# Patient Record
Sex: FEMALE | HISPANIC OR LATINO | Employment: FULL TIME | URBAN - METROPOLITAN AREA
[De-identification: names, ages, dates, MRNs, and addresses within clinical notes are randomized per-mention and may not be internally consistent; named-entity substitution may affect disease eponyms.]

---

## 2015-09-17 LAB
HPV ABSTRACT: NORMAL
PAP-ABSTRACT: NORMAL

## 2017-09-13 ENCOUNTER — PRENATAL OFFICE VISIT (OUTPATIENT)
Dept: MIDWIFE SERVICES | Facility: CLINIC | Age: 37
End: 2017-09-13
Payer: COMMERCIAL

## 2017-09-13 ENCOUNTER — RADIANT APPOINTMENT (OUTPATIENT)
Dept: ULTRASOUND IMAGING | Facility: CLINIC | Age: 37
End: 2017-09-13
Attending: ADVANCED PRACTICE MIDWIFE
Payer: COMMERCIAL

## 2017-09-13 ENCOUNTER — PRENATAL OFFICE VISIT (OUTPATIENT)
Dept: NURSING | Facility: CLINIC | Age: 37
End: 2017-09-13
Payer: COMMERCIAL

## 2017-09-13 VITALS
HEART RATE: 74 BPM | TEMPERATURE: 97.9 F | SYSTOLIC BLOOD PRESSURE: 109 MMHG | BODY MASS INDEX: 28.35 KG/M2 | WEIGHT: 131.4 LBS | HEIGHT: 57 IN | DIASTOLIC BLOOD PRESSURE: 77 MMHG

## 2017-09-13 DIAGNOSIS — Z34.90 SUPERVISION OF NORMAL PREGNANCY: ICD-10-CM

## 2017-09-13 DIAGNOSIS — Z34.90 SUPERVISION OF NORMAL PREGNANCY: Primary | ICD-10-CM

## 2017-09-13 DIAGNOSIS — O02.1 MISSED ABORTION: Primary | ICD-10-CM

## 2017-09-13 PROBLEM — Z23 NEED FOR TDAP VACCINATION: Status: ACTIVE | Noted: 2017-09-13

## 2017-09-13 LAB
ABO + RH BLD: NORMAL
ABO + RH BLD: NORMAL
ALBUMIN UR-MCNC: NEGATIVE MG/DL
APPEARANCE UR: CLEAR
BETA HCG QUAL IFA URINE: POSITIVE
BILIRUB UR QL STRIP: NEGATIVE
BLD GP AB SCN SERPL QL: NORMAL
BLOOD BANK CMNT PATIENT-IMP: NORMAL
COLOR UR AUTO: YELLOW
ERYTHROCYTE [DISTWIDTH] IN BLOOD BY AUTOMATED COUNT: 13.3 % (ref 10–15)
GLUCOSE UR STRIP-MCNC: NEGATIVE MG/DL
HCT VFR BLD AUTO: 37.7 % (ref 35–47)
HGB BLD-MCNC: 12.9 G/DL (ref 11.7–15.7)
HGB UR QL STRIP: NEGATIVE
KETONES UR STRIP-MCNC: NEGATIVE MG/DL
LEUKOCYTE ESTERASE UR QL STRIP: NEGATIVE
MCH RBC QN AUTO: 30.6 PG (ref 26.5–33)
MCHC RBC AUTO-ENTMCNC: 34.2 G/DL (ref 31.5–36.5)
MCV RBC AUTO: 89 FL (ref 78–100)
NITRATE UR QL: NEGATIVE
PH UR STRIP: 7 PH (ref 5–7)
PLATELET # BLD AUTO: 222 10E9/L (ref 150–450)
RBC # BLD AUTO: 4.22 10E12/L (ref 3.8–5.2)
SOURCE: NORMAL
SP GR UR STRIP: 1.01 (ref 1–1.03)
SPECIMEN EXP DATE BLD: NORMAL
UROBILINOGEN UR STRIP-ACNC: 0.2 EU/DL (ref 0.2–1)
WBC # BLD AUTO: 4.1 10E9/L (ref 4–11)

## 2017-09-13 PROCEDURE — 36415 COLL VENOUS BLD VENIPUNCTURE: CPT | Performed by: ADVANCED PRACTICE MIDWIFE

## 2017-09-13 PROCEDURE — 99207 ZZC NO CHARGE NURSE ONLY: CPT

## 2017-09-13 PROCEDURE — 76817 TRANSVAGINAL US OBSTETRIC: CPT | Performed by: OBSTETRICS & GYNECOLOGY

## 2017-09-13 PROCEDURE — 86901 BLOOD TYPING SEROLOGIC RH(D): CPT | Performed by: ADVANCED PRACTICE MIDWIFE

## 2017-09-13 PROCEDURE — 85027 COMPLETE CBC AUTOMATED: CPT | Performed by: ADVANCED PRACTICE MIDWIFE

## 2017-09-13 PROCEDURE — 86900 BLOOD TYPING SEROLOGIC ABO: CPT | Performed by: ADVANCED PRACTICE MIDWIFE

## 2017-09-13 PROCEDURE — 86762 RUBELLA ANTIBODY: CPT | Performed by: ADVANCED PRACTICE MIDWIFE

## 2017-09-13 PROCEDURE — 84703 CHORIONIC GONADOTROPIN ASSAY: CPT | Performed by: ADVANCED PRACTICE MIDWIFE

## 2017-09-13 PROCEDURE — 83021 HEMOGLOBIN CHROMOTOGRAPHY: CPT | Mod: 90 | Performed by: ADVANCED PRACTICE MIDWIFE

## 2017-09-13 PROCEDURE — 99204 OFFICE O/P NEW MOD 45 MIN: CPT | Performed by: ADVANCED PRACTICE MIDWIFE

## 2017-09-13 PROCEDURE — 76815 OB US LIMITED FETUS(S): CPT | Performed by: OBSTETRICS & GYNECOLOGY

## 2017-09-13 PROCEDURE — 87340 HEPATITIS B SURFACE AG IA: CPT | Performed by: ADVANCED PRACTICE MIDWIFE

## 2017-09-13 PROCEDURE — 87086 URINE CULTURE/COLONY COUNT: CPT | Performed by: ADVANCED PRACTICE MIDWIFE

## 2017-09-13 PROCEDURE — 87389 HIV-1 AG W/HIV-1&-2 AB AG IA: CPT | Performed by: ADVANCED PRACTICE MIDWIFE

## 2017-09-13 PROCEDURE — 86780 TREPONEMA PALLIDUM: CPT | Performed by: ADVANCED PRACTICE MIDWIFE

## 2017-09-13 PROCEDURE — 81003 URINALYSIS AUTO W/O SCOPE: CPT | Performed by: ADVANCED PRACTICE MIDWIFE

## 2017-09-13 PROCEDURE — 86850 RBC ANTIBODY SCREEN: CPT | Performed by: ADVANCED PRACTICE MIDWIFE

## 2017-09-13 PROCEDURE — 99000 SPECIMEN HANDLING OFFICE-LAB: CPT | Performed by: ADVANCED PRACTICE MIDWIFE

## 2017-09-13 NOTE — PROGRESS NOTES
"Sailaja had a dating viability US after her nurse intake.  Per US report, \"Early ge IUP with no cardiac activity seen, consistant with a missed AB.  Measures 7w 3d by today's ultrasound.\"  Discussed options.   expectant management, oral medication or D&C.  She was very upset and tearful since she thought that she could be up to 18 weeks pregnant.  She was insistent that she should be prescribed oral medication as soon as possible.  No OB/Gyn MD was available, so I assisted her to make an appt with Dr. Ramon for tomorrow afternoon.        45 minutes was spent face to face with the patient today discussing her history, diagnosis, and follow-up plan as noted above. Over 50% of the visit was spent in counseling and coordination of care.    Total Visit Time: 45 minutes.       "

## 2017-09-13 NOTE — MR AVS SNAPSHOT
After Visit Summary   9/13/2017    Sailaja Aguirre    MRN: [id number]           Patient Information     Date Of Birth          1980        Visit Information        Provider Department      9/13/2017 9:15 AM RD OB NURSE EDUCATION [facility]        Today's Diagnoses     Supervision of normal pregnancy    -  1       Follow-ups after your visit        Your next 10 appointments already scheduled     Sep 19, 2017  3:00 PM CDT   US OB < 14 WEEKS SINGLE with RDUS1   [facility] ([facility])    [address] [place] [id number] [phone]           Please bring a list of your medicines (including vitamins, minerals and over-the-counter drugs). Also, tell your doctor about any allergies you may have. Wear comfortable clothes and leave your valuables at home.  If you re less than 20 weeks drink four 8-ounce glasses of fluid an hour before your exam. If you need to empty your bladder before your exam, try to release only a little urine. Then, drink another glass of fluid.  You may have up to two family members in the exam room. If you bring a small child, an adult must be there to care for him or her.  Please call the Imaging Department at your exam site with any questions.            Sep 19, 2017  3:45 PM CDT   ESTABLISHED PRENATAL with FRANK Birch CNM   [facility] ([facility])    [address] [place] [id number] [phone]              Future tests that were ordered for you today     Open Future Orders        Priority Expected Expires Ordered    US OB < 14 Weeks Single Routine  9/13/2018 9/13/2017            Who to contact     If you have questions or need follow up information about today's clinic visit or your schedule please contact [facility] directly at [phone].  Normal or non-critical lab and imaging results will be communicated to you by  "MyChart, letter or phone within 4 business days after the clinic has received the results. If you do not hear from us within 7 days, please contact the clinic through [email or web address] or phone. If you have a critical or abnormal lab result, we will notify you by phone as soon as possible.  Submit refill requests through Strutta or call your pharmacy and they will forward the refill request to us. Please allow 3 business days for your refill to be completed.          Additional Information About Your Visit        [email or web address] Analysis Information     Strutta lets you send messages to your doctor, view your test results, renew your prescriptions, schedule appointments and more. To sign up, go to [email or web address] . Click on \"Log in\" on the left side of the screen, which will take you to the Welcome page. Then click on \"Sign up Now\" on the right side of the page.     You will be asked to enter the access code listed below, as well as some personal information. Please follow the directions to create your username and password.     Your access code is: [email or web address]  Expires: 2017 10:03 AM     Your access code will  in 90 days. If you need help or a new code, please call your [place] clinic or [phone].        Care EveryWhere ID     This is your Care EveryWhere ID. This could be used by other organizations to access your [place] medical records  [id number]        Your Vitals Were     Pulse Temperature Height Last Period BMI (Body Mass Index)       74 97.9  F (36.6  C) 4' 8.5\" (1.435 m) 2017 28.94 kg/m2        Blood Pressure from Last 3 Encounters:   17 109/77    Weight from Last 3 Encounters:   17 131 lb 6.4 oz (59.6 kg)              We Performed the Following     ABO/RH Type and Screen     Anti Treponema     Beta HCG qual IFA urine     CBC with Platelets     Hepatitis B surface antigen     HGB Eval Reflex to ELP or RBC Solubility     HIV Antigen Antibody Combo     Rubella Antibody IgG Quantitative  "    UA without Microscopic     Urine Culture Aerobic Bacterial        Primary Care Provider    None Specified       No primary provider on file.        Equal Access to Services     BECKI LU : Hadii colten Pruitt, lucita rangel, zack berrymasumit wagoner, kerline carpenterpinafernando correia. So [facility] [phone].    ATENCIÓN: Si habla español, tiene a walton disposición servicios gratuitos de asistencia lingüística. Llame al [phone].    We comply with applicable federal civil rights laws and [place] laws. We do not discriminate on the basis of race, color, national origin, age, disability sex, sexual orientation or gender identity.            Thank you!     Thank you for choosing [facility]  for your care. Our goal is always to provide you with excellent care. Hearing back from our patients is one way we can continue to improve our services. Please take a few minutes to complete the written survey that you may receive in the mail after your visit with us. Thank you!             Your Updated Medication List - Protect others around you: Learn how to safely use, store and throw away your medicines at www.disposemymeds.org.      Notice  As of 9/13/2017 10:03 AM    You have not been prescribed any medications.

## 2017-09-13 NOTE — MR AVS SNAPSHOT
"              After Visit Summary   2017    Sailaja Aguirre    MRN: [id number]           Patient Information     Date Of Birth          1980        Visit Information        Provider Department      2017 10:00 AM Prachi Sanders CNM [facility]        Today's Diagnoses     Missed     -  1       Follow-ups after your visit        Your next 10 appointments already scheduled     Sep 14, 2017  1:15 PM CDT   SHORT with Christina Ramon MD   [facility] [place]    [address] [place] [id number] [phone]              Who to contact     If you have questions or need follow up information about today's clinic visit or your schedule please contact [facility] directly at [phone].  Normal or non-critical lab and imaging results will be communicated to you by MyChart, letter or phone within 4 business days after the clinic has received the results. If you do not hear from us within 7 days, please contact the clinic through MyChart or phone. If you have a critical or abnormal lab result, we will notify you by phone as soon as possible.  Submit refill requests through Talentwire or call your pharmacy and they will forward the refill request to us. Please allow 3 business days for your refill to be completed.          Additional Information About Your Visit        MyChart Information     Talentwire lets you send messages to your doctor, view your test results, renew your prescriptions, schedule appointments and more. To sign up, go to [email or web address] . Click on \"Log in\" on the left side of the screen, which will take you to the Welcome page. Then click on \"Sign up Now\" on the right side of the page.     You will be asked to enter the access code listed below, as well as some personal information. Please follow the directions to create your username and password.     Your access code is: " [email or web address]  Expires: 2017 10:03 AM     Your access code will  in 90 days. If you need help or a new code, please call your [facility] or [phone].        Care EveryWhere ID     This is your Care EveryWhere ID. This could be used by other organizations to access your [place] medical records  [id number]        Your Vitals Were     Last Period                   2017            Blood Pressure from Last 3 Encounters:   17 109/77    Weight from Last 3 Encounters:   17 131 lb 6.4 oz (59.6 kg)              Today, you had the following     No orders found for display       Primary Care Provider    None Specified       No primary provider on file.        Equal Access to Services     BECKI LU : David Pruitt, lucita rangel, zack wagoner, kerline mcfarlane . So [facility] [phone].    ATENCIÓN: Si habla español, tiene a walton disposición servicios gratuitos de asistencia lingüística. Llame al [phone].    We comply with applicable federal civil rights laws and [place] laws. We do not discriminate on the basis of race, color, national origin, age, disability sex, sexual orientation or gender identity.            Thank you!     Thank you for choosing [facility]  for your care. Our goal is always to provide you with excellent care. Hearing back from our patients is one way we can continue to improve our services. Please take a few minutes to complete the written survey that you may receive in the mail after your visit with us. Thank you!             Your Updated Medication List - Protect others around you: Learn how to safely use, store and throw away your medicines at www.disposemymeds.org.      Notice  As of 2017  1:27 PM    You have not been prescribed any medications.

## 2017-09-13 NOTE — LETTER
[facility]  [address] [place] [address] [address]  [facility] [id number] [phone]      September 13, 2017      Sailaja Aguirre  [address]  [facility] [address]              To Whom It May Concern:    Sailaja Aguirre is being seen in our clinic for prenatal care.  Her Estimated Date of Delivery: Feb 10, 2018.  Patient's last menstrual period was 05/06/2017..      Sincerely,              Prachi Sanders CNM

## 2017-09-13 NOTE — PROGRESS NOTES
Patient presents for new ob teaching and labs, third pregnancy.  She has irregular periods, every 28 to 40 days, LMP 5/06/17.  Ultrasound scheduled for today Patient took home pregnancy test 3 weeks ago and was positive. Rechecked pregnancy test today with positive result. Handouts reviewed and given. Has NOB appointment today with CNM. Patient needs Rx for prenatal vitamins    Caffeine intake/servings daily - 2  Calcium intake/servings daily - 3  Exercise 0 times weekly - describe ; encouraged walking  Sunscreen used - Yes  Seatbelts used - Yes  Guns stored in the home - No  Self Breast Exam - No  Pap test up to date -  No  Eye exam up to date -  No  Dental exam up to date -  No  Immunizations reviewed and up to date - Yes  Abuse: Current or Past (Physical, Sexual or Emotional) - No  Do you feel safe in your environment - Yes  Do you cope well with stress - Yes  Do you suffer from insomnia - No      Prenatal OB Questionnaire  Past Medical History  Diabetes   No  Hypertension   No  Heart Disease, mitral valve prolapse, or rheumatic fever?   No  An autoimmune disorder such as Lupus or Rheumatoid Arthritis?   No  Kidney Disease or Urinary Tract Infection?   No  Epilepsy, seizures or spells?   No  Migraine headaches?   No  A stroke or loss of function or sensation?   No  Any other neurological problems?   No  Have you ever been treated for depression?  No  Are you having problems with crying spells or loss of self-esteem?   No  Have you ever required psychiatric care?   No  Have you ever hepatitis, liver disease or jaundice?   No  Have you ever been treated for blood clots in your veins, deep venous thrombosis, inflammation in the veins, thrombosis, phlebitis, pulmonary embolism or varicosities?   No  Have you had excessive bleeding after surgery or dental work?   No  Do you bleed more than other women after a cut or scratch?   No  Do you have a history of anemia?   No  Have you ever been treated for thyroid problems  or taken thyroid medication?  No  Do you have any other endocrine problems?  No  Have you ever been in a major accident or suffered serious trauma?   No  Within the last year, has anyone hit slapped, kicked or otherwise hurt you?  No  In the last year, has anyone forced you to have sex when you didn't want to?  No  Have you ever had a blood transfusion?   No  Would you refuse a blood transfusion if a doctor judged it to be medically necessary?   No  If you answered yes, would you rather die than have a blood transfusion?   No  If you answered yes, is this for Mormon reasons?   No  Does anyone in your home smoke?   No  Do you use tobacco products?  No  Do you drink beer, wine, hard liquor?  No  Do you use any of the following: marijuana, speed, cocaine, heroine, hallucinogens, or other drugs?  No  Is your blood type Rh negative?   No  Have you ever had abnormal antibodies in your blood?   No  Have you ever had asthma?   Yes  Have you ever had tuberculosis?   No  Do you have any allergies to drugs or over-the-counter medications?   No    Allergies as of 9/13/2017:    Allergies as of 09/13/2017     (No Known Allergies)       Do you have any breast problems?   No  Have you ever ?   Yes  Have you had any gynecological surgical procedures such as cervical conization, a LEEP procedure, laser treatment, cryosurgery of the cervix, or a dilation and curettage, etc?  No  Have you had any other surgical procedures?  No  Have you been hospitalized for a nonsurgical reason excluding normal delivery?   No  Have you ever had any anesthetic complications?   No  Have you ever had an abnormal pap smear?   No  Do you have a history of abnormalities of the uterus?   No  Did it take you more than one year to become pregnant?   No  Have you ever been evaluated or treated for infertility?   No  Is there a history of medical problems in your family, which you feel might adversely affect your health or pregnancy?   No  Do you  have any other problems we have not asked you about which you feel may be important to this pregnancy?  No    Symptoms since Last Menstrual Period  Do you have any of the following:    *abdominal pain  No  *blood in stool or urine  No  *chest pain  No  *shortness of breath  No  *coughing or vomiting up blood No  *heart racing or skipping beats  No  *nausea and vomiting  No  *pain with urination  No  *vaginal discharge or bleeding  No  Current medications are:  No current outpatient prescriptions on file.       Genetic Screening  At the time of birth, will you be 35 years old or older?  No  Has the patient, baby s father, or anyone in either family had:  Thalassemia (Italian, Greek, Mediterranean, or  background only) and an MCV result less than 80?  No  Neural tube defect such as meningomyelocele, spina bifida or anencephaly?  No  Congenital heart defect?  Boyfriends niece, she   Down s syndrome?  No  Osmel-Sach s disease (Latter-day, Cajun, South Sudanese-Lumpkin)?  No  Sickle cell disease or trait (Kendra)?  No  Hemophilia or other inherited problems of blood coagulation? No  Muscular dystrophy?  No  Cystic Fibrosis?  No  Powell s chorea?  No  Mental retardation/autism? No   If yes, was the person tested for fragile X?  No  Any other inherited genetic or chromosomal disorder?  No  Maternal metabolic disorder (e.g. insulin-dependent diabetes, PKU)? No  A child with birth defects not listed above?  No  Recurrent pregnancy loss or a stillbirth?  No  Has the patient had any medications/street drugs/alcohol since her last menstrual period? No  Does the patient or baby s father have any other genetic risks?  No  Infection History  Do you object to being tested for Hepatitis B? No  Do you object to being tested for HIV? No  Do you feel that you are at high risk for coming in contact with the AIDS virus?  No  Have you ever been treated for tuberculosis?  No  Have you ever received the BCG vaccine for tuberculosis?   No  Have you ever had a positive skin test for tuberculosis? No  Do you live with someone who has tuberculosis?  No  Have you ever been exposed to tuberculosis?  No  Do you have genital herpes?  No  Does your partner have genital herpes?  No  Have you had a rash or viral illness since your last period?  No  Have you ever had Gonorrhea, Chlamydia, Syphilis, venereal warts, trichomoniasis, pelvic inflammatory disease or any other sexually transmitted disease?  No  Do you know if you are a genital group B streptococcus carrier? No  You have not had chicken pox/varicella  Yes  Have you been vaccinated against chicken pox?  No  Have you had any other infectious disease? No        Early ultrasound screening tool:    Does patient have irregular periods?  Yes  Did patient use hormonal birth control in the three months prior to positive urine pregnancy test? No  Is the patient breastfeeding?  No  Is the patient 10 weeks or greater at time of education visit?  Ultrasound scheduled to determine dates

## 2017-09-14 ENCOUNTER — ANESTHESIA EVENT (OUTPATIENT)
Dept: SURGERY | Facility: CLINIC | Age: 37
End: 2017-09-14
Payer: COMMERCIAL

## 2017-09-14 ENCOUNTER — OFFICE VISIT (OUTPATIENT)
Dept: OBGYN | Facility: CLINIC | Age: 37
End: 2017-09-14
Payer: COMMERCIAL

## 2017-09-14 VITALS
WEIGHT: 132 LBS | TEMPERATURE: 97.8 F | SYSTOLIC BLOOD PRESSURE: 114 MMHG | DIASTOLIC BLOOD PRESSURE: 71 MMHG | HEART RATE: 81 BPM | BODY MASS INDEX: 29.07 KG/M2

## 2017-09-14 DIAGNOSIS — O02.1 MISSED ABORTION: Primary | ICD-10-CM

## 2017-09-14 LAB
BACTERIA SPEC CULT: NO GROWTH
HBV SURFACE AG SERPL QL IA: NONREACTIVE
HGB A1 MFR BLD: 96.5 % (ref 95–97.9)
HGB A2 MFR BLD: 3.1 % (ref 2–3.5)
HGB C MFR BLD: 0 % (ref 0–0)
HGB E MFR BLD: 0 % (ref 0–0)
HGB F MFR BLD: 0.4 % (ref 0–2.1)
HGB FRACT BLD ELPH-IMP: NORMAL
HGB OTHER MFR BLD: 0 % (ref 0–0)
HGB S BLD QL SOLY: NORMAL
HGB S MFR BLD: 0 % (ref 0–0)
HIV 1+2 AB+HIV1 P24 AG SERPL QL IA: NONREACTIVE
PATH INTERP BLD-IMP: NORMAL
RUBV IGG SERPL IA-ACNC: 12 IU/ML
SPECIMEN SOURCE: NORMAL
T PALLIDUM IGG+IGM SER QL: NEGATIVE

## 2017-09-14 PROCEDURE — 99213 OFFICE O/P EST LOW 20 MIN: CPT | Performed by: OBSTETRICS & GYNECOLOGY

## 2017-09-14 RX ORDER — PNV NO.95/FERROUS FUM/FOLIC AC 28MG-0.8MG
1 TABLET ORAL DAILY
Qty: 100 TABLET | Refills: 3 | Status: SHIPPED | OUTPATIENT
Start: 2017-09-14 | End: 2020-08-10

## 2017-09-14 NOTE — MR AVS SNAPSHOT
"              After Visit Summary   2017    Sailaja Aguirre    MRN: [id number]           Patient Information     Date Of Birth          1980        Visit Information        Provider Department      2017 1:15 PM Christina Ramon [place] [facility]        Today's Diagnoses     Missed     -  1       Follow-ups after your visit        Your next 10 appointments already scheduled     Sep 15, 2017   Procedure with Gris Huerta MD   [facility] [place] Same Day [facility] (--)    [address]  [place] [address] [phone]              Who to contact     If you have questions or need follow up information about today's clinic visit or your schedule please contact [facility] directly at [phone].  Normal or non-critical lab and imaging results will be communicated to you by MyChart, letter or phone within 4 business days after the clinic has received the results. If you do not hear from us within 7 days, please contact the clinic through MyChart or phone. If you have a critical or abnormal lab result, we will notify you by phone as soon as possible.  Submit refill requests through inMarket or call your pharmacy and they will forward the refill request to us. Please allow 3 business days for your refill to be completed.          Additional Information About Your Visit        MyChart Information     inMarket lets you send messages to your doctor, view your test results, renew your prescriptions, schedule appointments and more. To sign up, go to [email or web address] . Click on \"Log in\" on the left side of the screen, which will take you to the Welcome page. Then click on \"Sign up Now\" on the right side of the page.     You will be asked to enter the access code listed below, as well as some personal information. Please follow the directions to create your username and password.     Your access code is: [email or web address]  Expires: 2017 10:03 AM     Your access " code will  in 90 days. If you need help or a new code, please call your [place] clinic or [phone].        Care EveryWhere ID     This is your Care EveryWhere ID. This could be used by other organizations to access your [place] medical records  [id number]        Your Vitals Were     Pulse Temperature Last Period BMI (Body Mass Index)          81 97.8  F (36.6  C) (Oral) 2017 29.07 kg/m2         Blood Pressure from Last 3 Encounters:   17 114/71   17 109/77    Weight from Last 3 Encounters:   17 132 lb (59.9 kg)   17 131 lb 6.4 oz (59.6 kg)              Today, you had the following     No orders found for display         Today's Medication Changes          These changes are accurate as of: 17  6:15 PM.  If you have any questions, ask your nurse or doctor.               Start taking these medicines.        Dose/Directions    Prenatal Vitamins 28-0.8 MG Tabs   Used for:  Missed    Started by:  Christina Ramon MD        Dose:  1 Dose   Take 1 Dose by mouth daily   Quantity:  100 tablet   Refills:  3            Where to get your medicines      These medications were sent to [facility] [address]  [address] [address]     Phone:  [phone]     Prenatal Vitamins 28-0.8 MG Tabs                Primary Care Provider    None Specified       No primary provider on file.        Equal Access to Services     BECKI LU AH: Hadana laura barrow Soshelia, waaxda luqadaha, qaybta kaalmada ciaran, kerline correia. So [facility] [phone].    ATENCIÓN: Si habla español, tiene a walton disposición servicios gratuitos de asistencia lingüística. Llame al [phone].    We comply with applicable federal civil rights laws and [place] laws. We do not discriminate on the basis of race, color, national origin, age, disability sex, sexual orientation or gender identity.            Thank  you!     Thank you for choosing [facility]  for your care. Our goal is always to provide you with excellent care. Hearing back from our patients is one way we can continue to improve our services. Please take a few minutes to complete the written survey that you may receive in the mail after your visit with us. Thank you!             Your Updated Medication List - Protect others around you: Learn how to safely use, store and throw away your medicines at www.disposemymeds.org.          This list is accurate as of: 17  6:15 PM.  Always use your most recent med list.                   Brand Name Dispense Instructions for use Diagnosis    Prenatal Vitamins 28-0.8 MG Tabs     100 tablet    Take 1 Dose by mouth daily    Missed

## 2017-09-14 NOTE — Clinical Note
Kirt Anderson, this patient is set up for a suction D & C with you tomorrow.  She specifically requested for the staff to do the case as she is very nervous about a perforation.  Christina

## 2017-09-14 NOTE — LETTER
Sailaja MENCHACA Timothy was scheduled for surgery 9/14/2017     Surgeon: on call doc  Procedure: D & C with suction curettage  Diagnosis: missed AB  Day & Time Preference:tomorrow  Assistance Requested:No  Length of surgery:1-2 hours  Surgery Instructions:Day surgery     No Known Allergies      I did preop today.  PAXTON

## 2017-09-14 NOTE — PROGRESS NOTES
Sailaja Aguirre is a 37 year old female   who presents today for US follow up.   pelvic ultrasound was done yesterday which showed fetal demise at 7w3d.   Periods were very irregular and she had a one day period ~ 17.    Was not sure how far along she was, so US was done for dating.   She met with JESSICA yesterday and was informed of the US results.  She is here today to discuss management of fetal demise.     She is not bleeding, spotting or cramping.     Results for orders placed or performed in visit on 17   US OB < 14 Weeks Single    Narrative    Obstetrical Ultrasound Report  OB 1st trimester U/S -  Transabdominal and Transvaginal  [facility]  Referring Provider: Hui Sanders CNM  Sonographer: Makeda Smith RDMS RVT  Indication:  Viability check  History: bleeding in early pregnancy    Dating (mm/dd/yyyy):   LMP: unknown  Current Scan On:  17                       EDC:  18            GA by Current   Scan:                  7w3d  The calculation of the gestational age by current scan was based on CRL.    Anatomy Scan:  Ge gestation.  Biometry:  CRL  1.2cm    7w3d                                                                     Yolk Sac  5.1mm                                                                                        Fetal heart activity: no FHT'S  Findings: no FHT's, no blood flow on color Doppler     Maternal Structures:  Cervix: The cervix appears long and closed.  Right Adnexa: normal  Left Adnexa: normal    Impression:   Early ge IUP with no cardiac activity seen, consistant with a   missed AB.  Measures 7w 3d by today's ultrasound.    Gris Huerta            Obstetric History       T2      L2     SAB0   TAB0   Ectopic0   Multiple0   Live Births2       # Outcome Date GA Lbr Ronnie/2nd Weight Sex Delivery Anes PTL Lv   3 Current            2 Term 13 40w0d  7 lb 8 oz (3.402 kg) F  None N SHANTHI   1 Term  06 40w0d  6 lb 6 oz (2.892 kg) F  EPI N SHANTHI           Past Medical History:   Diagnosis Date     NO ACTIVE PROBLEMS        Past Surgical History:   Procedure Laterality Date     HERNIA REPAIR      AGE 8 YEARS OLD       Current Outpatient Prescriptions   Medication Sig Dispense Refill     Prenatal Vit-Fe Fumarate-FA (PRENATAL VITAMINS) 28-0.8 MG TABS Take 1 Dose by mouth daily 100 tablet 3      No Known Allergies      ROS:  ROS:  C: NEGATIVE for fever, chills   I: NEGATIVE for worrisome rashes, moles or lesions  E: NEGATIVE for vision changes or irritation  E/M: NEGATIVE for ear, mouth and throat problems  R: NEGATIVE for significant cough or SOB  CV: NEGATIVE for chest pain, palpitations or peripheral edema  GI: NEGATIVE for nausea, abdominal pain, heartburn, or change in bowel habits  : NEGATIVE for frequency, dysuria, hematuria, vaginal discharge, or irregular bleeding  M: NEGATIVE for significant arthralgias or myalgia  N: NEGATIVE for weakness, dizziness or paresthesias  E: NEGATIVE for temperature intolerance, skin/hair changes  P: NEGATIVE for changes in mood or affect     OBJECTIVE:  /71  Pulse 81  Temp 97.8  F (36.6  C) (Oral)  Wt 132 lb (59.9 kg)  LMP 2017  BMI 29.07 kg/m2     GENERAL: WDWN, NAD  HEENT: no abnormalities  NECK: without thyromegaly or adenopathy  CHEST: clear to auscultation  CV: RRR without murmur  BREASTS: deferred  ABDOMEN: S, NT, no palpable masses or hepatosplenomegaly  MUSCULOSKELETAL: no obvious abnormalities.  NEUROLOGICAL: normal strength, sensation, mental status  PSYCH: appropriately sad  PELVIC:  - deferred.       ASSESSMENT:  Missed AB at 7w3d  Desires suction D & C.     PLAN:  We discussed diagnosis of missed ab and management options:  expectant management, medical management with misoprostil or a suction D & C.   We discussed R/B/SE of each option.  At first she was considering medical management but decided against that b/c she wants it done and  over faster.    We discussed specifically the risk of uterine perforation with operative procedure and she requested that the attending doctor do the procedure.   She is scheduled for tomorrow.  Labs are current.    Blood type rh positive.     Christina Ramon MD

## 2017-09-15 ENCOUNTER — HOSPITAL ENCOUNTER (OUTPATIENT)
Facility: CLINIC | Age: 37
Discharge: HOME | End: 2017-09-15
Attending: OBSTETRICS & GYNECOLOGY | Admitting: OBSTETRICS & GYNECOLOGY
Payer: COMMERCIAL

## 2017-09-15 ENCOUNTER — ANESTHESIA (OUTPATIENT)
Dept: SURGERY | Facility: CLINIC | Age: 37
End: 2017-09-15
Payer: COMMERCIAL

## 2017-09-15 ENCOUNTER — TRANSFERRED RECORDS (OUTPATIENT)
Dept: MULTI SPECIALTY CLINIC | Facility: CLINIC | Age: 37
End: 2017-09-15

## 2017-09-15 ENCOUNTER — SURGERY (OUTPATIENT)
Age: 37
End: 2017-09-15

## 2017-09-15 VITALS
TEMPERATURE: 98.6 F | OXYGEN SATURATION: 98 % | DIASTOLIC BLOOD PRESSURE: 69 MMHG | WEIGHT: 131.17 LBS | RESPIRATION RATE: 12 BRPM | BODY MASS INDEX: 28.3 KG/M2 | HEIGHT: 57 IN | SYSTOLIC BLOOD PRESSURE: 104 MMHG

## 2017-09-15 DIAGNOSIS — Z41.9 ELECTIVE SURGERY: Primary | ICD-10-CM

## 2017-09-15 PROCEDURE — 25000125 ZZHC RX 250: Performed by: OBSTETRICS & GYNECOLOGY

## 2017-09-15 PROCEDURE — 00000159 ZZHCL STATISTIC H-SEND OUTS PREP: Performed by: OBSTETRICS & GYNECOLOGY

## 2017-09-15 PROCEDURE — 36000051 ZZH SURGERY LEVEL 2 1ST 30 MIN - UMMC: Performed by: OBSTETRICS & GYNECOLOGY

## 2017-09-15 PROCEDURE — 27210794 ZZH OR GENERAL SUPPLY STERILE: Performed by: OBSTETRICS & GYNECOLOGY

## 2017-09-15 PROCEDURE — 36000053 ZZH SURGERY LEVEL 2 EA 15 ADDTL MIN - UMMC: Performed by: OBSTETRICS & GYNECOLOGY

## 2017-09-15 PROCEDURE — 37000009 ZZH ANESTHESIA TECHNICAL FEE, EACH ADDTL 15 MIN: Performed by: OBSTETRICS & GYNECOLOGY

## 2017-09-15 PROCEDURE — 25000128 H RX IP 250 OP 636: Performed by: NURSE ANESTHETIST, CERTIFIED REGISTERED

## 2017-09-15 PROCEDURE — 88305 TISSUE EXAM BY PATHOLOGIST: CPT | Mod: 26 | Performed by: OBSTETRICS & GYNECOLOGY

## 2017-09-15 PROCEDURE — 71000027 ZZH RECOVERY PHASE 2 EACH 15 MINS: Performed by: OBSTETRICS & GYNECOLOGY

## 2017-09-15 PROCEDURE — 25000132 ZZH RX MED GY IP 250 OP 250 PS 637: Performed by: OBSTETRICS & GYNECOLOGY

## 2017-09-15 PROCEDURE — 37000008 ZZH ANESTHESIA TECHNICAL FEE, 1ST 30 MIN: Performed by: OBSTETRICS & GYNECOLOGY

## 2017-09-15 PROCEDURE — 88305 TISSUE EXAM BY PATHOLOGIST: CPT | Performed by: OBSTETRICS & GYNECOLOGY

## 2017-09-15 PROCEDURE — 25000125 ZZHC RX 250: Performed by: NURSE ANESTHETIST, CERTIFIED REGISTERED

## 2017-09-15 PROCEDURE — 59820 CARE OF MISCARRIAGE: CPT | Mod: GC | Performed by: OBSTETRICS & GYNECOLOGY

## 2017-09-15 PROCEDURE — 40000170 ZZH STATISTIC PRE-PROCEDURE ASSESSMENT II: Performed by: OBSTETRICS & GYNECOLOGY

## 2017-09-15 RX ORDER — ONDANSETRON 2 MG/ML
4 INJECTION INTRAMUSCULAR; INTRAVENOUS EVERY 30 MIN PRN
Status: DISCONTINUED | OUTPATIENT
Start: 2017-09-15 | End: 2017-09-15 | Stop reason: HOSPADM

## 2017-09-15 RX ORDER — DOXYCYCLINE 100 MG/10ML
100 INJECTION, POWDER, LYOPHILIZED, FOR SOLUTION INTRAVENOUS
Status: COMPLETED | OUTPATIENT
Start: 2017-09-15 | End: 2017-09-15

## 2017-09-15 RX ORDER — OXYCODONE HYDROCHLORIDE 5 MG/1
5-10 TABLET ORAL
Status: COMPLETED | OUTPATIENT
Start: 2017-09-15 | End: 2017-09-15

## 2017-09-15 RX ORDER — IBUPROFEN 600 MG/1
600 TABLET, FILM COATED ORAL EVERY 6 HOURS PRN
Qty: 30 TABLET | Refills: 0 | Status: SHIPPED | OUTPATIENT
Start: 2017-09-15 | End: 2019-08-01

## 2017-09-15 RX ORDER — LIDOCAINE HYDROCHLORIDE 20 MG/ML
INJECTION, SOLUTION INFILTRATION; PERINEURAL PRN
Status: DISCONTINUED | OUTPATIENT
Start: 2017-09-15 | End: 2017-09-15

## 2017-09-15 RX ORDER — MEPERIDINE HYDROCHLORIDE 25 MG/ML
12.5 INJECTION INTRAMUSCULAR; INTRAVENOUS; SUBCUTANEOUS
Status: DISCONTINUED | OUTPATIENT
Start: 2017-09-15 | End: 2017-09-15 | Stop reason: HOSPADM

## 2017-09-15 RX ORDER — SENNOSIDES A AND B 8.6 MG/1
2 TABLET, FILM COATED ORAL 2 TIMES DAILY PRN
Qty: 120 TABLET | Refills: 0 | Status: SHIPPED | OUTPATIENT
Start: 2017-09-15 | End: 2020-07-29

## 2017-09-15 RX ORDER — LIDOCAINE 40 MG/G
CREAM TOPICAL
Status: DISCONTINUED | OUTPATIENT
Start: 2017-09-15 | End: 2017-09-15 | Stop reason: HOSPADM

## 2017-09-15 RX ORDER — ONDANSETRON 2 MG/ML
INJECTION INTRAMUSCULAR; INTRAVENOUS PRN
Status: DISCONTINUED | OUTPATIENT
Start: 2017-09-15 | End: 2017-09-15

## 2017-09-15 RX ORDER — NALOXONE HYDROCHLORIDE 0.4 MG/ML
.1-.4 INJECTION, SOLUTION INTRAMUSCULAR; INTRAVENOUS; SUBCUTANEOUS
Status: DISCONTINUED | OUTPATIENT
Start: 2017-09-15 | End: 2017-09-15 | Stop reason: HOSPADM

## 2017-09-15 RX ORDER — FENTANYL CITRATE 50 UG/ML
25-50 INJECTION, SOLUTION INTRAMUSCULAR; INTRAVENOUS
Status: DISCONTINUED | OUTPATIENT
Start: 2017-09-15 | End: 2017-09-15 | Stop reason: HOSPADM

## 2017-09-15 RX ORDER — SODIUM CHLORIDE, SODIUM LACTATE, POTASSIUM CHLORIDE, CALCIUM CHLORIDE 600; 310; 30; 20 MG/100ML; MG/100ML; MG/100ML; MG/100ML
INJECTION, SOLUTION INTRAVENOUS CONTINUOUS
Status: DISCONTINUED | OUTPATIENT
Start: 2017-09-15 | End: 2017-09-15 | Stop reason: HOSPADM

## 2017-09-15 RX ORDER — SODIUM CHLORIDE, SODIUM LACTATE, POTASSIUM CHLORIDE, CALCIUM CHLORIDE 600; 310; 30; 20 MG/100ML; MG/100ML; MG/100ML; MG/100ML
INJECTION, SOLUTION INTRAVENOUS CONTINUOUS PRN
Status: DISCONTINUED | OUTPATIENT
Start: 2017-09-15 | End: 2017-09-15

## 2017-09-15 RX ORDER — PHENAZOPYRIDINE HYDROCHLORIDE 200 MG/1
200 TABLET, FILM COATED ORAL ONCE
Status: COMPLETED | OUTPATIENT
Start: 2017-09-15 | End: 2017-09-15

## 2017-09-15 RX ORDER — ACETAMINOPHEN 325 MG/1
650 TABLET ORAL
Status: DISCONTINUED | OUTPATIENT
Start: 2017-09-15 | End: 2017-09-15 | Stop reason: HOSPADM

## 2017-09-15 RX ORDER — PROPOFOL 10 MG/ML
INJECTION, EMULSION INTRAVENOUS CONTINUOUS PRN
Status: DISCONTINUED | OUTPATIENT
Start: 2017-09-15 | End: 2017-09-15

## 2017-09-15 RX ORDER — ONDANSETRON 4 MG/1
4 TABLET, ORALLY DISINTEGRATING ORAL EVERY 30 MIN PRN
Status: DISCONTINUED | OUTPATIENT
Start: 2017-09-15 | End: 2017-09-15 | Stop reason: HOSPADM

## 2017-09-15 RX ORDER — PROPOFOL 10 MG/ML
INJECTION, EMULSION INTRAVENOUS PRN
Status: DISCONTINUED | OUTPATIENT
Start: 2017-09-15 | End: 2017-09-15

## 2017-09-15 RX ORDER — ONDANSETRON 4 MG/1
4 TABLET, ORALLY DISINTEGRATING ORAL
Status: DISCONTINUED | OUTPATIENT
Start: 2017-09-15 | End: 2017-09-15 | Stop reason: HOSPADM

## 2017-09-15 RX ORDER — LIDOCAINE HYDROCHLORIDE 10 MG/ML
INJECTION, SOLUTION INFILTRATION; PERINEURAL PRN
Status: DISCONTINUED | OUTPATIENT
Start: 2017-09-15 | End: 2017-09-15 | Stop reason: HOSPADM

## 2017-09-15 RX ORDER — KETOROLAC TROMETHAMINE 30 MG/ML
INJECTION, SOLUTION INTRAMUSCULAR; INTRAVENOUS PRN
Status: DISCONTINUED | OUTPATIENT
Start: 2017-09-15 | End: 2017-09-15

## 2017-09-15 RX ORDER — FENTANYL CITRATE 50 UG/ML
INJECTION, SOLUTION INTRAMUSCULAR; INTRAVENOUS PRN
Status: DISCONTINUED | OUTPATIENT
Start: 2017-09-15 | End: 2017-09-15

## 2017-09-15 RX ADMIN — OXYCODONE HYDROCHLORIDE 5 MG: 5 TABLET ORAL at 16:51

## 2017-09-15 RX ADMIN — ONDANSETRON 4 MG: 2 INJECTION INTRAMUSCULAR; INTRAVENOUS at 15:36

## 2017-09-15 RX ADMIN — KETOROLAC TROMETHAMINE 30 MG: 30 INJECTION, SOLUTION INTRAMUSCULAR at 15:42

## 2017-09-15 RX ADMIN — LIDOCAINE HYDROCHLORIDE 20 ML: 10 INJECTION, SOLUTION INFILTRATION; PERINEURAL at 15:33

## 2017-09-15 RX ADMIN — MIDAZOLAM HYDROCHLORIDE 2 MG: 1 INJECTION, SOLUTION INTRAMUSCULAR; INTRAVENOUS at 15:11

## 2017-09-15 RX ADMIN — PHENAZOPYRIDINE HYDROCHLORIDE 200 MG: 200 TABLET, COATED ORAL at 14:15

## 2017-09-15 RX ADMIN — FENTANYL CITRATE 50 MCG: 50 INJECTION, SOLUTION INTRAMUSCULAR; INTRAVENOUS at 15:15

## 2017-09-15 RX ADMIN — MIDAZOLAM HYDROCHLORIDE 1 MG: 1 INJECTION, SOLUTION INTRAMUSCULAR; INTRAVENOUS at 15:15

## 2017-09-15 RX ADMIN — PROPOFOL 30 MG: 10 INJECTION, EMULSION INTRAVENOUS at 15:24

## 2017-09-15 RX ADMIN — SILVER NITRATE APPLICATORS 3 APPLICATOR: 25; 75 STICK TOPICAL at 15:40

## 2017-09-15 RX ADMIN — SODIUM CHLORIDE, POTASSIUM CHLORIDE, SODIUM LACTATE AND CALCIUM CHLORIDE: 600; 310; 30; 20 INJECTION, SOLUTION INTRAVENOUS at 14:30

## 2017-09-15 RX ADMIN — ACETAMINOPHEN 650 MG: 325 TABLET, FILM COATED ORAL at 16:51

## 2017-09-15 RX ADMIN — DOXYCYCLINE HYCLATE 100 MG: 100 INJECTION, POWDER, LYOPHILIZED, FOR SOLUTION INTRAVENOUS at 14:50

## 2017-09-15 RX ADMIN — PROPOFOL 30 MG: 10 INJECTION, EMULSION INTRAVENOUS at 15:35

## 2017-09-15 RX ADMIN — LIDOCAINE HYDROCHLORIDE 40 MG: 20 INJECTION, SOLUTION INFILTRATION; PERINEURAL at 15:16

## 2017-09-15 RX ADMIN — PROPOFOL 100 MCG/KG/MIN: 10 INJECTION, EMULSION INTRAVENOUS at 15:16

## 2017-09-15 NOTE — LETTER
"2017      Elizabeth Aguirre  [address]  [facility] [address]        Dear ,    We are writing to inform you of your test results.    Your test results fall within the expected range(s) or remain unchanged from previous results.  Please continue with current treatment plan.    Resulted Orders   Surgical pathology exam   Result Value Ref Range    Copath Report       Patient Name: ELIZABETH AGUIRRE  MR#: [id number]  Specimen #: [id number]  Collected: 9/15/2017  Received: 2017  Reported: 2017 16:09  Ordering Phy(s): CARLEY BLEDSOE    For improved result formatting, select 'View Enhanced Report Format'  under Linked Documents section.    SPECIMEN(S):  Products of conception    FINAL DIAGNOSIS:       Uterine cavity, products of conception, dilation and curettage:       - fragments of endometrium, decidua, implantation site, chorionic  villi, and fetal membranes    I have personally reviewed all specimens and/or slides, including the  listed special stains, and used them with my medical judgement to  determine or confirm the final diagnosis.    Electronically signed out by:    Wili Martinez M.D., [facility]    CLINICAL HISTORY:  37-year-old  with a missed  at 7-3/7 weeks' gestation by  ultrasound    GROSS:  The specimen is received fresh with proper patient identification,  labeled \"products of conception.\" The specimen is rec eived in a suction  trap and consists of multiple pink-tan and mucinous soft tissues  measuring 5.5 x 4.0 x 0.8 cm in aggregate. The specimen is submitted  entirely in cassettes 1-5. (Dictated by: Carley Napier 2017  11:50 AM)    MICROSCOPIC:  Microscopic examination performed with findings from routinely stained  H&E slides incorporated into Final Diagnosis section.    CPT Codes:  A: 85077-UM9, [facility]    TESTING LAB LOCATION:  [facility], [address]  [address] [place] [address] " [address]  [phone]    COLLECTION SITE:  Client: [facility] [place]  Location: [facility] (B)           If you have any questions or concerns, please call the clinic at the number listed above.       Sincerely,        No name on file.

## 2017-09-15 NOTE — IP AVS SNAPSHOT
MAIN OR    [address]    [place] [address]    Phone:  [phone]                                       After Visit Summary   9/15/2017    Sailaja Aguirre    MRN: [id number]           After Visit Summary Signature Page     I have received my discharge instructions, and my questions have been answered. I have discussed any challenges I see with this plan with the nurse or doctor.    ..........................................................................................................................................  Patient/Patient Representative Signature      ..........................................................................................................................................  Patient Representative Print Name and Relationship to Patient    ..................................................               ................................................  Date                                            Time    ..........................................................................................................................................  Reviewed by Signature/Title    ...................................................              ..............................................  Date                                                            Time

## 2017-09-15 NOTE — BRIEF OP NOTE
[facility] Brief Operative Note     Pre-operative diagnosis: Missed  @ 7w3d   Post-operative diagnosis: Same   Procedure: Dilation & Curettage with Suction   Surgeon: Dr. Huerta   Assistant(s): Keon Suarez MD   Anesthesia: MAC   Estimated blood loss: 5 ml   Total IV fluids:  Total urine output:   Specimens:   Findings:     Complications:   Condition:   Comments:                              700 ml  100 ml  Products of conception  EUA revealed anteverted uterus approximately 7 weeks in size and a cervix that was minimally dilated. Tissue extracted with suction appeared grossly normal  None   Stable   See dictated operative report for full details

## 2017-09-15 NOTE — ANESTHESIA CARE TRANSFER NOTE
Patient: Sailaja Aguirre    Procedure(s):  Suction Dilation and Curettage  - Wound Class: II-Clean Contaminated    Diagnosis: Missed    Diagnosis Additional Information: No value filed.    Anesthesia Type:   MAC     Note:  Airway :Room Air  Patient transferred to:Phase II  Comments: Patient remains sedated, exchanging room air, in no distress; VSS, normothermic. IV is patent. Report to RN..      Vitals: (Last set prior to Anesthesia Care Transfer)    CRNA VITALS  9/15/2017 1518 - 9/15/2017 1554      9/15/2017             NIBP: 102/72    Pulse: 74    SpO2: 98 %    Resp Rate (observed): 16                Electronically Signed By: FRANK Lopez CRNA  September 15, 2017  3:54 PM

## 2017-09-15 NOTE — PROGRESS NOTES
"   09/15/17 1700   Visit Information   Visit Made By Staff    Type of Visit Initial;On-call;Staff consultation/triage;Distress;Surgical   Distress Emotional   Visited Patient;Family   Interventions   Plan of Care Review   With patient/family/proxy   Basic Spiritual Interventions    introduction/orientation to Spiritual Health Services;Assessment of spiritual needs/resources;Prayer   Advanced Assessments/Interventions   Presenting Concerns/Issues Spiritual/Muslim/emotional support;Grief and adjustment issues;Challenged coping   SPIRITUAL HEALTH SERVICES  [facility] ([facility] 3A Pre-op  ON-CALL VISIT     REFERRAL SOURCE: Hospital  request.     Met with Paul and Sailaja in PACU.  Sailaja had just lost her seven week old pregnancy. \"God has his reasons for these things\", she said.  They have a ten year old girl and a four year old girl at home.  They thought for the child that they would name her Cindy or him, Paul.  We shared a prayer for peace of heart and love to surround the baby's spirit, and also surround the family.     PLAN: No further visits planned.       Anna Judd  Staff   Spiritual Health Services  Pgr: [phone]      "

## 2017-09-15 NOTE — OP NOTE
Operative Note   Name: Sailaja Aguirre  MRN:[id number]  : 1980  Date of Surgery: 09/15/2017    Pre-operative Diagnosis: Missed  @ 7w3d   Post-operative Diagnosis: Same  Procedure(s): Suction Dilation & Curettage    Surgeon: Gris Huerta MD   Assistants: Keon Suarez MD, Kenyn Pan MS4    Anesthesia: MAC  EBL: 5 mL   Urine Output: 100 mL clear urine   Fluids: 700 mL crystalloid    Specimens: Products of conception  Complications: None apparent.  Findings:  EUA revealed normal external genitalia, normal cervix and anteverted uterus, no adnexal masses.    Indications: Sailaja Aguirre is a 37 year old year old  woman at 7w3d by 7 week US who was diagnosed with missed  by ultrasound on 17. She was counseled on the risks, benefits, and alternatives of the procedure, and she consented.   Procedure: The patient was taken to the operating room where she underwent MAC anesthesia without difficulty. She was placed in the dorsal lithotomy position. An examination was done and it was noted that her uterus was anteverted. She was prepped and draped in the usual sterile fashion. A sterile speculum was inserted into the vagina.   2 cc of 1% lidocaine was injected into the anterior cervical lip. A single tooth tenaculum was placed on that location. An additional 8-10cc of 1% lidocaine was injected at 4 o'clock and 8 o'clock to create a paracervical block.   The cervix was serially dilated to 8.5mm using Hegard dilators. A 8mm suction curette was advanced gently to the uterine fundus. The suction device was activated and the curette rotated to clear the uterus of products of conception. 3 more passes of the suction curettage were performed. A sharp curettage was performed with a gritty texture noted in the uterine cavity. There was some bleeding noted at the tenaculum sites, so silver nitrate and pressure were used to achieve good hemostasis. The patient tolerated the procedure well and was  taken to the recovery area in stable condition.   Dr. Gris Huerta was present for the entire procedure    Keon Suarez MD  09/15/2017, 4:10 PM

## 2017-09-15 NOTE — ANESTHESIA POSTPROCEDURE EVALUATION
Patient: Sailaja Aguirre    Procedure(s):  Suction Dilation and Curettage  - Wound Class: II-Clean Contaminated    Diagnosis:Missed    Diagnosis Additional Information: No value filed.    Anesthesia Type:  MAC    Note:  Anesthesia Post Evaluation    Patient location during evaluation: Phase 2  Patient participation: Able to fully participate in evaluation  Level of consciousness: awake  Pain management: adequate  Airway patency: patent  Cardiovascular status: acceptable and stable  Respiratory status: acceptable and room air  Hydration status: acceptable  PONV: none     Anesthetic complications: None          Last vitals:  Vitals:    09/15/17 1340 09/15/17 1550 09/15/17 1600   BP: 122/78 102/72 104/62   Resp: 12 12 12   Temp: 37.5  C (99.5  F) 36.6  C (97.9  F)    SpO2: 100% 100% 97%         Electronically Signed By: Escobar Chapa MD  September 15, 2017  4:11 PM

## 2017-09-15 NOTE — DISCHARGE INSTRUCTIONS
Discharge Instructions: Following a Dilation   and Curettage/Dilation and Evacuation    What to expect:    Expect small to moderate amount of vaginal bleeding which should taper off in 4-5 days. It should not be heavier than your regular menstrual flow.    Do not douche, and use a pad rather than tampons.     No intercourse until bleeding has ceased.    Activity:    Rest the day of surgery. You may resume normal activity the next day.    You may bathe or shower.    Avoid heavy lifting (10-15 lbs) for one week.                  Comfort:    The amount of discomfort you can expect is very unpredictable. If you have pain that cannot be controlled with non-aspirin pain relievers or with the prescription you may have received, you should notify your doctor.    Abdominal cramping (like menstrual cramps) or low back ache are common and should not be a cause for concern. You will be drowsy and weak the day of surgery and possibly the following day.    Diet:    You have no restrictions on your diet. Following surgery, drink plenty of fluids and eat a light meal.    Nausea:    The anesthesia medications you received during your surgical procedure may produce some nausea.    If you feel nauseated, stay in bed, keep your head down and try drinking fluids such as Seven-Up, tea or soup.    Notify Physician at once if you experience:    A fever over 100 degrees (a low grade fever under 100 degrees is usual after surgery).    Heavy flow and/or passing large clots. Saturating more than 1 pad per hour for 2 or more hours.     Severe pain or cramps.  Rev. 5/12      Same-Day Surgery   Adult Discharge Orders & Instructions     For 24 hours after surgery:  1. Get plenty of rest.  A responsible adult must stay with you for at least 24 hours after you leave the hospital.   2. Pain medication can slow your reflexes. Do not drive or use heavy equipment.  If you have weakness or tingling, don't drive or use heavy equipment until this feeling  goes away.  3. Mixing alcohol and pain medication can cause dizziness and slow your breathing. It can even be fatal. Do not drink alcohol while taking pain medication.  4. Avoid strenuous or risky activities.  Ask for help when climbing stairs.   5. You may feel lightheaded.  If so, sit for a few minutes before standing.  Have someone help you get up.   6. If you have nausea (feel sick to your stomach), drink only clear liquids such as apple juice, ginger ale, broth or 7-Up.  Rest may also help.  Be sure to drink enough fluids.  Move to a regular diet as you feel able. Take pain medications with a small amount of solid food, such as toast or crackers, to avoid nausea.   7. A slight fever is normal. Call the doctor if your fever is over 100 F (37.7 C) (taken under the tongue) or lasts longer than 24 hours.  8. You may have a dry mouth, muscle aches, trouble sleeping or a sore throat.  These symptoms should go away after 24 hours.  9. Do not make important or legal decisions.   Pain Management:      1. Take pain medication (if prescribed) for pain as directed by your physician.        2. WARNING: If the pain medication you have been prescribed contains Tylenol  (acetaminophen), DO NOT take additional doses of Tylenol (acetaminophen).     Call your doctor for any of the followin.  Signs of infection (fever, growing tenderness at the surgery site, severe pain, a large amount of drainage or bleeding, foul-smelling drainage, redness, swelling).    2.  It has been over 8 to 10 hours since surgery and you are still not able to urinate (pee).    3.  Headache for over 24 hours.      To contact a doctor, call your OB or:      [phone] and ask for the Resident On Call for:          OB (answered 24 hours a day)      Emergency Department:  [facility]: [phone]  [facility]: [phone]               Rev. 10/2014       [facility]  Pre/Post Care  Unit 3A        Sailaja Aguirre  [address]  [facility] [address]      Date: 9/15/2017      TO WHOM IT MAY CONCERN:    Sailaja Aguirre was seen at our hospital for a procedure on 9/15/2017.  Patient may return to school or work 09/18/2017.  The patient has the following activity restrictions: no Gym or vigorous exercise for 7 days, no lifting over 10-15 lbs for one week.     Sincerely,      Kimberly Tineo RN  9/15/2017  5:22 PM       Pre/Post Care Unit

## 2017-09-15 NOTE — IP AVS SNAPSHOT
MRN:[id number]                      After Visit Summary   9/15/2017    Sailaja Aguirre    MRN: [id number]           Thank you!     Thank you for choosing [facility] for your care. Our goal is always to provide you with excellent care. Hearing back from our patients is one way we can continue to improve our services. Please take a few minutes to complete the written survey that you may receive in the mail after you visit with us. Thank you!        Patient Information     Date Of Birth          1980        About your hospital stay     You were admitted on:  September 15, 2017 You last received care in the:  [facility] OR    You were discharged on:  September 15, 2017       Who to Call     For medical emergencies, please call 911.  For non-urgent questions about your medical care, please call your primary care provider or clinic, None  For questions related to your surgery, please call your surgery clinic        Attending Provider     Provider Gris Koo MD OB/Gyn       Primary Care Provider    Physician No Ref-Primary      After Care Instructions     Discharge Instructions       Please call your OBGYN clinic with any questions or concerns regarding your surgery.            Ice to affected area       PRN as tolerated            Shower       Shower on Post-op day  0.   DO NOT take a bath                  Further instructions from your care team       Discharge Instructions: Following a Dilation   and Curettage/Dilation and Evacuation    What to expect:    Expect small to moderate amount of vaginal bleeding which should taper off in 4-5 days. It should not be heavier than your regular menstrual flow.    Do not douche, and use a pad rather than tampons.     No intercourse until bleeding has ceased.    Activity:    Rest the day of surgery. You may resume normal activity the next day.    You may bathe or shower.    Avoid heavy lifting (10-15 lbs) for one  week.                  Comfort:    The amount of discomfort you can expect is very unpredictable. If you have pain that cannot be controlled with non-aspirin pain relievers or with the prescription you may have received, you should notify your doctor.    Abdominal cramping (like menstrual cramps) or low back ache are common and should not be a cause for concern. You will be drowsy and weak the day of surgery and possibly the following day.    Diet:    You have no restrictions on your diet. Following surgery, drink plenty of fluids and eat a light meal.    Nausea:    The anesthesia medications you received during your surgical procedure may produce some nausea.    If you feel nauseated, stay in bed, keep your head down and try drinking fluids such as Seven-Up, tea or soup.    Notify Physician at once if you experience:    A fever over 100 degrees (a low grade fever under 100 degrees is usual after surgery).    Heavy flow and/or passing large clots. Saturating more than 1 pad per hour for 2 or more hours.     Severe pain or cramps.  Rev. 5/12      Same-Day Surgery   Adult Discharge Orders & Instructions     For 24 hours after surgery:  1. Get plenty of rest.  A responsible adult must stay with you for at least 24 hours after you leave the hospital.   2. Pain medication can slow your reflexes. Do not drive or use heavy equipment.  If you have weakness or tingling, don't drive or use heavy equipment until this feeling goes away.  3. Mixing alcohol and pain medication can cause dizziness and slow your breathing. It can even be fatal. Do not drink alcohol while taking pain medication.  4. Avoid strenuous or risky activities.  Ask for help when climbing stairs.   5. You may feel lightheaded.  If so, sit for a few minutes before standing.  Have someone help you get up.   6. If you have nausea (feel sick to your stomach), drink only clear liquids such as apple juice, ginger ale, broth or 7-Up.  Rest may also help.  Be sure  to drink enough fluids.  Move to a regular diet as you feel able. Take pain medications with a small amount of solid food, such as toast or crackers, to avoid nausea.   7. A slight fever is normal. Call the doctor if your fever is over 100 F (37.7 C) (taken under the tongue) or lasts longer than 24 hours.  8. You may have a dry mouth, muscle aches, trouble sleeping or a sore throat.  These symptoms should go away after 24 hours.  9. Do not make important or legal decisions.   Pain Management:      1. Take pain medication (if prescribed) for pain as directed by your physician.        2. WARNING: If the pain medication you have been prescribed contains Tylenol  (acetaminophen), DO NOT take additional doses of Tylenol (acetaminophen).     Call your doctor for any of the followin.  Signs of infection (fever, growing tenderness at the surgery site, severe pain, a large amount of drainage or bleeding, foul-smelling drainage, redness, swelling).    2.  It has been over 8 to 10 hours since surgery and you are still not able to urinate (pee).    3.  Headache for over 24 hours.      To contact a doctor, call your OB or:      [phone] and ask for the Resident On Call for:          OB (answered 24 hours a day)      Emergency Department:  [facility]: [phone]  [facility]: [phone]               Rev. 10/2014       [facility]  Pre/Post Care Unit 3A        Sailaja Aguirre  [address]  [facility] [address]      Date: 9/15/2017      TO WHOM IT MAY CONCERN:    Sailaja Aguirre was seen at our hospital for a procedure on 9/15/2017.  Patient may return to school or work 2017.  The patient has the following activity restrictions: no Gym or vigorous exercise for 7 days, no lifting over 10-15 lbs for one week.     Sincerely,      Kimberly Tineo RN  9/15/2017  5:22 PM       Pre/Post Care Unit        Pending Results     No orders  "found from 2017 to 2017.            Admission Information     Date & Time Provider Department Dept. Phone    9/15/2017 Gris Huerta MD  MAIN [phone]      Your Vitals Were     Blood Pressure Temperature Respirations Height Weight Last Period     98.4  F (36.9  C) (Axillary) 12 1.435 m (4' 8.5\") 59.5 kg (131 lb 2.8 oz) 2017    Pulse Oximetry BMI (Body Mass Index)                98% 28.89 kg/m2          [employer] Information     [employer] lets you send messages to your doctor, view your test results, renew your prescriptions, schedule appointments and more. To sign up, go to [email or web address] . Click on \"Log in\" on the left side of the screen, which will take you to the Welcome page. Then click on \"Sign up Now\" on the right side of the page.     You will be asked to enter the access code listed below, as well as some personal information. Please follow the directions to create your username and password.     Your access code is: [email or web address]  Expires: 2017 10:03 AM     Your access code will  in 90 days. If you need help or a new code, please call your [facility] clinic or [phone].        Care EveryWhere ID     This is your Care EveryWhere ID. This could be used by other organizations to access your [facility] medical records  [id number]        Equal Access to Services     [facility] CenterAIDEE : Hadii colten mir hadasho Solicoali, waaxda luqadaha, qaybta kaalmada adeegyada, kerline mcfarlane . So [facility] [phone].    ATENCIÓN: Si habla español, tiene a walton disposición servicios gratuitos de asistencia lingüística. Llame al [phone].    We comply with applicable federal civil rights laws and [place] laws. We do not discriminate on the basis of race, color, national origin, age, disability sex, sexual orientation or gender identity.               Review of your medicines      START taking        Dose / Directions    ibuprofen 600 MG tablet   Commonly " known as:  ADVIL/MOTRIN   Used for:  Elective surgery        Dose:  600 mg   Take 1 tablet (600 mg) by mouth every 6 hours as needed for pain (mild)   Quantity:  30 tablet   Refills:  0       senna 8.6 MG tablet   Commonly known as:  SENOKOT   Used for:  Elective surgery        Dose:  2 tablet   Take 2 tablets by mouth 2 times daily as needed for constipation   Quantity:  120 tablet   Refills:  0         CONTINUE these medicines which have NOT CHANGED        Dose / Directions    Prenatal Vitamins 28-0.8 MG Tabs   Used for:  Missed         Dose:  1 Dose   Take 1 Dose by mouth daily   Quantity:  100 tablet   Refills:  3            Where to get your medicines      These medications were sent to [facility] [place], [place] - [address]  [address] [address]     Phone:  [phone]     ibuprofen 600 MG tablet    senna 8.6 MG tablet                Protect others around you: Learn how to safely use, store and throw away your medicines at www.disposemymeds.org.             Medication List: This is a list of all your medications and when to take them. Check marks below indicate your daily home schedule. Keep this list as a reference.      Medications           Morning Afternoon Evening Bedtime As Needed    ibuprofen 600 MG tablet   Commonly known as:  ADVIL/MOTRIN   Take 1 tablet (600 mg) by mouth every 6 hours as needed for pain (mild)                                Prenatal Vitamins 28-0.8 MG Tabs   Take 1 Dose by mouth daily                                senna 8.6 MG tablet   Commonly known as:  SENOKOT   Take 2 tablets by mouth 2 times daily as needed for constipation

## 2017-09-19 LAB — COPATH REPORT: NORMAL

## 2017-09-26 ENCOUNTER — TELEPHONE (OUTPATIENT)
Dept: OBGYN | Facility: CLINIC | Age: 37
End: 2017-09-26

## 2017-09-26 DIAGNOSIS — Z30.011 ENCOUNTER FOR INITIAL PRESCRIPTION OF CONTRACEPTIVE PILLS: Primary | ICD-10-CM

## 2017-09-26 RX ORDER — LEVONORGESTREL/ETHIN.ESTRADIOL 0.1-0.02MG
1 TABLET ORAL DAILY
Qty: 84 TABLET | Refills: 4 | Status: SHIPPED | OUTPATIENT
Start: 2017-09-26 | End: 2019-10-15

## 2017-09-26 NOTE — TELEPHONE ENCOUNTER
Sailaja called back and would like the script to be sent to the [facility] that is listed in her file it is the [address]. Any questions she can be reached at [phone]. Wondering if she needed to come in for an appointment to have the script done.

## 2017-09-26 NOTE — TELEPHONE ENCOUNTER
Patient calling to get prescription for birth control pills. Pt was seen on 9/15 for missed SAB and D&C. Pt is going to call back with OCP information on the pill she was taking prior to pregnancy. Verify pharmacy - [facility] [place] [facility]  Vania Miranda, RN-BSN

## 2017-09-26 NOTE — TELEPHONE ENCOUNTER
Routing to Dr Huerta. Are you able to send a prescription for birth control or does patient need to be seen? Pharmacy is teed up.   Vania Miranda, RN-BSN

## 2017-09-26 NOTE — TELEPHONE ENCOUNTER
TC to patient. Left detailed message with information below. Advised to call with questions.   Vania iMranda RN-BSN

## 2017-09-26 NOTE — TELEPHONE ENCOUNTER
Rx for noreen makixed.  She should abstain for 2 weeks following the D&C and then can either start the OCPs at that time, or when she gets her first period.  She should use backup for 2 weeks.  Thanks    CARLEY BLEDSOE MD

## 2019-08-01 ENCOUNTER — OFFICE VISIT (OUTPATIENT)
Dept: FAMILY MEDICINE | Facility: CLINIC | Age: 39
End: 2019-08-01
Payer: COMMERCIAL

## 2019-08-01 ENCOUNTER — TELEPHONE (OUTPATIENT)
Dept: FAMILY MEDICINE | Facility: CLINIC | Age: 39
End: 2019-08-01

## 2019-08-01 VITALS
HEIGHT: 56 IN | TEMPERATURE: 97.6 F | BODY MASS INDEX: 28.57 KG/M2 | WEIGHT: 127 LBS | RESPIRATION RATE: 14 BRPM | SYSTOLIC BLOOD PRESSURE: 113 MMHG | DIASTOLIC BLOOD PRESSURE: 77 MMHG | OXYGEN SATURATION: 100 % | HEART RATE: 67 BPM

## 2019-08-01 DIAGNOSIS — R07.89 ATYPICAL CHEST PAIN: ICD-10-CM

## 2019-08-01 DIAGNOSIS — R51.9 THROBBING HEADACHE: ICD-10-CM

## 2019-08-01 DIAGNOSIS — R68.83 CHILLS: ICD-10-CM

## 2019-08-01 DIAGNOSIS — R11.0 NAUSEA: ICD-10-CM

## 2019-08-01 DIAGNOSIS — M54.42 ACUTE BILATERAL LOW BACK PAIN WITH BILATERAL SCIATICA: Primary | ICD-10-CM

## 2019-08-01 DIAGNOSIS — M54.41 ACUTE BILATERAL LOW BACK PAIN WITH BILATERAL SCIATICA: Primary | ICD-10-CM

## 2019-08-01 LAB
ALBUMIN UR-MCNC: NEGATIVE MG/DL
APPEARANCE UR: CLEAR
BASOPHILS # BLD AUTO: 0 10E9/L (ref 0–0.2)
BASOPHILS NFR BLD AUTO: 0.2 %
BILIRUB UR QL STRIP: NEGATIVE
COLOR UR AUTO: YELLOW
DIFFERENTIAL METHOD BLD: NORMAL
EOSINOPHIL # BLD AUTO: 0.1 10E9/L (ref 0–0.7)
EOSINOPHIL NFR BLD AUTO: 1.5 %
ERYTHROCYTE [DISTWIDTH] IN BLOOD BY AUTOMATED COUNT: 13.1 % (ref 10–15)
ERYTHROCYTE [SEDIMENTATION RATE] IN BLOOD BY WESTERGREN METHOD: 10 MM/H (ref 0–20)
GLUCOSE UR STRIP-MCNC: NEGATIVE MG/DL
HCG UR QL: NEGATIVE
HCT VFR BLD AUTO: 37.9 % (ref 35–47)
HGB BLD-MCNC: 12.8 G/DL (ref 11.7–15.7)
HGB UR QL STRIP: NEGATIVE
KETONES UR STRIP-MCNC: ABNORMAL MG/DL
LEUKOCYTE ESTERASE UR QL STRIP: NEGATIVE
LYMPHOCYTES # BLD AUTO: 1.7 10E9/L (ref 0.8–5.3)
LYMPHOCYTES NFR BLD AUTO: 31.7 %
MCH RBC QN AUTO: 30.6 PG (ref 26.5–33)
MCHC RBC AUTO-ENTMCNC: 33.8 G/DL (ref 31.5–36.5)
MCV RBC AUTO: 91 FL (ref 78–100)
MONOCYTES # BLD AUTO: 0.7 10E9/L (ref 0–1.3)
MONOCYTES NFR BLD AUTO: 12.2 %
NEUTROPHILS # BLD AUTO: 2.9 10E9/L (ref 1.6–8.3)
NEUTROPHILS NFR BLD AUTO: 54.4 %
NITRATE UR QL: NEGATIVE
PH UR STRIP: 6 PH (ref 5–7)
PLATELET # BLD AUTO: 229 10E9/L (ref 150–450)
RBC # BLD AUTO: 4.18 10E12/L (ref 3.8–5.2)
SOURCE: ABNORMAL
SP GR UR STRIP: 1.01 (ref 1–1.03)
UROBILINOGEN UR STRIP-ACNC: 0.2 EU/DL (ref 0.2–1)
WBC # BLD AUTO: 5.4 10E9/L (ref 4–11)

## 2019-08-01 PROCEDURE — 81003 URINALYSIS AUTO W/O SCOPE: CPT | Performed by: FAMILY MEDICINE

## 2019-08-01 PROCEDURE — 99214 OFFICE O/P EST MOD 30 MIN: CPT | Performed by: FAMILY MEDICINE

## 2019-08-01 PROCEDURE — 36415 COLL VENOUS BLD VENIPUNCTURE: CPT | Performed by: FAMILY MEDICINE

## 2019-08-01 PROCEDURE — 85025 COMPLETE CBC W/AUTO DIFF WBC: CPT | Performed by: FAMILY MEDICINE

## 2019-08-01 PROCEDURE — 81025 URINE PREGNANCY TEST: CPT | Performed by: FAMILY MEDICINE

## 2019-08-01 PROCEDURE — 85652 RBC SED RATE AUTOMATED: CPT | Performed by: FAMILY MEDICINE

## 2019-08-01 PROCEDURE — 86618 LYME DISEASE ANTIBODY: CPT | Performed by: FAMILY MEDICINE

## 2019-08-01 PROCEDURE — 80053 COMPREHEN METABOLIC PANEL: CPT | Performed by: FAMILY MEDICINE

## 2019-08-01 RX ORDER — IBUPROFEN 600 MG/1
600 TABLET, FILM COATED ORAL EVERY 6 HOURS PRN
Qty: 30 TABLET | Refills: 0 | Status: SHIPPED | OUTPATIENT
Start: 2019-08-01 | End: 2020-08-10

## 2019-08-01 ASSESSMENT — MIFFLIN-ST. JEOR: SCORE: 1114.07

## 2019-08-01 NOTE — PATIENT INSTRUCTIONS
For now rest and drink fluids and eat a very bland diet: bananas, rice, apple sauce, toast.    For pain you can take ibuprofen and acetaminophen (Tylenol).  You can take them together.  You should not take more than 1,000 mg of acetaminophen at a time and no more than 3,000 mg per day.    If you pain persists see us next week for follow-up.    If your pain worsens or if you develop worrisome symptoms such as weakness or severe pain go to the [facility].

## 2019-08-01 NOTE — TELEPHONE ENCOUNTER
Writer called patient through a  and relayed message below. Patient to [facility] clinic back if any questions.    Thanks! Bethany Chavez RN

## 2019-08-01 NOTE — TELEPHONE ENCOUNTER
Please call patient and let her know that her urine tests (UA and UPT) are normal.      Angeline Ambrocio MD     Results for orders placed or performed in visit on 08/01/19   CBC with platelets differential   Result Value Ref Range    WBC 5.4 4.0 - 11.0 10e9/L    RBC Count 4.18 3.8 - 5.2 10e12/L    Hemoglobin 12.8 11.7 - 15.7 g/dL    Hematocrit 37.9 35.0 - 47.0 %    MCV 91 78 - 100 fl    MCH 30.6 26.5 - 33.0 pg    MCHC 33.8 31.5 - 36.5 g/dL    RDW 13.1 10.0 - 15.0 %    Platelet Count 229 150 - 450 10e9/L    % Neutrophils 54.4 %    % Lymphocytes 31.7 %    % Monocytes 12.2 %    % Eosinophils 1.5 %    % Basophils 0.2 %    Absolute Neutrophil 2.9 1.6 - 8.3 10e9/L    Absolute Lymphocytes 1.7 0.8 - 5.3 10e9/L    Absolute Monocytes 0.7 0.0 - 1.3 10e9/L    Absolute Eosinophils 0.1 0.0 - 0.7 10e9/L    Absolute Basophils 0.0 0.0 - 0.2 10e9/L    Diff Method Automated Method    ESR: Erythrocyte sedimentation rate   Result Value Ref Range    Sed Rate 10 0 - 20 mm/h   HCG Qual, Urine ([id number])   Result Value Ref Range    HCG Qual Urine Negative NEG^Negative   *UA reflex to Microscopic and Culture ([place] and [facility] (except [place] and [place])   Result Value Ref Range    Color Urine Yellow     Appearance Urine Clear     Glucose Urine Negative NEG^Negative mg/dL    Bilirubin Urine Negative NEG^Negative    Ketones Urine Trace (A) NEG^Negative mg/dL    Specific Gravity Urine 1.015 1.003 - 1.035    Blood Urine Negative NEG^Negative    pH Urine 6.0 5.0 - 7.0 pH    Protein Albumin Urine Negative NEG^Negative mg/dL    Urobilinogen Urine 0.2 0.2 - 1.0 EU/dL    Nitrite Urine Negative NEG^Negative    Leukocyte Esterase Urine Negative NEG^Negative    Source Midstream Urine

## 2019-08-01 NOTE — LETTER
August 5, 2019      Sailaja Aguirre  [address]  [facility] [address]        Dear ,    We are writing to inform you of your test results.    Your results are all normal.  Your urinalysis and urine pregnancy test were normal.  (The ketones on your urinalysis are consistent with not eating much.)  Your complete blood counts, comprehensive metabolic panel results (liver function, kidney function, blood sugar, and blood salts) and Lyme test are all negative.  Please contact the clinic if you are not feeling better.     Resulted Orders   CBC with platelets differential   Result Value Ref Range    WBC 5.4 4.0 - 11.0 10e9/L    RBC Count 4.18 3.8 - 5.2 10e12/L    Hemoglobin 12.8 11.7 - 15.7 g/dL    Hematocrit 37.9 35.0 - 47.0 %    MCV 91 78 - 100 fl    MCH 30.6 26.5 - 33.0 pg    MCHC 33.8 31.5 - 36.5 g/dL    RDW 13.1 10.0 - 15.0 %    Platelet Count 229 150 - 450 10e9/L    % Neutrophils 54.4 %    % Lymphocytes 31.7 %    % Monocytes 12.2 %    % Eosinophils 1.5 %    % Basophils 0.2 %    Absolute Neutrophil 2.9 1.6 - 8.3 10e9/L    Absolute Lymphocytes 1.7 0.8 - 5.3 10e9/L    Absolute Monocytes 0.7 0.0 - 1.3 10e9/L    Absolute Eosinophils 0.1 0.0 - 0.7 10e9/L    Absolute Basophils 0.0 0.0 - 0.2 10e9/L    Diff Method Automated Method    ESR: Erythrocyte sedimentation rate   Result Value Ref Range    Sed Rate 10 0 - 20 mm/h      Comment:      CORRECTED ON 08/01 AT 1602: PREVIOUSLY REPORTED AS 9   Lyme Disease Valeria with reflex to WB Serum   Result Value Ref Range    Lyme Disease Antibodies Serum 0.10 0.00 - 0.89      Comment:      Negative, Absence of detectable Borrelia burdorferi antibodies. A negative   result does not exclude the possibility of Borrelia burgdorferi infection. If   early Lyme disease is suspected, a second sample should be collected and   tested 2 to 4 weeks later.     Comprehensive metabolic panel   Result Value Ref Range    Sodium 137 133 - 144 mmol/L    Potassium 3.8 3.4 - 5.3 mmol/L     Chloride 105 94 - 109 mmol/L    Carbon Dioxide 26 20 - 32 mmol/L    Anion Gap 6 3 - 14 mmol/L    Glucose 79 70 - 99 mg/dL      Comment:      Non Fasting    Urea Nitrogen 8 7 - 30 mg/dL    Creatinine 0.55 0.52 - 1.04 mg/dL    GFR Estimate >90 >60 mL/min/[1.73_m2]      Comment:      Non  GFR Calc  Starting 12/18/2018, serum creatinine based estimated GFR (eGFR) will be   calculated using the Chronic Kidney Disease Epidemiology Collaboration   (CKD-EPI) equation.      GFR Estimate If Black >90 >60 mL/min/[1.73_m2]      Comment:       GFR Calc  Starting 12/18/2018, serum creatinine based estimated GFR (eGFR) will be   calculated using the Chronic Kidney Disease Epidemiology Collaboration   (CKD-EPI) equation.      Calcium 8.9 8.5 - 10.1 mg/dL    Bilirubin Total 0.6 0.2 - 1.3 mg/dL    Albumin 4.1 3.4 - 5.0 g/dL    Protein Total 7.3 6.8 - 8.8 g/dL    Alkaline Phosphatase 57 40 - 150 U/L    ALT 34 0 - 50 U/L    AST 19 0 - 45 U/L   HCG Qual, Urine ([id number])   Result Value Ref Range    HCG Qual Urine Negative NEG^Negative      Comment:      This test is for screening purposes.  Results should be interpreted along with   the clinical picture.  Confirmation testing is available if warranted by   ordering EHC189, HCG Quantitative Pregnancy.     *UA reflex to Microscopic and Culture ([place] and [facility] (except [place] and [place])   Result Value Ref Range    Color Urine Yellow     Appearance Urine Clear     Glucose Urine Negative NEG^Negative mg/dL    Bilirubin Urine Negative NEG^Negative    Ketones Urine Trace (A) NEG^Negative mg/dL    Specific Gravity Urine 1.015 1.003 - 1.035    Blood Urine Negative NEG^Negative    pH Urine 6.0 5.0 - 7.0 pH    Protein Albumin Urine Negative NEG^Negative mg/dL    Urobilinogen Urine 0.2 0.2 - 1.0 EU/dL    Nitrite Urine Negative NEG^Negative    Leukocyte Esterase Urine Negative NEG^Negative    Source Midstream Urine        If you have any questions or  concerns, please call the clinic at the number listed above.       Sincerely,        Angeline Ambrocio MD/nr

## 2019-08-01 NOTE — PROGRESS NOTES
Subjective     Sailaja Aguirre is a 39 year old female who presents to clinic today for the following health issues:    HPI   Back Pain       Duration: x 3 days        Specific cause: none    Description:   Location of pain: low back bilateral  Character of pain: stabbing  Pain radiation: when walking back pain radiates up to central chest and up to head, radiates into both legs - posterior thighs down to knees  New numbness or weakness in legs, not attributed to pain:  no     Intensity: Currently 10/10    History:   Pain interferes with job: YES  History of back problems: recurrent self limited episodes of low back pain in the past - after epidural from first child and only when standing for long periods  Any previous MRI or X-rays: None  Sees a specialist for back pain:  No  Therapies tried without relief: acetaminophen (Tylenol) - minimal relief    Alleviating factors:   Improved by: none      Precipitating factors:  Worsened by: Standing, Walking and Coughing (coughing hurts her chest)        Accompanying Signs & Symptoms:  Risk of Fracture:  None  Risk of Cauda Equina:  None  Risk of Infection:  None  Risk of Cancer:  None  Risk of Ankylosing Spondylitis:  Onset at age <35, male, AND morning back stiffness. no       Acute onset of low back pain without injury.  Pain radiates into bilateral thighs without numbness, tingling, weakness.  Pain radiates into central chest and into head.  Gets brief episodes of throbbing pressure in head - 7 pulsations then it stops.  Associated with nausea, loss of appetite, periumbilical abdominal pain.  No vomiting but eating very little - a few crackers today.   No recent changes in bowel or bladder habits.                     BP Readings from Last 3 Encounters:   08/01/19 113/77   09/15/17 104/69   09/14/17 114/71    Wt Readings from Last 3 Encounters:   08/01/19 57.6 kg (127 lb)   09/15/17 59.5 kg (131 lb 2.8 oz)   09/14/17 59.9 kg (132 lb)              Reviewed and updated as  "needed this visit by Provider  Meds  Problems         Review of Systems   CONST: NEGATIVE for fevers, POSITIVE for chills and fatigue, NEGATIVE for unexplained weight loss/gain  EYES: NEGATIVE for change in vision  ENT: NEGATIVE for difficulty hearing/tinnitus, and POSITIVE for soreness in her teeth with headache  CV: POSITIVE for chest pain/discomfort, leg pain with exercise, and NEGATIVE for palpitations  RESP: NEGATIVE for cough/wheeze, and difficulty breathing  GI: POSITIVE for abdominal pain and nausea, NEGATIVE for vomiting, diarrhea, and blood in bowel movement,   : NEGATIVE for nighttime urination, leaking urine, unusual vaginal bleeding, vaginal discharge, and concerns about sexual function  MS: NEGATIVE for muscle/joint pain  SKIN: NEGATIVE for rash or mole change  NEURO: POSITIVE for headache, dizziness/lightheadedness, NEGATIVE for numbness, memory loss  PSYCH: NEGATIVE for anxiety/stress and depression and POSITIVE for problems with sleep   HEME: NEGATIVE for unexplained lumps, and easy bruising/bleeding  ENDO: NEGATIVE for excessive thirst or urination  ALL: NEGATIVE for hay fever/allergies       Objective    /77 (BP Location: Right arm, Patient Position: Sitting, Cuff Size: Adult Regular)   Pulse 67   Temp 97.6  F (36.4  C) (Tympanic)   Resp 14   Ht 1.422 m (4' 8\")   Wt 57.6 kg (127 lb)   SpO2 100%   Breastfeeding? No   BMI 28.47 kg/m    Body mass index is 28.47 kg/m .  Physical Exam   GEN:  no apparent distress  EYES: PERRL, EOMI, no nystagmus, sclerae and conjunctivae clear with no discharge  NECK:  Supple without adenopathy, mass, or thyromegaly  LUNGS:  normal respiratory effort, and lungs clear to auscultation bilaterally - no rales, rhonchi or wheezes  CV: regular rate and rhythm, normal S1 S2, no S3 or S4 and no murmur, click or rub  ABD:  soft, nontender, no mass, no hepatosplenomegaly, no hernias  BACK:  No focal tenderness to palpation over the spinous processes.  There is " focal tenderness to palpation over the paraspinal muscles.  NEURO:  No focal deficits noted, No facial asymmetry, Equal movement of all 4 extremities, Strength grossly intact, CN II-XII intact, Finger-nose-finger intact, Toes down-going bilaterally, LE DTR's are 2+ and symmetric.  Great toe dorsiflexion strength intact.    SKIN: Clear. No significant rash, abnormal pigmentation or lesions       Diagnostic Test Results:  Labs reviewed in Epic  Results for orders placed or performed in visit on 08/01/19   CBC with platelets differential   Result Value Ref Range    WBC 5.4 4.0 - 11.0 10e9/L    RBC Count 4.18 3.8 - 5.2 10e12/L    Hemoglobin 12.8 11.7 - 15.7 g/dL    Hematocrit 37.9 35.0 - 47.0 %    MCV 91 78 - 100 fl    MCH 30.6 26.5 - 33.0 pg    MCHC 33.8 31.5 - 36.5 g/dL    RDW 13.1 10.0 - 15.0 %    Platelet Count 229 150 - 450 10e9/L    % Neutrophils 54.4 %    % Lymphocytes 31.7 %    % Monocytes 12.2 %    % Eosinophils 1.5 %    % Basophils 0.2 %    Absolute Neutrophil 2.9 1.6 - 8.3 10e9/L    Absolute Lymphocytes 1.7 0.8 - 5.3 10e9/L    Absolute Monocytes 0.7 0.0 - 1.3 10e9/L    Absolute Eosinophils 0.1 0.0 - 0.7 10e9/L    Absolute Basophils 0.0 0.0 - 0.2 10e9/L    Diff Method Automated Method    ESR: Erythrocyte sedimentation rate   Result Value Ref Range    Sed Rate 10 0 - 20 mm/h   HCG Qual, Urine ([id number])   Result Value Ref Range    HCG Qual Urine Negative NEG^Negative   *UA reflex to Microscopic and Culture ([place] and [facility] (except [place] and [place])   Result Value Ref Range    Color Urine Yellow     Appearance Urine Clear     Glucose Urine Negative NEG^Negative mg/dL    Bilirubin Urine Negative NEG^Negative    Ketones Urine Trace (A) NEG^Negative mg/dL    Specific Gravity Urine 1.015 1.003 - 1.035    Blood Urine Negative NEG^Negative    pH Urine 6.0 5.0 - 7.0 pH    Protein Albumin Urine Negative NEG^Negative mg/dL    Urobilinogen Urine 0.2 0.2 - 1.0 EU/dL    Nitrite Urine Negative NEG^Negative     Leukocyte Esterase Urine Negative NEG^Negative    Source Midstream Urine             Assessment & Plan       ICD-10-CM    1. Acute bilateral low back pain with bilateral sciatica M54.42 CBC with platelets differential    M54.41 ESR: Erythrocyte sedimentation rate     Lyme Disease Valeria with reflex to WB Serum     Comprehensive metabolic panel     ibuprofen (ADVIL/MOTRIN) 600 MG tablet   2. Nausea R11.0 CBC with platelets differential     ESR: Erythrocyte sedimentation rate     Lyme Disease Valeria with reflex to WB Serum     Comprehensive metabolic panel     HCG Qual, Urine ([id number])     *UA reflex to Microscopic and Culture ([place] and [facility] (except [place] and [place])   3. Chills R68.83 CBC with platelets differential     ESR: Erythrocyte sedimentation rate     Lyme Disease Valeria with reflex to WB Serum     Comprehensive metabolic panel   4. Atypical chest pain R07.89 CBC with platelets differential     ESR: Erythrocyte sedimentation rate     Lyme Disease Valeria with reflex to WB Serum     Comprehensive metabolic panel   5. Throbbing headache R51 CBC with platelets differential     ESR: Erythrocyte sedimentation rate     Lyme Disease Valeria with reflex to WB Serum     Comprehensive metabolic panel     ibuprofen (ADVIL/MOTRIN) 600 MG tablet        Unclear etiology/diagnosis with uncertain prognosis requiring further workup.  Broad Ddx for her constellation of symptoms.  Reviewed that her CBC and ESR are normal so this is unlikely a significant infectious etiology.  UA, UPT are negative.  I'm not clear if her thigh pain actually represents sciatica or myalgias given her other various symptoms.  Her exam including neuro exam is normal so I do not think we need to do any urgent imaging.  It's possible this all represents an acute viral syndrome.  We'll treat symptomatically with analgesics for now and she'll monitor her symptoms.  See pt instructions below.  She was agreeable with plan.    Patient Instructions    For now rest and drink fluids and eat a very bland diet: bananas, rice, apple sauce, toast.    For pain you can take ibuprofen and acetaminophen (Tylenol).  You can take them together.  You should not take more than 1,000 mg of acetaminophen at a time and no more than 3,000 mg per day.    If you pain persists see us next week for follow-up.    If your pain worsens or if you develop worrisome symptoms such as weakness or severe pain go to the [facility].      No follow-ups on file.    Angeline Ambrocio MD  [facility]

## 2019-08-02 LAB
ALBUMIN SERPL-MCNC: 4.1 G/DL (ref 3.4–5)
ALP SERPL-CCNC: 57 U/L (ref 40–150)
ALT SERPL W P-5'-P-CCNC: 34 U/L (ref 0–50)
ANION GAP SERPL CALCULATED.3IONS-SCNC: 6 MMOL/L (ref 3–14)
AST SERPL W P-5'-P-CCNC: 19 U/L (ref 0–45)
B BURGDOR IGG+IGM SER QL: 0.1 (ref 0–0.89)
BILIRUB SERPL-MCNC: 0.6 MG/DL (ref 0.2–1.3)
BUN SERPL-MCNC: 8 MG/DL (ref 7–30)
CALCIUM SERPL-MCNC: 8.9 MG/DL (ref 8.5–10.1)
CHLORIDE SERPL-SCNC: 105 MMOL/L (ref 94–109)
CO2 SERPL-SCNC: 26 MMOL/L (ref 20–32)
CREAT SERPL-MCNC: 0.55 MG/DL (ref 0.52–1.04)
GFR SERPL CREATININE-BSD FRML MDRD: >90 ML/MIN/{1.73_M2}
GLUCOSE SERPL-MCNC: 79 MG/DL (ref 70–99)
POTASSIUM SERPL-SCNC: 3.8 MMOL/L (ref 3.4–5.3)
PROT SERPL-MCNC: 7.3 G/DL (ref 6.8–8.8)
SODIUM SERPL-SCNC: 137 MMOL/L (ref 133–144)

## 2019-08-02 NOTE — RESULT ENCOUNTER NOTE
Please send results letter:  Your results are all normal.  Your urinalysis and urine pregnancy test were normal.  (The ketones on your urinalysis are consistent with not eating much.)  Your complete blood counts, comprehensive metabolic panel results (liver function, kidney function, blood sugar, and blood salts) and Lyme test are all negative.  Please contact the clinic if you are not feeling better.   Angeline Ambrocio MD

## 2019-08-04 ENCOUNTER — HOSPITAL ENCOUNTER (EMERGENCY)
Facility: CLINIC | Age: 39
Discharge: HOME | End: 2019-08-04
Attending: EMERGENCY MEDICINE | Admitting: EMERGENCY MEDICINE
Payer: COMMERCIAL

## 2019-08-04 ENCOUNTER — APPOINTMENT (OUTPATIENT)
Dept: GENERAL RADIOLOGY | Facility: CLINIC | Age: 39
End: 2019-08-04
Attending: EMERGENCY MEDICINE
Payer: COMMERCIAL

## 2019-08-04 VITALS
RESPIRATION RATE: 18 BRPM | WEIGHT: 127 LBS | BODY MASS INDEX: 28.47 KG/M2 | OXYGEN SATURATION: 98 % | SYSTOLIC BLOOD PRESSURE: 108 MMHG | DIASTOLIC BLOOD PRESSURE: 67 MMHG | HEART RATE: 80 BPM | TEMPERATURE: 98.1 F

## 2019-08-04 DIAGNOSIS — M54.50 ACUTE MIDLINE LOW BACK PAIN WITHOUT SCIATICA: ICD-10-CM

## 2019-08-04 LAB
ALBUMIN SERPL-MCNC: 3.6 G/DL (ref 3.4–5)
ALBUMIN UR-MCNC: 30 MG/DL
ALP SERPL-CCNC: 46 U/L (ref 40–150)
ALT SERPL W P-5'-P-CCNC: 23 U/L (ref 0–50)
ANION GAP SERPL CALCULATED.3IONS-SCNC: 6 MMOL/L (ref 3–14)
APPEARANCE UR: ABNORMAL
AST SERPL W P-5'-P-CCNC: 14 U/L (ref 0–45)
BASOPHILS # BLD AUTO: 0 10E9/L (ref 0–0.2)
BASOPHILS NFR BLD AUTO: 0.2 %
BILIRUB SERPL-MCNC: 0.6 MG/DL (ref 0.2–1.3)
BILIRUB UR QL STRIP: NEGATIVE
BUN SERPL-MCNC: 11 MG/DL (ref 7–30)
CALCIUM SERPL-MCNC: 8.3 MG/DL (ref 8.5–10.1)
CHLORIDE SERPL-SCNC: 106 MMOL/L (ref 94–109)
CO2 SERPL-SCNC: 27 MMOL/L (ref 20–32)
COLOR UR AUTO: YELLOW
CREAT SERPL-MCNC: 0.55 MG/DL (ref 0.52–1.04)
CRP SERPL-MCNC: <2.9 MG/L (ref 0–8)
DIFFERENTIAL METHOD BLD: NORMAL
EOSINOPHIL # BLD AUTO: 0.1 10E9/L (ref 0–0.7)
EOSINOPHIL NFR BLD AUTO: 2.4 %
ERYTHROCYTE [DISTWIDTH] IN BLOOD BY AUTOMATED COUNT: 13.1 % (ref 10–15)
GFR SERPL CREATININE-BSD FRML MDRD: >90 ML/MIN/{1.73_M2}
GLUCOSE SERPL-MCNC: 86 MG/DL (ref 70–99)
GLUCOSE UR STRIP-MCNC: NEGATIVE MG/DL
HCG UR QL: NEGATIVE
HCT VFR BLD AUTO: 36.2 % (ref 35–47)
HGB BLD-MCNC: 12.2 G/DL (ref 11.7–15.7)
HGB UR QL STRIP: NEGATIVE
IMM GRANULOCYTES # BLD: 0 10E9/L (ref 0–0.4)
IMM GRANULOCYTES NFR BLD: 0.2 %
KETONES UR STRIP-MCNC: 80 MG/DL
LACTATE BLD-SCNC: 0.6 MMOL/L (ref 0.7–2)
LEUKOCYTE ESTERASE UR QL STRIP: ABNORMAL
LIPASE SERPL-CCNC: 104 U/L (ref 73–393)
LYMPHOCYTES # BLD AUTO: 1.1 10E9/L (ref 0.8–5.3)
LYMPHOCYTES NFR BLD AUTO: 23.9 %
MCH RBC QN AUTO: 30.4 PG (ref 26.5–33)
MCHC RBC AUTO-ENTMCNC: 33.7 G/DL (ref 31.5–36.5)
MCV RBC AUTO: 90 FL (ref 78–100)
MONOCYTES # BLD AUTO: 0.5 10E9/L (ref 0–1.3)
MONOCYTES NFR BLD AUTO: 11.2 %
MUCOUS THREADS #/AREA URNS LPF: PRESENT /LPF
NEUTROPHILS # BLD AUTO: 2.9 10E9/L (ref 1.6–8.3)
NEUTROPHILS NFR BLD AUTO: 62.1 %
NITRATE UR QL: NEGATIVE
NRBC # BLD AUTO: 0 10*3/UL
NRBC BLD AUTO-RTO: 0 /100
PH UR STRIP: 6.5 PH (ref 5–7)
PLATELET # BLD AUTO: 212 10E9/L (ref 150–450)
POTASSIUM SERPL-SCNC: 3.9 MMOL/L (ref 3.4–5.3)
PROT SERPL-MCNC: 6.7 G/DL (ref 6.8–8.8)
RBC # BLD AUTO: 4.01 10E12/L (ref 3.8–5.2)
RBC #/AREA URNS AUTO: <1 /HPF (ref 0–2)
SODIUM SERPL-SCNC: 139 MMOL/L (ref 133–144)
SOURCE: ABNORMAL
SP GR UR STRIP: 1.02 (ref 1–1.03)
SQUAMOUS #/AREA URNS AUTO: 12 /HPF (ref 0–1)
TRANS CELLS #/AREA URNS HPF: <1 /HPF (ref 0–1)
UROBILINOGEN UR STRIP-MCNC: NORMAL MG/DL (ref 0–2)
WBC # BLD AUTO: 4.6 10E9/L (ref 4–11)
WBC #/AREA URNS AUTO: 2 /HPF (ref 0–5)

## 2019-08-04 PROCEDURE — 86140 C-REACTIVE PROTEIN: CPT | Performed by: EMERGENCY MEDICINE

## 2019-08-04 PROCEDURE — 96374 THER/PROPH/DIAG INJ IV PUSH: CPT

## 2019-08-04 PROCEDURE — 81025 URINE PREGNANCY TEST: CPT | Performed by: EMERGENCY MEDICINE

## 2019-08-04 PROCEDURE — 96361 HYDRATE IV INFUSION ADD-ON: CPT

## 2019-08-04 PROCEDURE — 81001 URINALYSIS AUTO W/SCOPE: CPT | Performed by: EMERGENCY MEDICINE

## 2019-08-04 PROCEDURE — 99284 EMERGENCY DEPT VISIT MOD MDM: CPT | Mod: Z6 | Performed by: EMERGENCY MEDICINE

## 2019-08-04 PROCEDURE — 85025 COMPLETE CBC W/AUTO DIFF WBC: CPT | Performed by: EMERGENCY MEDICINE

## 2019-08-04 PROCEDURE — 71046 X-RAY EXAM CHEST 2 VIEWS: CPT

## 2019-08-04 PROCEDURE — 83605 ASSAY OF LACTIC ACID: CPT | Performed by: EMERGENCY MEDICINE

## 2019-08-04 PROCEDURE — 80053 COMPREHEN METABOLIC PANEL: CPT | Performed by: EMERGENCY MEDICINE

## 2019-08-04 PROCEDURE — 83690 ASSAY OF LIPASE: CPT | Performed by: EMERGENCY MEDICINE

## 2019-08-04 PROCEDURE — 99284 EMERGENCY DEPT VISIT MOD MDM: CPT | Mod: 25

## 2019-08-04 PROCEDURE — 25000128 H RX IP 250 OP 636: Performed by: EMERGENCY MEDICINE

## 2019-08-04 RX ORDER — CYCLOBENZAPRINE HCL 10 MG
10 TABLET ORAL 3 TIMES DAILY PRN
Qty: 20 TABLET | Refills: 0 | Status: SHIPPED | OUTPATIENT
Start: 2019-08-04 | End: 2019-08-10

## 2019-08-04 RX ORDER — ONDANSETRON 2 MG/ML
4 INJECTION INTRAMUSCULAR; INTRAVENOUS EVERY 30 MIN PRN
Status: DISCONTINUED | OUTPATIENT
Start: 2019-08-04 | End: 2019-08-04 | Stop reason: HOSPADM

## 2019-08-04 RX ORDER — SODIUM CHLORIDE 9 MG/ML
1000 INJECTION, SOLUTION INTRAVENOUS CONTINUOUS
Status: DISCONTINUED | OUTPATIENT
Start: 2019-08-04 | End: 2019-08-04 | Stop reason: HOSPADM

## 2019-08-04 RX ADMIN — ONDANSETRON 4 MG: 2 INJECTION INTRAMUSCULAR; INTRAVENOUS at 12:41

## 2019-08-04 RX ADMIN — SODIUM CHLORIDE 1000 ML: 9 INJECTION, SOLUTION INTRAVENOUS at 12:41

## 2019-08-04 ASSESSMENT — ENCOUNTER SYMPTOMS
DIFFICULTY URINATING: 0
DIAPHORESIS: 0
NAUSEA: 1
FATIGUE: 1
SHORTNESS OF BREATH: 0
COLOR CHANGE: 0
VOMITING: 1
EYE REDNESS: 0
ARTHRALGIAS: 0
CHILLS: 1
CONFUSION: 0
FEVER: 0
BACK PAIN: 1
ABDOMINAL PAIN: 1
NECK STIFFNESS: 0
HEADACHES: 1

## 2019-08-04 NOTE — ED PROVIDER NOTES
History     Chief Complaint   Patient presents with     Back Pain     Onset one wek ago with lower back pain radiating to right shoulder and head with nausea and vomiting, denies injury.     SOCO Aguirre is a 39 year old female who presents for evaluation of 1 week history of lower back pain responsive to ibuprofen similar to back pain that she has had in the past.  However, pain the next day began radiating into her had and the following day into her chest.  In addition, she has diffuse abdominal pain.  She had nausea with one episode of vomiting 3 days ago.  She has had shaking chills off and on the past 3 days without fever or sweats.  She apparently went to the clinic 2 or 3 days ago at which time blood and urine was obtained and was unremarkable.  She notes intermittent hand numbness.  She currently denies diarrhea, skin rash, runny nose, cough.    I have reviewed the Medications, Allergies, Past Medical and Surgical History, and Social History in the [employer] system.  Past Medical History:   Diagnosis Date     NO ACTIVE PROBLEMS        Past Surgical History:   Procedure Laterality Date     DILATION AND CURETTAGE SUCTION N/A 9/15/2017    Procedure: DILATION AND CURETTAGE SUCTION;  Suction Dilation and Curettage ;  Surgeon: Gris Huerta MD;  Location: [facility] OR     HERNIA REPAIR      AGE 8 YEARS OLD       Family History   Problem Relation Age of Onset     Family History Negative Mother        Social History     Tobacco Use     Smoking status: Never Smoker     Smokeless tobacco: Never Used   Substance Use Topics     Alcohol use: No       Current Facility-Administered Medications   Medication     ondansetron (ZOFRAN) injection 4 mg     sodium chloride 0.9% infusion     Current Outpatient Medications   Medication     cyclobenzaprine (FLEXERIL) 10 MG tablet     ibuprofen (ADVIL/MOTRIN) 600 MG tablet     levonorgestrel-ethinyl estradiol (AVIANE,ALESSE,LESSINA) 0.1-20 MG-MCG per tablet     Prenatal Vit-Fe  Fumarate-FA (PRENATAL VITAMINS) 28-0.8 MG TABS     senna (SENOKOT) 8.6 MG tablet      No Known Allergies    Review of Systems   Constitutional: Positive for chills and fatigue. Negative for diaphoresis and fever.   HENT: Negative for congestion.    Eyes: Negative for redness.   Respiratory: Negative for shortness of breath.    Cardiovascular: Positive for chest pain.   Gastrointestinal: Positive for abdominal pain, nausea and vomiting.   Genitourinary: Negative for difficulty urinating.   Musculoskeletal: Positive for back pain. Negative for arthralgias and neck stiffness.   Skin: Negative for color change and rash.   Neurological: Positive for headaches.   Psychiatric/Behavioral: Negative for confusion.       Physical Exam   BP: 107/60  Pulse: 80  Heart Rate: 80  Temp: 96.3  F (35.7  C)  Resp: 18  Weight: 57.6 kg (127 lb)  SpO2: 96 %  Lying Orthostatic BP: 108/68  Lying Orthostatic Pulse: 67 bpm  Sitting Orthostatic BP: 103/69  Sitting Orthostatic Pulse: 67 bpm  Standing Orthostatic BP: 106/71  Standing Orthostatic Pulse: 63 bpm      Physical Exam   Constitutional: No distress.   HENT:   Head: Atraumatic.   Mouth/Throat: Oropharynx is clear and moist. No oropharyngeal exudate.   Eyes: Pupils are equal, round, and reactive to light. No scleral icterus.   Cardiovascular: Normal heart sounds and intact distal pulses.   Pulmonary/Chest: Breath sounds normal. No respiratory distress.   Abdominal: Soft. Bowel sounds are normal. There is no tenderness.   Musculoskeletal: She exhibits no edema or tenderness.   Skin: Skin is warm. No rash noted. She is not diaphoretic.       ED Course        Procedures             Labs Ordered and Resulted from Time of ED Arrival Up to the Time of Departure from the ED   COMPREHENSIVE METABOLIC PANEL - Abnormal; Notable for the following components:       Result Value    Calcium 8.3 (*)     Protein Total 6.7 (*)     All other components within normal limits   UA MACROSCOPIC WITH REFLEX TO  MICRO AND CULTURE - Abnormal; Notable for the following components:    Ketones Urine 80 (*)     Protein Albumin Urine 30 (*)     Leukocyte Esterase Urine Trace (*)     Squamous Epithelial /HPF Urine 12 (*)     Mucous Urine Present (*)     All other components within normal limits   LACTIC ACID WHOLE BLOOD - Abnormal; Notable for the following components:    Lactic Acid 0.6 (*)     All other components within normal limits   CBC WITH PLATELETS DIFFERENTIAL   CRP INFLAMMATION   LIPASE   HCG QUALITATIVE URINE   ORTHOSTATIC BLOOD PRESSURE AND PULSE            Assessments & Plan (with Medical Decision Making)   39-year-old female presents with a one-week history of back pain now with complaints of radiating back pain into her abdomen, chest and head.  Her exam is entirely unremarkable with exception of some bilateral paraspinous lumbar tenderness.  She has normal neurologic exam, chest and abdomen.  Differential is broad and includes sepsis, Sirs, pneumonia, UTI, other.  Laboratories were obtained including CBC, comprehensive metabolic panel, lipase, pregnancy tests all of which were unremarkable with exception of ketones in the urine.  Signs and symptoms consistent with acute low back pain presumably of musculoskeletal etiology.  I recommended scheduled use of ibuprofen and will add Flexeril.  I have recommended follow-up with primary physician.    I have reviewed the nursing notes.    I have reviewed the findings, diagnosis, plan and need for follow up with the patient.       Medication List      Started    cyclobenzaprine 10 MG tablet  Commonly known as:  FLEXERIL  10 mg, Oral, 3 TIMES DAILY PRN            Final diagnoses:   Acute midline low back pain without sciatica       8/4/2019   [facility], [place], EMERGENCY DEPARTMENT     Denton Pham MD  08/04/19 [id number]

## 2019-08-04 NOTE — ED AVS SNAPSHOT
[facility], [place], Emergency Department  [address]  [place] [address]  Phone:  [phone]  Fax:  [phone]                                    Sailaja Aguirre   MRN: [id number]    Department:  [facility], Emergency Department   Date of Visit:  8/4/2019           After Visit Summary Signature Page    I have received my discharge instructions, and my questions have been answered. I have discussed any challenges I see with this plan with the nurse or doctor.    ..........................................................................................................................................  Patient/Patient Representative Signature      ..........................................................................................................................................  Patient Representative Print Name and Relationship to Patient    ..................................................               ................................................  Date                                   Time    ..........................................................................................................................................  Reviewed by Signature/Title    ...................................................              ..............................................  Date                                               Time          22EPIC Rev 08/18

## 2019-10-15 ENCOUNTER — OFFICE VISIT (OUTPATIENT)
Dept: DERMATOLOGY | Facility: CLINIC | Age: 39
End: 2019-10-15
Payer: COMMERCIAL

## 2019-10-15 DIAGNOSIS — L81.1 MELASMA: Primary | ICD-10-CM

## 2019-10-15 RX ORDER — HYDROQUINONE 40 MG/G
CREAM TOPICAL
Qty: 56.8 G | Refills: 0 | Status: SHIPPED | OUTPATIENT
Start: 2019-10-15

## 2019-10-15 ASSESSMENT — PAIN SCALES - GENERAL: PAINLEVEL: NO PAIN (0)

## 2019-10-15 NOTE — LETTER
10/15/2019       RE: Sailaja Aguirre  [address]  [place] [address]     Dear Colleague,    Thank you for referring your patient, Sailaja Aguirre, to the [facility] DERMATOLOGY at [facility]. Please see a copy of my visit note below.    [facility] Dermatology Note      Dermatology Problem List:  1. Melasma  -Current tx: hydroquinone 4% cream, strict sun avoidance, sun screen    Encounter Date: Oct 15, 2019    CC:  Chief Complaint   Patient presents with     Derm Problem     Sailaja is here today to be seen for dark spots on her face.          History of Present Illness:  Ms. Sailaja Aguirre is a 39 year old female who is new to the clinic and presents for dark spots on her face. At today's visit, the patient notes the skin on her face has been darkening recently, especially around the eyes and some on her forehead. She states she has not taken any medications since 2014. The patient notes she was pregnant in 2017 which ended in a miscarriage. She does not take exogenous estrogen. The patient denies painful, itching, tingling or bleeding lesions unless otherwise noted.     Past Medical History:   Patient Active Problem List   Diagnosis     Need for Tdap vaccination     Prenatal care     Vitamin D deficiency     Not immune to rubella     Past Medical History:   Diagnosis Date     NO ACTIVE PROBLEMS      Past Surgical History:   Procedure Laterality Date     DILATION AND CURETTAGE SUCTION N/A 9/15/2017    Procedure: DILATION AND CURETTAGE SUCTION;  Suction Dilation and Curettage ;  Surgeon: Gris Huerta MD;  Location: [facility] OR     HERNIA REPAIR      AGE 8 YEARS OLD       Social History:   reports that she has never smoked. She has never used smokeless tobacco. She reports that she does not drink alcohol or use drugs.    Family History:  Family History   Problem Relation Age of Onset     Family History Negative Mother        Medications:  Current Outpatient  Medications   Medication Sig Dispense Refill     ibuprofen (ADVIL/MOTRIN) 600 MG tablet Take 1 tablet (600 mg) by mouth every 6 hours as needed for moderate pain 30 tablet 0     levonorgestrel-ethinyl estradiol (AVIANE,ALESSE,LESSINA) 0.1-20 MG-MCG per tablet Take 1 tablet by mouth daily 84 tablet 4     Prenatal Vit-Fe Fumarate-FA (PRENATAL VITAMINS) 28-0.8 MG TABS Take 1 Dose by mouth daily 100 tablet 3     senna (SENOKOT) 8.6 MG tablet Take 2 tablets by mouth 2 times daily as needed for constipation 120 tablet 0       No Known Allergies    Review of Systems:  -Constitutional: The patient denies fatigue, fevers, chills, unintended weight loss, and night sweats.  -HEENT: Patient denies nonhealing oral sores.  -Skin: As above in HPI. No additional skin concerns.    Physical exam:  Vitals: There were no vitals taken for this visit.  GEN: This is a well developed, well-nourished female in no acute distress, in a pleasant mood.    SKIN: Focused examination of the face and neck was performed.  -Hyperpigmented patches along the convex surfaces of the forehead and bilateral cheeks  -No other lesions of concern on areas examined.       Impression/Plan:  1. Melasma    Recommended daily application of [employer] UV Fusion sunscreen and strict avoidance of sun exposure    Start hydroquinone 4% external cream, apply topically to the entire face BID for up to 12 weeks, then discontinue. Edu on potential for darkening of skin, if this occurs patient to discontinue right away    May consider addition of kojic and retinoid in the future    CC Dr. Martell on close of this encounter.  Follow-up in 3 months, earlier for new or changing lesions.       Staff Involved:  Staff/Scribe    Scribe Disclosure:  I, Saurav Rincon, am serving as a scribe to document services personally performed by Sobeida Forrester PA-C, based on data collection and the provider's statements to me.     Provider Disclosure:   The documentation recorded by the  scribe accurately reflects the services I personally performed and the decisions made by me.    All risks, benefits and alternatives were discussed with patient.  Patient is in agreement and understands the assessment and plan.  All questions were answered.    Sobeida Forrester PA-C  [facility]: Phone: [phone], Fax: [phone]

## 2019-10-15 NOTE — NURSING NOTE
Dermatology Rooming Note    Sailaja Aguirre's goals for this visit include:   Chief Complaint   Patient presents with     Derm Problem     Sailaja is here today to be seen for dark spots on her face.      JENNIFER Perez

## 2019-10-15 NOTE — PROGRESS NOTES
[facility] Dermatology Note      Dermatology Problem List:  1. Melasma  -Current tx: hydroquinone 4% cream, strict sun avoidance, sun screen    Encounter Date: Oct 15, 2019    CC:  Chief Complaint   Patient presents with     Derm Problem     Sailaja is here today to be seen for dark spots on her face.          History of Present Illness:  Ms. Sailaja Aguirre is a 39 year old female who is new to the clinic and presents for dark spots on her face. At today's visit, the patient notes the skin on her face has been darkening recently, especially around the eyes and some on her forehead. She states she has not taken any medications since 2014. The patient notes she was pregnant in 2017 which ended in a miscarriage. She does not take exogenous estrogen. The patient denies painful, itching, tingling or bleeding lesions unless otherwise noted.     Past Medical History:   Patient Active Problem List   Diagnosis     Need for Tdap vaccination     Prenatal care     Vitamin D deficiency     Not immune to rubella     Past Medical History:   Diagnosis Date     NO ACTIVE PROBLEMS      Past Surgical History:   Procedure Laterality Date     DILATION AND CURETTAGE SUCTION N/A 9/15/2017    Procedure: DILATION AND CURETTAGE SUCTION;  Suction Dilation and Curettage ;  Surgeon: Gris Huerta MD;  Location: [facility] OR     HERNIA REPAIR      AGE 8 YEARS OLD       Social History:   reports that she has never smoked. She has never used smokeless tobacco. She reports that she does not drink alcohol or use drugs.    Family History:  Family History   Problem Relation Age of Onset     Family History Negative Mother        Medications:  Current Outpatient Medications   Medication Sig Dispense Refill     ibuprofen (ADVIL/MOTRIN) 600 MG tablet Take 1 tablet (600 mg) by mouth every 6 hours as needed for moderate pain 30 tablet 0     levonorgestrel-ethinyl estradiol (AVIANE,ALESSE,LESSINA) 0.1-20 MG-MCG per tablet Take 1 tablet by mouth  daily 84 tablet 4     Prenatal Vit-Fe Fumarate-FA (PRENATAL VITAMINS) 28-0.8 MG TABS Take 1 Dose by mouth daily 100 tablet 3     senna (SENOKOT) 8.6 MG tablet Take 2 tablets by mouth 2 times daily as needed for constipation 120 tablet 0       No Known Allergies    Review of Systems:  -Constitutional: The patient denies fatigue, fevers, chills, unintended weight loss, and night sweats.  -HEENT: Patient denies nonhealing oral sores.  -Skin: As above in HPI. No additional skin concerns.    Physical exam:  Vitals: There were no vitals taken for this visit.  GEN: This is a well developed, well-nourished female in no acute distress, in a pleasant mood.    SKIN: Focused examination of the face and neck was performed.  -Hyperpigmented patches along the convex surfaces of the forehead and bilateral cheeks  -No other lesions of concern on areas examined.       Impression/Plan:  1. Melasma    Recommended daily application of SkinCeuticals UV Fusion sunscreen and strict avoidance of sun exposure    Start hydroquinone 4% external cream, apply topically to the entire face BID for up to 12 weeks, then discontinue. Edu on potential for darkening of skin, if this occurs patient to discontinue right away    May consider addition of kojic and retinoid in the future    CC Dr. Martell on close of this encounter.  Follow-up in 3 months, earlier for new or changing lesions.       Staff Involved:  Staff/Scribe    Scribe Disclosure:  I, Saurav Rincon, am serving as a scribe to document services personally performed by Sobeida Forrester PA-C, based on data collection and the provider's statements to me.     Provider Disclosure:   The documentation recorded by the scribe accurately reflects the services I personally performed and the decisions made by me.    All risks, benefits and alternatives were discussed with patient.  Patient is in agreement and understands the assessment and plan.  All questions were answered.    Sobeida Forrester  CAMERON  [facility]: Phone: [phone], Fax: [phone]

## 2019-10-15 NOTE — PATIENT INSTRUCTIONS
Sun protective clothing and Resources     Lands End (www.Appdra.com)  Athleta (www.[email or web address].[email or web address] Networks)  Kayla Life ([email or web address] Networks)  Carve Designs ([employer]) - affordable  Skinz (uvskinz.com)    Long sleeve - Olayinka Cool DRI UPF 50 or Ashton PFG UPF 50  Hoodie - Ashton PFG UPF 50  Swimshirt/Rash Guard - Daniella UPF 50 (on Amazon)  Neck - Outdoor Research Ubertubes (www.outdoorresSilvercare Solutions.com)  The ABCDEs of Melanoma    Skin cancer can develop anywhere on the skin. Ask someone for help when checking your skin, especially in hard to see places. If you notice a mole different from others, or that changes, enlarges, itches, or bleeds (even if it is small), you should see a dermatologist.

## 2019-10-24 ENCOUNTER — TELEPHONE (OUTPATIENT)
Dept: DERMATOLOGY | Facility: CLINIC | Age: 39
End: 2019-10-24

## 2019-10-24 NOTE — TELEPHONE ENCOUNTER
Prior Authorization Retail Medication Request    Medication/Dose: hydroquinone 4% cream  ICD code (if different than what is on RX): L81.1  Previously Tried and Failed:   Rationale:     Insurance Name: PREFERREDONE  Insurance ID: [id number]       Pharmacy Information (if different than what is on RX)  Name:    Phone:

## 2019-10-28 NOTE — TELEPHONE ENCOUNTER
Central Prior Authorization Team   Phone: [phone]      PA Initiation    Medication: hydroquinone 4% cream-PA initiated  Insurance Company: [employer] - Phone [phone] Fax [phone]  Pharmacy Filling the Rx: [id number] [place], [place] - [address] AT [address]  Filling Pharmacy Phone: [phone]  Filling Pharmacy Fax:    Start Date: 10/28/2019

## 2019-10-29 NOTE — TELEPHONE ENCOUNTER
PRIOR AUTHORIZATION DENIED    Medication: hydroquinone 4% cream-PA denied    Denial Date: 10/28/2019    Denial Rational: Medication is excluded            Appeal Information:

## 2019-10-29 NOTE — TELEPHONE ENCOUNTER
Called pt and let her know message below. Pt understood and had no questions or concerns.  JENNIFER Perez

## 2020-03-30 ENCOUNTER — PRENATAL OFFICE VISIT (OUTPATIENT)
Dept: OBGYN | Facility: CLINIC | Age: 40
End: 2020-03-30
Payer: COMMERCIAL

## 2020-03-30 VITALS — WEIGHT: 135 LBS | BODY MASS INDEX: 26.5 KG/M2 | HEIGHT: 60 IN

## 2020-03-30 DIAGNOSIS — Z34.90 SUPERVISION OF NORMAL PREGNANCY: Primary | ICD-10-CM

## 2020-03-30 PROCEDURE — 99207 ZZC NO CHARGE NURSE ONLY: CPT

## 2020-03-30 ASSESSMENT — MIFFLIN-ST. JEOR: SCORE: 1213.86

## 2020-03-30 NOTE — PROGRESS NOTES
Pt was scheduled with the [facility] RN for an OB intake even though pt plans to deliver at [place] as she lives in [place].  Pt has a history of irregular periods, she states she can go a couple months without getting a period.  She is unsure when her LMP was, she is thinking at the beginning of January, 2020.  Will route to [place] nurses to help the pt to schedule a New Prenatal office visit with a provider.      Prenatal OB Questionnaire  Patient supplied answers from flow sheet for:  Prenatal OB Questionnaire.  Past Medical History  Diabetes?: No  Hypertension : No  Heart disease, mitral valve prolapse or rheumatic fever?: No  An autoimmune disease such as lupus or rheumatoid arthritis?: No  Kidney disease or urinary tract infection?: No  Epilepsy, seizures or spells?: No  Migraine headaches?: No  A stroke or loss of function or sensation?: No  Any other neurological problems?: No  Have you ever been treated for depression?: No  Are you having problems with crying spells or loss of self-esteem?: No  Have you ever required psychiatric care?: No  Have you ever had hepatitis, liver disease or jaundice?: No  Have you been treated for blood clots in your veins, deep vein thromosis, inflammation in the veins, thrombosis, phlebitis, pulmonary embolism or varicosities?: No  Have you had excessive bleeding after surgery or dental work?: No  Do you bleed more than other women after a cut or scratch?: No  Do you have a history of anemia?: No  Have you ever had thyroid problems or taken thyroid medication?: No   Do you have any endocrine problems?: No  Have you ever been in a major accident or suffered serious trauma?: No  Within the last year, has anyone hit, slapped, kicked or otherwise hurt you?: No  In the last year, has anyone forced you to have sex when you didn't want to?: No    Past Medical History 2   Have you ever received a blood transfusion?: No  Would you refuse a blood transfusion if a doctor judged  it to be medically necessary?: No  Does anyone in your home smoke?: No  Do you use tobacco products?: No  Do you drink beer, wine or hard liquor?: No  Do you use any of the following: marijuana, speed, cocaine, heroin, hallucinogens or other drugs?: No   Is your blood type Rh negative?: Unknown  Have you ever had abnormal antibodies in your blood?: No  Have you ever had asthma?: No  Have you ever had tuberculosis?: No  Do you have any allergies to drugs or over-the-counter medications?: No  Allergies: Dust Mites, Aspartame, Ethanol, Venlafaxine, Hydrochloride, Sertraline: No  Have you had any breast problems?: No  Have you ever ?: (!) Yes  Have you had any gynecological surgical procedures such as cervical conization, a LEEP procedure, laser treatment, cryosurgery of the cervix or a dilation and curettage, etc?: (!) Yes(2017-suction D&C)  Have you ever had any other surgical procedures?: No  Have you been hospitalized for a nonsurgical reason excluding normal delivery?: No  Have you ever had any anesthetic complications?: No  Have you ever had an abnormal pap smear?: No    Past Medical History (Continued)  Do you have a history of abnormalities of the uterus?: No  Did your mother take HIRA or any other hormones when she was pregnant with you?: No  Did it take you more than a year to become pregnant?: No  Have you ever been evaluated or treated for infertility?: No  Is there a history of medical problems in your family, which you feel may be important to this pregnancy?: No  Do you have any other problems we have not asked about which you feel may be important to this pregnancy?: No    Symptoms since last menstrual period  Do you have any of the following symptoms: abdominal pain, blood in stools or urine, chest pain, shortness of breath, coughing or vomiting up blood, your heart racing or skipping beats, nausea and vomiting, pain on urination or vaginal discharge or bleed: No  Will the patient be 35 years  old or older at the time of delivery?: (!) Yes    Has the patient, baby's father or anyone in either family had:  Thalassemia (Italian, Greek, Mediterranean or  background only) and an MCV result less than 80?: No  Neural tube defect such as meningomyelocele, spina bifida or anencephaly?: No  Congenital heart defect?: No  Down's Syndrome?: No  Osmel-Sachs disease (Alevism, Cajun, Greek-Pocahontas)?: No  Sickle cell disease or trait ()?: No  Hemophilia or other inherited problems of blood?: No  Muscular dystrophy?: No  Cystic fibrosis?: No  Finney's chorea?: No  Mental retardation/autism?: No  Any other inherited genetic or chromosomal disorder?: No  Maternal metabolic disorder (e.g Insulin-dependent diabetes, PKU)?: No  A child with birth defects not listed above?: No  Recurrent pregnancy loss or stillbirth?: No   Has the patient had any medications/street drugs/alcohol since her last menstrual period?: No  Does the patient or baby's father have any other genetic risks?: No    Infection History   Do you object to being tested for Hepatitis B?: No  Do you object to being tested for HIV?: No   Do you feel that you are at high risk for coming in contact with the AIDS virus?: No  Have you ever been treated for tuberculosis?: No  Have you ever had a positive skin test for tuberculosis?: No  Do you live with someone who has tuberculosis?: No  Have you ever been exposed to tuberculosis?: No  Do you have genital herpes?: No  Does your partner have genital herpes?: No  Have you had a viral illness since your last period?: No  Have you ever had gonorrhea, chlamydia, syphilis, venereal warts, trichomoniasis, pelvic inflammatory disease or any other sexually transmitted disease?: No  Do you know if you are a genital group B streptococcus carrier?: No  Have you had chicken pox/varicella?: No   Have you been vaccinated against chicken Pox?: (!) Yes  Have you had any other infectious diseases?: No      Allergies as of  3/30/2020:    Allergies as of 03/30/2020     (No Known Allergies)       Current medications are:  Current Outpatient Medications   Medication Sig Dispense Refill     Prenatal Vit-Fe Fumarate-FA (PRENATAL VITAMINS) 28-0.8 MG TABS Take 1 Dose by mouth daily 100 tablet 3     hydroquinone (CLOTILDE) 4 % external cream Apply topically to the entire face BID for up to 12 weeks, then discontinue (Patient not taking: Reported on 3/30/2020) 56.8 g 0     ibuprofen (ADVIL/MOTRIN) 600 MG tablet Take 1 tablet (600 mg) by mouth every 6 hours as needed for moderate pain (Patient not taking: Reported on 3/30/2020) 30 tablet 0     senna (SENOKOT) 8.6 MG tablet Take 2 tablets by mouth 2 times daily as needed for constipation (Patient not taking: Reported on 3/30/2020) 120 tablet 0         Early ultrasound screening tool:    Does patient have irregular periods?  Yes  Did patient use hormonal birth control in the three months prior to positive urine pregnancy test? No  Is the patient breastfeeding?  No  Is the patient 10 weeks or greater at time of education visit?  unknown.  Pt states her periods are very irregular.  She thinks her last LMP was at the beginning of January, 2020.    US ordered due to the above questions.      Ronda Maya RN

## 2020-03-31 ENCOUNTER — TELEPHONE (OUTPATIENT)
Dept: OBGYN | Facility: CLINIC | Age: 40
End: 2020-03-31

## 2020-03-31 NOTE — TELEPHONE ENCOUNTER
I did a telephone OB intake visit with pt yesterday.  Pt plans to deliver at [place].  She is also requesting to do her prenatal care through [place] as she lives in [facility].  Please assist pt in scheduling pt with a [facility] provider for a New Prenatal visit.  Pt is SHAYYJACOB, 39 years old.  She is unsure of her LMP as she goes months without her period but thinks her last LMP was the beginning of January, 2020, which would put her between 12 and 13 weeks gestation.    Ronda Maya RN

## 2020-04-01 NOTE — TELEPHONE ENCOUNTER
Will close encounter at this time as it looks like pt is now scheduled for a new prenatal appt with a [facility] provider tomorrow, 4/2.    Ronda Maya RN

## 2020-04-02 ENCOUNTER — PRENATAL OFFICE VISIT (OUTPATIENT)
Dept: OBGYN | Facility: CLINIC | Age: 40
End: 2020-04-02
Payer: COMMERCIAL

## 2020-04-02 ENCOUNTER — TRANSCRIBE ORDERS (OUTPATIENT)
Dept: MATERNAL FETAL MEDICINE | Facility: CLINIC | Age: 40
End: 2020-04-02

## 2020-04-02 VITALS
TEMPERATURE: 98.6 F | DIASTOLIC BLOOD PRESSURE: 73 MMHG | HEART RATE: 71 BPM | WEIGHT: 133 LBS | SYSTOLIC BLOOD PRESSURE: 111 MMHG | BODY MASS INDEX: 25.97 KG/M2

## 2020-04-02 DIAGNOSIS — O26.90 PREGNANCY RELATED CONDITION, ANTEPARTUM: Primary | ICD-10-CM

## 2020-04-02 DIAGNOSIS — O09.521 ENCOUNTER FOR SUPERVISION OF MULTIGRAVIDA OF ADVANCED MATERNAL AGE IN FIRST TRIMESTER: Primary | ICD-10-CM

## 2020-04-02 DIAGNOSIS — Z12.4 SCREENING FOR MALIGNANT NEOPLASM OF CERVIX: ICD-10-CM

## 2020-04-02 LAB
ABO + RH BLD: NORMAL
ABO + RH BLD: NORMAL
BLD GP AB SCN SERPL QL: NORMAL
BLOOD BANK CMNT PATIENT-IMP: NORMAL
ERYTHROCYTE [DISTWIDTH] IN BLOOD BY AUTOMATED COUNT: 13.9 % (ref 10–15)
HCT VFR BLD AUTO: 35.3 % (ref 35–47)
HGB BLD-MCNC: 12.3 G/DL (ref 11.7–15.7)
MCH RBC QN AUTO: 31.1 PG (ref 26.5–33)
MCHC RBC AUTO-ENTMCNC: 34.8 G/DL (ref 31.5–36.5)
MCV RBC AUTO: 89 FL (ref 78–100)
PLATELET # BLD AUTO: 239 10E9/L (ref 150–450)
RBC # BLD AUTO: 3.95 10E12/L (ref 3.8–5.2)
SPECIMEN EXP DATE BLD: NORMAL
WBC # BLD AUTO: 5.6 10E9/L (ref 4–11)

## 2020-04-02 PROCEDURE — 87389 HIV-1 AG W/HIV-1&-2 AB AG IA: CPT | Performed by: OBSTETRICS & GYNECOLOGY

## 2020-04-02 PROCEDURE — 87624 HPV HI-RISK TYP POOLED RSLT: CPT | Performed by: OBSTETRICS & GYNECOLOGY

## 2020-04-02 PROCEDURE — 36415 COLL VENOUS BLD VENIPUNCTURE: CPT | Performed by: OBSTETRICS & GYNECOLOGY

## 2020-04-02 PROCEDURE — 85027 COMPLETE CBC AUTOMATED: CPT | Performed by: OBSTETRICS & GYNECOLOGY

## 2020-04-02 PROCEDURE — 86900 BLOOD TYPING SEROLOGIC ABO: CPT | Performed by: OBSTETRICS & GYNECOLOGY

## 2020-04-02 PROCEDURE — 87086 URINE CULTURE/COLONY COUNT: CPT | Performed by: OBSTETRICS & GYNECOLOGY

## 2020-04-02 PROCEDURE — G0145 SCR C/V CYTO,THINLAYER,RESCR: HCPCS | Performed by: OBSTETRICS & GYNECOLOGY

## 2020-04-02 PROCEDURE — 86901 BLOOD TYPING SEROLOGIC RH(D): CPT | Performed by: OBSTETRICS & GYNECOLOGY

## 2020-04-02 PROCEDURE — 87340 HEPATITIS B SURFACE AG IA: CPT | Performed by: OBSTETRICS & GYNECOLOGY

## 2020-04-02 PROCEDURE — 99207 ZZC FIRST OB VISIT: CPT | Performed by: OBSTETRICS & GYNECOLOGY

## 2020-04-02 PROCEDURE — 86762 RUBELLA ANTIBODY: CPT | Performed by: OBSTETRICS & GYNECOLOGY

## 2020-04-02 PROCEDURE — G0124 SCREEN C/V THIN LAYER BY MD: HCPCS | Performed by: OBSTETRICS & GYNECOLOGY

## 2020-04-02 PROCEDURE — 86780 TREPONEMA PALLIDUM: CPT | Performed by: OBSTETRICS & GYNECOLOGY

## 2020-04-02 PROCEDURE — 86850 RBC ANTIBODY SCREEN: CPT | Performed by: OBSTETRICS & GYNECOLOGY

## 2020-04-02 RX ORDER — PRENATAL VIT/IRON FUM/FOLIC AC 27MG-0.8MG
1 TABLET ORAL DAILY
Qty: 90 TABLET | Refills: 3 | Status: SHIPPED | OUTPATIENT
Start: 2020-04-02 | End: 2020-07-29

## 2020-04-02 NOTE — LETTER
April 8, 2020    Sailaja Aguirre  [address] [facility] [facility] [address]      Dear ,      This letter is in regards to your recent cervical cancer screening (Pap smear and HPV test).    Your Pap smear result was reported as ASCUS or Atypical Squamous Cells of Undetermined Significance. This means that there were mildly abnormal cells found in the sample that we collected from your cervix. The vast majority of patients with this result do not have significant cervical abnormalities.     Your cervical sample was also tested for the presence of Human Papillomavirus (HPV). Your HPV test is NEGATIVE for high risk HPV, meaning that no HPV was found at this time.     Over time, your body can get rid of these abnormal cells, so it is recommended that you repeat your Pap smear and HPV test in 3 years.    If you have additional questions regarding this result, please call our registered nurse, Mary at [phone].    Please continue to be seen every year for an annual physical exam and other preventative tests.         Sincerely,    Laquita Ruiz MD//[facility]

## 2020-04-02 NOTE — PATIENT INSTRUCTIONS
The best way to prevent an infection is to avoid being exposed to the virus. We recommend that you wash your hands frequently and avoid touching your eyes, nose or mouth.  Practice social distancing by staying home as much as possible, avoiding gatherings and minimize trips for essentials. You should especially avoid any contact with people who are sick. It is possible that people who have the virus have little or no symptoms which is why social distancing is so important to avoid spreading the virus. Clean frequently touched surfaces daily. If you do start to have symptoms such as cough, fever or mild shortness of breath, you should stay home for at least 14 days.  If your symptoms are worrisome or severe or you are scheduled during this time to have a clinic visit you should call us at [phone].    Due to anticipated shortage of blood products we recommend all pregnant women take an iron supplement (ferrous gluconate or ferrous sulfate) in addition to a prenatal vitamin.     The hospital has strict visitor restrictions at this time for your safety. Please be aware that visitor restrictions are subject to change. You are allowed to have one healthy adult visitor during your hospital stay, it must be the same person the entire time and they must stay with you at the hospital the entire time (they are not allowed to come and go).     For information about Covid-19 and pregnancy we look to the center for disease control, the CDC. You can look at this information online at:   https://www.cdc.gov/coronavirus/2019-ncov/hcp/pregnant-women-faq.html

## 2020-04-02 NOTE — PROGRESS NOTES
OB - New OB History and Physical  Date of visit: 2020  Chief Complaint: To establish prenatal care    HPI: Sailaja Aguirre is a 39 year old  at 13w1d as dated by LMP. Estimated Date of Delivery: Oct 7, 2020. Patient's last menstrual period was 2020, approximate.   Since becoming pregnant, patient reports feeling very hungry and tired. Some bleeding last month. No further bleeding. No pain. Anxious about this pregnancy because last pregnancy was a miscarriage.    Ultrasound: not yet    Obstetric history:     OB History    Para Term  AB Living   5 2 2 0 2 2   SAB TAB Ectopic Multiple Live Births   1 0 0 0 2      # Outcome Date GA Lbr Ronnie/2nd Weight Sex Delivery Anes PTL Lv   5 Current            4 Term 13 40w0d  3.402 kg (7 lb 8 oz) F  None N SHANTHI   3 Term 06 40w0d  2.892 kg (6 lb 6 oz) F  EPI N SHANTHI   2 SAB            1 AB      AB, MISSED        Patient denies history of gestational hypertension, pre-eclampsia, gestational diabetes,  labor, postpartum hemorrhage, postpartum depression    Gynecologic History:   Patient's last menstrual period was 2020 (lmp unknown).   STI history: none  Last Pap:  NIL  History of abnormal pap: no  =  Allergy: Patient has no known allergies.  Patient denies food, latex or environmental allergies.     Current Medications:  Current Outpatient Medications   Medication     hydroquinone (CLOTILDE) 4 % external cream     ibuprofen (ADVIL/MOTRIN) 600 MG tablet     Prenatal Vit-Fe Fumarate-FA (PRENATAL VITAMINS) 28-0.8 MG TABS     senna (SENOKOT) 8.6 MG tablet     No current facility-administered medications for this visit.        Past Medical History:  Past Medical History:   Diagnosis Date     NO ACTIVE PROBLEMS        Past Surgical History:  Past Surgical History:   Procedure Laterality Date     DILATION AND CURETTAGE SUCTION N/A 9/15/2017    Procedure: DILATION AND CURETTAGE SUCTION;  Suction Dilation and Curettage ;   Surgeon: Gris Huerta MD;  Location[facility]     HERNIA REPAIR      AGE 8 YEARS OLD     Social History:  Patient lives in [place] with boyfriend and 2 children.    Works as .   Denies current tobacco, alcohol or recreational drug use.   She feels safe in her relationship. Patient denies history of sexual, physical or mental abuse.     Family History:  Family History   Problem Relation Age of Onset     Family History Negative Mother        Review of Systems  Gen:  no change in weight, no fever, no chills, no fatigue  CV: no palpitations, no chest pain, no hypertension, no syncope  Resp: no shortness of breath, no cough, no wheezing, no asthma  GI: no nausea, no vomiting, no diarrhea, no constipation, no bloating, no GERD  :  no vaginal discharge, no dysuria, no abnormal bleeding, no pelvic pain   Endo: no thyroid problems, no cold/heat intolerance, no acne, no hirsutism, no diabetes  Heme: no easy bruising or bleeding, no history of DVT/PE/CVA  Neuro: no headaches, no seizures, no strokes, no focal deficits      Physical Exam:  Vitals:    04/02/20 1314   BP: 111/73   Pulse: 71   Temp: 98.6  F (37  C)   TempSrc: Oral   Weight: 60.3 kg (133 lb)     Body mass index is 25.97 kg/m .  Gen: alert, oriented, no distress, very pleasant, appears stated age, well groomed  Neck: supple, trachea midline, no thyromegaly, no lymphadenopathy  HEENT: head normocephalic, atraumatic, normal oropharynx without erythema or exudates  CV: normal heart sounds, regular rate and rhythm, no murmurs  Resp: good inspiratory effort, lungs clear to ascultation bilaterally, no wheezes or rhonchi  Abd: soft, nontender, nondistended  : normal external genitalia with lesions or erythema; normal, well supported urethra, normal Bartholins, normal Skenes; normal pink rugated vaginal mucosa, no lesions or abnormal discharge; medium graves speculum inserted without difficulty; normal appearing cervix without lesions, bleeding or  discharge. Bimanual exam shows 12week sized ante verted uterus, mobile, no fundal tenderness, no CMT, no adnexal masses or tenderness. Cervix long and closed  Extr: warm, well perfused, nontender, no edema  Psych: affect bright, cooperative, responds appropriately    BSUS: single viable IUP measuring 13w0d with + fetal cardiac activity    Assessment:  Sailaja Aguirre is a 39 year old  at 13w1d presenting to establish prenatal care.    Problem List:   Advanced maternal age    Plan:  1. Reviewed routine prenatal care. Discussed MD call schedule as well as role of residents and med students both in clinic and hospital.  She is okay  with resident care  2. Pap: done today   3. Diet, Nutrition and Exercise:  Continue PNVs. Continue normal exercise. Her prepregnancy BMI is 25.  According to the WHO guidelines, patient is given a goal of gaining approximately 15-25 pounds during the course of her pregnancy.    4. Immunizations: plan TdaP at 28 weeks  5. Fetal anomaly screening: interested in NIPT, consult placed. Plan level 2 for AMA  6. Routine Prenatal Care: the patient will have phone visit in 4 weeks per modified prenatal appt schedule for COVID 19, sooner prn    Laquita Ruiz MD

## 2020-04-03 LAB
BACTERIA SPEC CULT: NO GROWTH
HBV SURFACE AG SERPL QL IA: NONREACTIVE
HIV 1+2 AB+HIV1 P24 AG SERPL QL IA: NONREACTIVE
RUBV IGG SERPL IA-ACNC: 10 IU/ML
SPECIMEN SOURCE: NORMAL
T PALLIDUM AB SER QL: NONREACTIVE

## 2020-04-06 LAB
COPATH REPORT: ABNORMAL
PAP: ABNORMAL

## 2020-04-07 LAB
FINAL DIAGNOSIS: NORMAL
HPV HR 12 DNA CVX QL NAA+PROBE: NEGATIVE
HPV16 DNA SPEC QL NAA+PROBE: NEGATIVE
HPV18 DNA SPEC QL NAA+PROBE: NEGATIVE
SPECIMEN DESCRIPTION: NORMAL
SPECIMEN SOURCE CVX/VAG CYTO: NORMAL

## 2020-04-08 PROBLEM — R87.610 ASCUS OF CERVIX WITH NEGATIVE HIGH RISK HPV: Status: ACTIVE | Noted: 2020-04-02

## 2020-04-30 ENCOUNTER — PRENATAL OFFICE VISIT (OUTPATIENT)
Dept: OBGYN | Facility: CLINIC | Age: 40
End: 2020-04-30
Payer: COMMERCIAL

## 2020-04-30 DIAGNOSIS — Z34.80 SUPERVISION OF OTHER NORMAL PREGNANCY, ANTEPARTUM: Primary | ICD-10-CM

## 2020-04-30 PROCEDURE — 99207 ZZC PRENATAL VISIT: CPT | Performed by: OBSTETRICS & GYNECOLOGY

## 2020-05-14 ENCOUNTER — APPOINTMENT (OUTPATIENT)
Dept: INTERPRETER SERVICES | Facility: CLINIC | Age: 40
End: 2020-05-14
Payer: COMMERCIAL

## 2020-05-14 ENCOUNTER — OFFICE VISIT (OUTPATIENT)
Dept: MATERNAL FETAL MEDICINE | Facility: CLINIC | Age: 40
End: 2020-05-14
Attending: OBSTETRICS & GYNECOLOGY
Payer: COMMERCIAL

## 2020-05-14 DIAGNOSIS — O09.529 AMA (ADVANCED MATERNAL AGE) MULTIGRAVIDA 35+: ICD-10-CM

## 2020-05-14 DIAGNOSIS — O26.90 PREGNANCY RELATED CONDITION, ANTEPARTUM: ICD-10-CM

## 2020-05-14 DIAGNOSIS — Z36.9 ENCOUNTER FOR ANTENATAL SCREENING OF MOTHER: Primary | ICD-10-CM

## 2020-05-14 PROCEDURE — 40000072 ZZH STATISTIC GENETIC COUNSELING, < 16 MIN: Mod: ZF | Performed by: GENETIC COUNSELOR, MS

## 2020-05-14 NOTE — PROGRESS NOTES
[facility]  Genetic Counseling Consult    Patient:  Sailaja Aguirre YOB: 1980   Date of Service:  20      Sailaja Aguirre was evaluated via a billable telephone visit at [facility] [place] for genetic consultation as part of her appointment for comprehensive ultrasound.  The indication for genetic counseling is advanced maternal age. The patient was unaccompanied for today's telephone consult. [facility]  Damon     The patient has been notified of following:    This telephone visit will be conducted via a call between you and your physician/provider. We have found that certain health care needs can be provided without the need for a physical exam. This service lets us provide the care you need with a short phone conversation. If a prescription is necessary we can send it directly to your pharmacy. If lab work is needed we can place an order for that and you can then stop by our lab to have the test done at a later time.     If during the course of the call the provider feels a telephone visit is not appropriate, you will not be charged for this service.       Impression/Plan:   1. Sailaja {Serum Screenin}    2. Sailaja had a comprehensive (level II) ultrasound today. Please see the ultrasound report for further details.    3. { Declines-Accepts Testin}    Pregnancy History:   /Parity:     Age at Delivery: 40 year old  EZEQUIEL: 10/7/2020, by Last Menstrual Period  Gestational Age: 19w1d    No significant complications or exposures were reported in the current pregnancy.    Sailaja pradhan pregnancy history is significant for ***.    Medical History:   Sailaja pradhan reported medical history is not expected to impact pregnancy management or risks to fetal development.       Family History:   A three-generation pedigree was obtained, and is scanned under the  Media  tab.   The following significant findings were  reported by Sailaja:    ***    Otherwise, the reported family history is negative for multiple miscarriages, stillbirths, birth defects, intellectual disabilities, known genetic conditions, and consanguinity.       Carrier Screening:   {ancestry:178473085}    Expanded carrier screening for mutations in a large panel of genes associated with autosomal recessive conditions including cystic fibrosis, spinal muscular atrophy, and others, is now available.      {[facility] CARRIER CONDITIONS:278302096}       Risk Assessment for Chromosome Conditions:   We explained that the risk for fetal chromosome abnormalities increases with maternal age. We discussed specific features of common chromosome abnormalities, including Down syndrome, trisomy 13, trisomy 18, and sex chromosome trisomies.      {midtrimester / delivery:159119867}      Sailaja {GC Maternal Serum Did/Not:86858516}    First trimester screening    First trimester ultrasound with nuchal translucency and nasal bone assessments, maternal plasma hCG, JOHNNY-A, and AFP measurement    Screens for fetal trisomy 21, trisomy 13, and trisomy 18    Sailaja had a first trimester screen earlier in pregnancy; we reviewed the results today, which were {Screen Negative Screen Positive:407017814}    Screening values were as follows  o Nuchal translucency= *** mm, nasal bone present, hCG= *** MoM, JOHNNY-A= *** MoM, AFP= *** MoM    This screen gave the following risk assessments:  o Down syndrome risk= 1:***  o Trisomy 13/18 risk= 1:***  o MSAFP (after 15 weeks for open neural tube defect screening) {MSAFP Results:71658163}     Non-invasive Prenatal Testing (NIPT)    Maternal plasma cell-free DNA testing    Screens for fetal trisomy 21, trisomy 13, trisomy 18, and sex chromosome aneuploidy    First trimester ultrasound with { Nuchal Translucency:924952009}    Sailaja had a *** test earlier in pregnancy; we reviewed the results today, which are normal for {chromosome:561308899} (no aneuploidy  detected)    Given the accuracy of this test, these results greatly decrease the chance for certain fetal chromosome abnormalities    We discussed the limitations of normal NIPT results    MSAFP (after 15 weeks for open neural tube defect screening) {MSAFP Results:03797173}    Quad screen    Maternal plasma AFP, hCG, estriol, and inhibin measurement between 15-24 weeks gestation    Provides risk assessment for fetal Down syndrome, trisomy 18, and neural tube defects    Sailaja had a quad screen earlier in pregnancy; we reviewed the results today, which were {Screen Negative Screen Positive:337416161}    Chemical values were as follows    AFP *** MoM, hCG *** MoM, estriol *** MoM, and EVER *** MoM    This screen gave the following risk assessments:    Down syndrome risk= 1:***    Trisomy 18 risk= 1:***    Open neural tube defects risk= 1:***         Testing Options:   We discussed the following options:  { Testing Options3:12169931}    We reviewed the benefits and limitations of this testing.  Screening tests provide a risk assessment specific to the pregnancy for certain fetal chromosome abnormalities, but cannot definitively diagnose or exclude a fetal chromosome abnormality.  Follow-up genetic counseling and consideration of diagnostic testing is recommended with any abnormal screening result.     Diagnostic tests carry inherent risks- including risk of miscarriage- that require careful consideration.  These tests can detect fetal chromosome abnormalities with greater than 99% certainty.  Results can be compromised by maternal cell contamination or mosaicism, and are limited by the resolution of cytogenetic G-banding technology.  There is no screening nor diagnostic test that can detect all forms of birth defects or mental disability.    It was a pleasure to be involved with Sailaja s care. Face-to-face time of the meeting was *** minutes.      Nicole Sapp GC, [facility]  Certified Genetic Counselor  Pager:  [phone]

## 2020-05-15 NOTE — PROGRESS NOTES
[facility] [place]  Genetic Counseling Consult    Patient:  Sailaja Aguirre YOB: 1980   Date of Service:  5/14/20      Sailaja Aguirre was evaluated via a billable telephone visit at [facility] for genetic consultation as part of her appointment for comprehensive ultrasound.  The indication for genetic counseling is advanced maternal age. The patient was unaccompanied for today's telephone consult. Lee  Damon facilitated today's telephone consult.     The patient has been notified of following:    This telephone visit will be conducted via a call between you and your physician/provider. We have found that certain health care needs can be provided without the need for a physical exam. This service lets us provide the care you need with a short phone conversation. If a prescription is necessary we can send it directly to your pharmacy. If lab work is needed we can place an order for that and you can then stop by our lab to have the test done at a later time.     If during the course of the call the provider feels a telephone visit is not appropriate, you will not be charged for this service.       Impression/Plan:   1. Sailaja has elected to have her blood drawn for non-invasive prenatal testing (NIPT) at today's consult (Innatal test through [facility]). The patient has requested to have her blood drawn the day of her comprehensive ultrasound scheduled for May 20, 2020 at our [place] clinic location. We will have Sailaja's Innatal kit made and ready for her at our [place] outpatient lab after she completes her level II ultrasound on the 20th. The patient is aware that results take 7-10 days to come back and myself or one of my genetic counseling colleagues will contact the patient via telephone to disclose results once available. In the event we are unable to contact the patient once results become available, she  "has requested we leave a call back number at her mobile phone number that we have on file, alerting her that results are available for review with a genetic counselor. The patient wishes to know the predicted genetic sex of the pregnancy.    2. Sailaja is scheduled for a comprehensive (level II) ultrasound on May 20, 2020.  Please see the ultrasound report for further details.    Pregnancy History:   /Parity:     Age at Delivery: 40 year old  EZEQUIEL: 10/7/2020, by Last Menstrual Period  Gestational Age: 19w2d    No significant complications or exposures were reported in the current pregnancy.    Sailaja s pregnancy history is significant for one spontaneous first trimester miscarriage (7w3d) with a previous partner. The patient has two healthy daughters, both of whom were reported to have had uncomplicated pregnancies (term, ). The current pregnancy and the patient's youngest daughter share the same father, Paul.     Medical History:   Sailaja s reported medical history is not expected to impact pregnancy management or risks to fetal development.       Family History:   A three-generation pedigree was obtained, and is scanned under the  Media  tab.     The following significant findings were reported by Sailaja:    Father of the baby: Paul is 32 years old and in good health. He has no children with previous partners.     Patient's mother: she is reported to have had three miscarriages. Two of the miscarriages are believed to have resulted from her being \"careless\" and not realizing she was pregnant, one resulted because the \"baby did not have enough blood\". No genetic testing was performed on any of these products of conception.     Paul's niece: she is reported to have been diagnosed with an unspecified congenital heart defect. She  from complications of the heart defect when she was around 3-5 years old. The patient reports she also had mild speech delay. No other information about this niece is known " to the patient.     Otherwise, the reported family history is negative for multiple miscarriages, stillbirths, birth defects, intellectual disabilities, known genetic conditions, and consanguinity.    Congenital heart defects can be isolated or associated with multiple birth defects (syndromic).There are many genetic syndromes, single gene disorders or chromosomal abnormalities, that can be associated with congenital heart defects. Isolated congenital heart defects are usually a multi-factorial condition caused by the combination of genetic and environmental factors and familial clustering is not uncommon. Since there is a genetic component to multi-factorial conditions, the recurrence risk is higher for first degree relatives (siblings) than in second degree relatives and decreases with distance in relationship.    We discussed that congenital heart defects are common, occurring in 0.5 to 1.0% live births. The specific recurrence risk for first degree relatives differs according to the type of congenital heart defect and if there is an associated genetic syndrome present. In general, studies show that the overall risk contributed by a positive family history of a congenital heart defect in 1st degree relatives for the same defect is 8.15% whereas the recurrence risk for a different heart defect is 2.68%. There are also recurrence risks specific to the heart defect. Often risks for second degree relatives are not available. Furthermore, for third degree relatives the risk is likely equal to general population risks. The patient was encouraged to contact me or our clinic in the future should she learn more about Paul's niece's medical history. This way, if a specific genetic etiology is known, a more precise risk assessment for the patient could be provided.         Carrier Screening:   The patient reports that she and the father of the pregnancy have  ancestry:       Expanded carrier screening for  mutations in a large panel of genes associated with autosomal recessive conditions including cystic fibrosis, spinal muscular atrophy, and others, is now available.      Carrier screening was not discussed today.       Risk Assessment for Chromosome Conditions:   We explained that the risk for fetal chromosome abnormalities increases with maternal age. We discussed specific features of common chromosome abnormalities, including Down syndrome, trisomy 13, trisomy 18, and sex chromosome trisomies.      At age 40 at midtrimester, the risk to have a baby with Down syndrome is 1 in 98.     At age 40 at midtrimester, the risk to have a baby with any chromosome abnormality is 1 in 51.       Sailaja did not have maternal serum screening earlier in pregnancy.    Testing Options:   We discussed the following options:     Non-invasive Prenatal Testing (NIPT)    Maternal plasma cell-free DNA testing; first trimester ultrasound with nuchal translucency and nasal bone assessment is recommended, when appropriate    Screens for fetal trisomy 21, trisomy 13, trisomy 18, and sex chromosome aneuploidy    Cannot screen for open neural tube defects; maternal serum AFP after 15 weeks is recommended     Genetic Amniocentesis    Invasive procedure typically performed in the second trimester by which amniotic fluid is obtained for the purpose of chromosome analysis and/or other prenatal genetic analysis    Diagnostic results; >99% sensitivity for fetal chromosome abnormalities    AFAFP measurement tests for open neural tube defects     Comprehensive (Level II) ultrasound: Detailed ultrasound performed between 18-22 weeks gestation to screen for major birth defects and markers for aneuploidy.      We reviewed the benefits and limitations of this testing.  Screening tests provide a risk assessment specific to the pregnancy for certain fetal chromosome abnormalities, but cannot definitively diagnose or exclude a fetal chromosome abnormality.   Follow-up genetic counseling and consideration of diagnostic testing is recommended with any abnormal screening result.     Diagnostic tests carry inherent risks- including risk of miscarriage- that require careful consideration.  These tests can detect fetal chromosome abnormalities with greater than 99% certainty.  Results can be compromised by maternal cell contamination or mosaicism, and are limited by the resolution of cytogenetic G-banding technology.  There is no screening nor diagnostic test that can detect all forms of birth defects or mental disability.    It was a pleasure to be involved with Sailaja s care.     Total telephone call contact time  Call started at 1:45PM  Call ended at 2:45PM    Nicole Sapp MS, [facility]  Genetic Counselor  [facility]  Maternal Fetal Medicine  [email or web address]  Ph: [phone]  Pager: [phone]

## 2020-05-19 ENCOUNTER — PRE VISIT (OUTPATIENT)
Dept: MATERNAL FETAL MEDICINE | Facility: CLINIC | Age: 40
End: 2020-05-19

## 2020-05-20 ENCOUNTER — OFFICE VISIT (OUTPATIENT)
Dept: MATERNAL FETAL MEDICINE | Facility: CLINIC | Age: 40
End: 2020-05-20
Attending: OBSTETRICS & GYNECOLOGY
Payer: COMMERCIAL

## 2020-05-20 ENCOUNTER — HOSPITAL ENCOUNTER (OUTPATIENT)
Dept: ULTRASOUND IMAGING | Facility: CLINIC | Age: 40
End: 2020-05-20
Attending: OBSTETRICS & GYNECOLOGY
Payer: COMMERCIAL

## 2020-05-20 DIAGNOSIS — O26.90 PREGNANCY RELATED CONDITION, ANTEPARTUM: ICD-10-CM

## 2020-05-20 DIAGNOSIS — O09.529 AMA (ADVANCED MATERNAL AGE) MULTIGRAVIDA 35+: ICD-10-CM

## 2020-05-20 DIAGNOSIS — O44.00 PLACENTA PREVIA ANTEPARTUM: Primary | ICD-10-CM

## 2020-05-20 DIAGNOSIS — Z36.9 ENCOUNTER FOR ANTENATAL SCREENING OF MOTHER: ICD-10-CM

## 2020-05-20 DIAGNOSIS — O09.522 MULTIGRAVIDA OF ADVANCED MATERNAL AGE IN SECOND TRIMESTER: ICD-10-CM

## 2020-05-20 PROCEDURE — 40000791 ZZHCL STATISTIC VERIFI PRENATAL TRISOMY 21,18,13: Performed by: OBSTETRICS & GYNECOLOGY

## 2020-05-20 PROCEDURE — 76811 OB US DETAILED SNGL FETUS: CPT

## 2020-05-20 PROCEDURE — 36415 COLL VENOUS BLD VENIPUNCTURE: CPT | Performed by: OBSTETRICS & GYNECOLOGY

## 2020-05-20 NOTE — PROGRESS NOTES
"Please see \"Imaging\" tab under \"Chart Review\" for details of today's US.    Charlene Mcmahan, DO    "

## 2020-05-27 PROBLEM — O44.00 PLACENTA PREVIA: Status: ACTIVE | Noted: 2020-05-27

## 2020-05-29 ENCOUNTER — TELEPHONE (OUTPATIENT)
Dept: MATERNAL FETAL MEDICINE | Facility: CLINIC | Age: 40
End: 2020-05-29

## 2020-05-29 ENCOUNTER — APPOINTMENT (OUTPATIENT)
Dept: INTERPRETER SERVICES | Facility: CLINIC | Age: 40
End: 2020-05-29
Payer: COMMERCIAL

## 2020-05-29 LAB — LAB SCANNED RESULT: NORMAL

## 2020-05-29 NOTE — TELEPHONE ENCOUNTER
May 29, 2020    I spoke with Sailaja regarding her NIPT results with the help of a [place] . (Of note: at the end of today's telephone call, the patient elected to forego  services moving forward)    Innatal screening results indicate NO ANEUPLOIDY DETECTED for chromosomes 21, 18, 13, or sex chromosomes (XX- female). The predicted genetic sex was shared with the patient over the phone today.    These results put the patient's current pregnancy at low risk for Down syndrome, trisomy 18, trisomy 13 and sex chromosome abnormalities.This test is reported to have the following sensitivities: Down syndrome- 99%, trisomy 18- 98%, and trisomy 13- 98%. Although these results are reassuring, this does not replace a standard chromosome analysis from a chorionic villus sampling or amniocentesis. MSAFP is the appropriate second trimester screening test for open neural tube defects; the maternal quad screen is not recommended.     Her results are available in her Epic chart for review and will be forwarded to her primary OB.    The patient requested getting a belt to help support her stomach while she is at work, as the patient is usually on her feet. 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        Nicole Sapp MS, [facility]  Genetic Counselor  [facility]  Maternal Fetal Medicine  Ph: [phone]  [email or web address]

## 2020-05-31 PROBLEM — O09.529 AMA (ADVANCED MATERNAL AGE) MULTIGRAVIDA 35+: Status: ACTIVE | Noted: 2020-05-31

## 2020-07-01 ENCOUNTER — OFFICE VISIT (OUTPATIENT)
Dept: MATERNAL FETAL MEDICINE | Facility: CLINIC | Age: 40
End: 2020-07-01
Attending: OBSTETRICS & GYNECOLOGY
Payer: COMMERCIAL

## 2020-07-01 ENCOUNTER — PRENATAL OFFICE VISIT (OUTPATIENT)
Dept: OBGYN | Facility: CLINIC | Age: 40
End: 2020-07-01
Payer: COMMERCIAL

## 2020-07-01 ENCOUNTER — HOSPITAL ENCOUNTER (OUTPATIENT)
Dept: ULTRASOUND IMAGING | Facility: CLINIC | Age: 40
End: 2020-07-01
Attending: OBSTETRICS & GYNECOLOGY
Payer: COMMERCIAL

## 2020-07-01 VITALS
DIASTOLIC BLOOD PRESSURE: 69 MMHG | TEMPERATURE: 97.1 F | HEART RATE: 75 BPM | BODY MASS INDEX: 28.12 KG/M2 | WEIGHT: 144 LBS | SYSTOLIC BLOOD PRESSURE: 110 MMHG

## 2020-07-01 DIAGNOSIS — O44.00 PLACENTA PREVIA ANTEPARTUM: ICD-10-CM

## 2020-07-01 DIAGNOSIS — O09.522 MULTIGRAVIDA OF ADVANCED MATERNAL AGE IN SECOND TRIMESTER: ICD-10-CM

## 2020-07-01 DIAGNOSIS — O44.02 PLACENTA PREVIA IN SECOND TRIMESTER: ICD-10-CM

## 2020-07-01 DIAGNOSIS — Z34.80 SUPERVISION OF OTHER NORMAL PREGNANCY, ANTEPARTUM: Primary | ICD-10-CM

## 2020-07-01 DIAGNOSIS — O99.810 ABNORMAL MATERNAL GLUCOSE TOLERANCE, ANTEPARTUM: ICD-10-CM

## 2020-07-01 DIAGNOSIS — O44.00 PLACENTA PREVIA ANTEPARTUM: Primary | ICD-10-CM

## 2020-07-01 DIAGNOSIS — O44.02 PLACENTA PREVIA ANTEPARTUM IN SECOND TRIMESTER: ICD-10-CM

## 2020-07-01 DIAGNOSIS — O09.522 MULTIGRAVIDA OF ADVANCED MATERNAL AGE IN SECOND TRIMESTER: Primary | ICD-10-CM

## 2020-07-01 LAB
CAPILLARY BLOOD COLLECTION: NORMAL
GLUCOSE 1H P 50 G GLC PO SERPL-MCNC: 141 MG/DL (ref 60–129)
HGB BLD-MCNC: 12 G/DL (ref 11.7–15.7)

## 2020-07-01 PROCEDURE — 36416 COLLJ CAPILLARY BLOOD SPEC: CPT | Performed by: OBSTETRICS & GYNECOLOGY

## 2020-07-01 PROCEDURE — 82950 GLUCOSE TEST: CPT | Performed by: OBSTETRICS & GYNECOLOGY

## 2020-07-01 PROCEDURE — 00000218 ZZHCL STATISTIC OBHBG - HEMOGLOBIN: Performed by: OBSTETRICS & GYNECOLOGY

## 2020-07-01 PROCEDURE — 99207 ZZC PRENATAL VISIT: CPT | Performed by: OBSTETRICS & GYNECOLOGY

## 2020-07-01 PROCEDURE — 76816 OB US FOLLOW-UP PER FETUS: CPT

## 2020-07-01 NOTE — PROGRESS NOTES
26w0d  US today shows persistent posterior placenta previa. There is a placental lake on the inferior edge of the placenta reaching to the internal os.   Reviewed pelvic rest, no vigorous exercise or heavy lifting. Patient instructed to call if any bleeding.  Discussed  section if persistent previa. Patient wants to wait until next month's US to schedule CS.  Reviewed procedure, recovery, timing (37w, sooner if significant bleeding).      Active fetal movement. No contractions, no leaking, no bleeding.   Reviewed weight gain, on track.    1h gct: 141 - needs 3h gtt,  Hgb 12.   Childbirth classes? No  Plan on breastfeeding? Yes  Birthcontrol? IUD came out in the past, used patch but didn't like it. Plans to use POPs.  Sex on ultrasound? girl  Circumsion? NA  Peds doc? Undecided    RTC 4 weeks.   Laquita Ruiz MD

## 2020-07-01 NOTE — PATIENT INSTRUCTIONS
[facility]  Professional Building  [address]  [place], [place] [address] [phone]

## 2020-07-01 NOTE — PROGRESS NOTES
"Please see \"Imaging\" tab under Chart Review for full details.    Chanel Garcia MD  Maternal Fetal Medicine    "

## 2020-07-02 ENCOUNTER — PATIENT OUTREACH (OUTPATIENT)
Dept: CARE COORDINATION | Facility: CLINIC | Age: 40
End: 2020-07-02

## 2020-07-02 ENCOUNTER — APPOINTMENT (OUTPATIENT)
Dept: INTERPRETER SERVICES | Facility: CLINIC | Age: 40
End: 2020-07-02
Payer: COMMERCIAL

## 2020-07-02 NOTE — PROGRESS NOTES
Clinic Care Coordination Contact  [facility]/Voicemail    Clinical Data: Care Coordinator Outreach  Outreach attempted x 1.  Left message on patient's voicemail with call back information and requested return call.  Plan: Care Coordinator will try to reach patient again in 3-5 business days.

## 2020-07-02 NOTE — PROGRESS NOTES
Called pt with . Pt is at work right now and asked if she can call back later. Left my name and contact information with pt and asked her to call me when she can. Let pt know I am out of the office tomorrow and Monday for the holiday weekend.      Reason for Referral: Financial Support: baby supplies, transportation options  Additional pertinent details: Patient is pregnant, interested in resources for baby supplies like car seat and transportation  Clinical Staff have discussed the Care Coordination Referral with the patient and/or caregiver: yes

## 2020-07-03 NOTE — PROGRESS NOTES
Pt was scheduled to do 3hr gtt on 7/29, called pt to get her scheduled earlier.  Pt unable to come during the week due to employer so she will go to outpatient lab at the hospital.  Discussed with the lab that pt should check in at [facility] and then they can do the test on the weekend.  Pt will need to stay in the lobby between lab draws.  Information was given to the patient and she will plan on going to the lab on 7/11/20 for her test.  Estela Minor RN

## 2020-07-08 NOTE — PROGRESS NOTES
Clinic Care Coordination Contact  Community Health Worker Initial Outreach    CHW Initial Information Gathering:  Preferred Hospital: [facility]  [phone]  Current living arrangement:: I live in a private home with family  Supplies used at home:: None  Equipment Currently Used at Home: none  Informal Support system:: Children, Family, Spouse  Transportation means:: Public transportation  CHW Additional Questions  If ED/Hospital discharge, follow-up appointment scheduled as recommended?: N/A  Medication changes made following ED/Hospital discharge?: N/A  MyChart active?: No    Patient accepts CC: Yes. Patient scheduled for assessment with CC BIBI Hector on Thurday, July 9th at 2 pm by phone. Patient noted desire to discuss resources for baby.     Routing to OB/GYN and CC BIBI.

## 2020-07-09 ENCOUNTER — HOSPITAL ENCOUNTER (INPATIENT)
Facility: CLINIC | Age: 40
Setting detail: SURGERY ADMIT
End: 2020-07-09
Attending: OBSTETRICS & GYNECOLOGY | Admitting: OBSTETRICS & GYNECOLOGY
Payer: COMMERCIAL

## 2020-07-09 ENCOUNTER — TELEPHONE (OUTPATIENT)
Dept: OBGYN | Facility: CLINIC | Age: 40
End: 2020-07-09

## 2020-07-09 ENCOUNTER — HOSPITAL ENCOUNTER (OUTPATIENT)
Facility: CLINIC | Age: 40
Discharge: HOME | End: 2020-07-09
Attending: OBSTETRICS & GYNECOLOGY | Admitting: OBSTETRICS & GYNECOLOGY
Payer: COMMERCIAL

## 2020-07-09 VITALS — SYSTOLIC BLOOD PRESSURE: 109 MMHG | RESPIRATION RATE: 18 BRPM | TEMPERATURE: 98.5 F | DIASTOLIC BLOOD PRESSURE: 70 MMHG

## 2020-07-09 DIAGNOSIS — O44.03 PLACENTA PREVIA IN THIRD TRIMESTER: ICD-10-CM

## 2020-07-09 PROBLEM — Z36.89 ENCOUNTER FOR TRIAGE IN PREGNANT PATIENT: Status: ACTIVE | Noted: 2020-07-09

## 2020-07-09 LAB
ALBUMIN UR-MCNC: NEGATIVE MG/DL
APPEARANCE UR: CLEAR
BACTERIA #/AREA URNS HPF: ABNORMAL /HPF
BILIRUB UR QL STRIP: NEGATIVE
COLOR UR AUTO: ABNORMAL
GLUCOSE UR STRIP-MCNC: NEGATIVE MG/DL
HGB UR QL STRIP: NEGATIVE
KETONES UR STRIP-MCNC: NEGATIVE MG/DL
LEUKOCYTE ESTERASE UR QL STRIP: NEGATIVE
MUCOUS THREADS #/AREA URNS LPF: PRESENT /LPF
NITRATE UR QL: NEGATIVE
PH UR STRIP: 7 PH (ref 5–7)
RBC #/AREA URNS AUTO: <1 /HPF (ref 0–2)
SOURCE: ABNORMAL
SP GR UR STRIP: 1.01 (ref 1–1.03)
SQUAMOUS #/AREA URNS AUTO: 3 /HPF (ref 0–1)
UROBILINOGEN UR STRIP-MCNC: NORMAL MG/DL (ref 0–2)
WBC #/AREA URNS AUTO: <1 /HPF (ref 0–5)

## 2020-07-09 PROCEDURE — 99213 OFFICE O/P EST LOW 20 MIN: CPT | Mod: GC | Performed by: OBSTETRICS & GYNECOLOGY

## 2020-07-09 PROCEDURE — G0463 HOSPITAL OUTPT CLINIC VISIT: HCPCS

## 2020-07-09 PROCEDURE — 81001 URINALYSIS AUTO W/SCOPE: CPT | Performed by: OBSTETRICS & GYNECOLOGY

## 2020-07-09 NOTE — TELEPHONE ENCOUNTER
Patient is 27w1d, c/o bleeding. Pt has persistent posterior placenta previa. This morning when woke up at 4am had some red bleeding when wiping. Not a lot of bleeding, like spotting on the toilet paper. Went to the bathroom when she woke up again to take a shower and had a couple more drops. No pain or cramping. No heavy lifting or vigorous exercise. No bleeding since 6:30am this morning. Pt states that she went to the park yesterday with her other children and threw a ball around. Afraid to drink fluids to avoid going to the bathroom. Explained that she should still drink fluids to prevent dehydration as has nothing to do with the bleeding. Routing to provider. Ok to monitor since very little? Or do you want her to come in to L&D for eval? Thanks.   Vania Miranda, RN-BSN

## 2020-07-09 NOTE — LETTER
RETURN TO Work FORM    7/9/2020    Re: Sailaja Aguirre  1980      To Whom It May Concern:     Sailaja Aguirre was evaluated today.  She is allowed to return to work with restrictions.  She cannot lift >10-15 lbs.  Please should be sitting during her shift.  Please allow 15 minute break every 3 hours where she is able to elevate her feet.  She should have access to water at her work station at all times.       Lauryn Ruead MD    7/9/2020 12:40 PM

## 2020-07-09 NOTE — PROVIDER NOTIFICATION
20 1058   Provider Notification   Provider Name/Title Dr. Grier   Method of Notification In Department   Request Evaluate in Person   Notification Reason Patient Arrived     Data: [facility] to [facility] at 1012.   Reason for maternal/fetal assessment per patient is Vaginal Bleeding  .  Patient is a . Prenatal record reviewed.      OB History    Para Term  AB Living   4 2 2 0 1 2   SAB TAB Ectopic Multiple Live Births   0 0 0 0 2      # Outcome Date GA Lbr Ronnie/2nd Weight Sex Delivery Anes PTL Lv   4 Current            3 AB 09/15/17 7w3d    AB, MISSED      2 Term 13 40w0d  3.402 kg (7 lb 8 oz) F  None N SHANTHI   1 Term 06 40w0d  2.892 kg (6 lb 6 oz) F  EPI N SHANTHI   . Medical history:   Past Medical History:   Diagnosis Date     Abnormal Pap smear of cervix 2020    see problem list   . Gestational Age 27w1d. VSS. Fetal movement present. Patient denies cramping, backache, pelvic pressure, UTI symptoms, GI problems, edema, headache, visual disturbances, epigastric or URQ pain, abdominal pain, rupture of membranes. Patient reports scant bright red vaginal spotting at 0400 and again at 0600 this am.  Patient also reports mild aching in her vagina/cervix area for past 3 days.  Denies having vaginal intercourse prior to onset of pain or spotting. Support person Paul present.  Action: Verbal consent for EFM. Triage assessment completed. EFM applied for fetal assessment. Uterine assessment shows no uc's. Fetal assessment: Presumed adequate fetal oxygenation documented (see flow record).   Response: Dr. Malik informed of above information. MD came to bedside, reviewing fetal and uterine tracing and MD performing SSE: no bleeding present, and cervix visually closed.  Plan per MD to discuss with Dr. Rueda. Patient verbalized agreement with plan.

## 2020-07-09 NOTE — PROGRESS NOTES
L&D Triage Progress Note     HPI: Sailaja Aguirre is a 40 year old  at 27w1d by LMP and 13w US, here for vaginal bleeding. The patient reports 2 episodes this morning of small amounts of blood on the toilet paper after voiding (once at 0400 and once at 0600), but none in the bowl.  She has gone to the bathroom since then multiple times with no further bleeding.  No vaginal pain, no discharge, no abdominal pain, normal fetal movement. She has been active at home and work over the past few days and spent much of the [place] the park. She denies trauma or recent intercourse.   Pt has had an intermittent headache in the past several days which resolves spontaneously.    She denies fever, chills, scotoma, CP, SOB, nausea, vomiting,  RUQ pain, constipation, diarrhea, dysuria, and acute swelling.     Pregnancy notable for:  - AMA, NIPT low risk  - ASCUS  - Placenta previa on US with placental lake extending to the internal os    Lab Results   Component Value Date    ABO O 2020    RH Pos 2020    AS Neg 2020    HEPBANG Nonreactive 2020    TREPAB Negative 2017    HGB 12.0 2020     GBS Status:   No results found for: GBS        OBHX:  OB History    Para Term  AB Living   4 2 2 0 1 2   SAB TAB Ectopic Multiple Live Births   0 0 0 0 2      # Outcome Date GA Lbr Ronnie/2nd Weight Sex Delivery Anes PTL Lv   4 Current            3 AB 09/15/17 7w3d    AB, MISSED      2 Term 13 40w0d  3.402 kg (7 lb 8 oz) F  None N SHANTHI   1 Term 06 40w0d  2.892 kg (6 lb 6 oz) F  EPI N SHANTHI     MedicalHX:  Past Medical History:   Diagnosis Date     Abnormal Pap smear of cervix 2020    see problem list     SurgicalHX:  Past Surgical History:   Procedure Laterality Date     DILATION AND CURETTAGE SUCTION N/A 9/15/2017    Procedure: DILATION AND CURETTAGE SUCTION;  Suction Dilation and Curettage ;  Surgeon: Gris Huerta MD;  Location: [facility] OR     HERNIA REPAIR       AGE 8 YEARS OLD     Medications:  No current facility-administered medications on file prior to encounter.   Prenatal Vit-Fe Fumarate-FA (PRENATAL MULTIVITAMIN W/IRON) 27-0.8 MG tablet, Take 1 tablet by mouth daily  hydroquinone (CLOTILDE) 4 % external cream, Apply topically to the entire face BID for up to 12 weeks, then discontinue (Patient not taking: Reported on 3/30/2020)  ibuprofen (ADVIL/MOTRIN) 600 MG tablet, Take 1 tablet (600 mg) by mouth every 6 hours as needed for moderate pain (Patient not taking: Reported on 3/30/2020)  Prenatal Vit-Fe Fumarate-FA (PRENATAL VITAMINS) 28-0.8 MG TABS, Take 1 Dose by mouth daily (Patient not taking: Reported on 4/30/2020)  senna (SENOKOT) 8.6 MG tablet, Take 2 tablets by mouth 2 times daily as needed for constipation (Patient not taking: Reported on 3/30/2020)    Allergies:  No Known Allergies    FamilyHX:      Family History   Problem Relation Age of Onset     Family History Negative Mother      SocialHX:  Social History     Socioeconomic History     Marital status: Single     Spouse name: None     Number of children: None     Years of education: None     Highest education level: None   Occupational History     None   Social Needs     Financial resource strain: None     Food insecurity     Worry: None     Inability: None     Transportation needs     Medical: None     Non-medical: None   Tobacco Use     Smoking status: Never Smoker     Smokeless tobacco: Never Used   Substance and Sexual Activity     Alcohol use: No     Drug use: No     Sexual activity: Yes     Partners: Male   Lifestyle     Physical activity     Days per week: None     Minutes per session: None     Stress: None   Relationships     Social connections     Talks on phone: None     Gets together: None     Attends Confucianism service: None     Active member of club or organization: None     Attends meetings of clubs or organizations: None     Relationship status: None     Intimate partner violence     Fear of  current or ex partner: None     Emotionally abused: None     Physically abused: None     Forced sexual activity: None   Other Topics Concern     None   Social History Narrative     None     ROS: 10-point ROS negative except as in HPI.    Physical Exam:  Vitals:    20 1025   BP: 109/70   Resp: 18   Temp: 98.5  F (36.9  C)   TempSrc: Oral     GEN: resting comfortably in bed, NAD   CV: Regular rate, well perfused  PULM: On room air, no increased work of breathing  ABD: soft, gravid, non-tender, non-distended  EXT: trace edema, not tender to palpation  SSE: Cervix visually closed. Moderate amount of white mucus discharge. No evidence of blood in vagina. No blood at or from cervix. No polyp. No ectropion.     NST:  FHT: baseline 145, moderate variability, 10x10 accels, no decels  TOCO: 0 ctx in ten minutes    A/P: 40 year old  at 27w1d by LMP c/w early US, here for vaginal bleed. Pregnancy notable for placenta previa/placental lake, AMA, ASCUS, normal NIPTx2 and failed GCT, GTT pending.  UA negativeto for UTI    Rule out UTI  - UA without evidence of infection. Negative leukesterase and nitrites    Vaginal bleeding: differential includes bleeding from placenta previa/placental lake, UTI, hemorrhoid, or, less likely placental abruption.  Most likely is that small amount of bleeding from previa.  UA without evidence of infection. Abruption unlikely given lack of pain, no contractions, and lack of ongoing bleeding. No evidence of bleeding hemorrhoid, patient certain bleeding was vaginal in origin. Discussed return precautions discussed with patient who expresses understanding.     Fetal Well-Being  - FHT: Category 1. Appropriate for gestational age.     Dispo: Discharge to home with return instructions    Patient discussed with Dr. Lauryn Malik MD  Obstetrics and Gynecology, PGY-2  20. 2:12 PM    Physician Attestation   I, Lauryn Rueda MD, personally examined and evaluated this  patient.  I discussed the patient with the resident/fellow and care team, and agree with the assessment and plan of care as documented in the note of 20.      I personally reviewed vital signs, medications, labs and imaging.    Peters findings: Sailaja Aguirre is a 40 year old  at 27w1d by LMP c/w 13w0d us who presents for evaluation of vaginal bleeding in pregnancy with known placenta previa/placental lake.  Patient describes vaginal bleeding as a bright reds small smear on the toilet paper with wiping at 0400 and again at 0600 this morning.  She has had no further bleeding.  She denies any cramping or contractions.  She reports normal fetal movement and denies any leakage of fluid.  On exam no blood in vaginal or coming from cervix.  FHT Category 1, reactive and reassuring.  Patient's bleeding most likely due to placenta previa, but initial amount per description is very small and is now resolved for >6 hours.  Fetal well being reassuring.  Reviewed patient's presentation with M Dr. Ferrara who does not recommend BMZ course at this time.  Patient given strict bleeding and return precautions.  Next clinic visit scheduled for 2020.  Lauryn Rueda MD  Date of Service (when I saw the patient): 20

## 2020-07-09 NOTE — DISCHARGE INSTRUCTIONS
Discharge Instruction for Undelivered Patients      You were seen for: Bleeding Assessment  We Consulted: Dr. Prasad  You had (Test or Medicine): none     Diet:   Drink 8 to 12 glasses of liquids (milk, juice, water) every day.  You may eat meals and snacks.     Activity:  Call your doctor or nurse midwife if your baby is moving less than usual.     Call your provider if you notice:  Swelling in your face or increased swelling in your hands or legs.  Headaches that are not relieved by Tylenol (acetaminophen).  Changes in your vision (blurring: seeing spots or stars.)  Nausea (sick to your stomach) and vomiting (throwing up).   Weight gain of 5 pounds or more per week.  Heartburn that doesn't go away.  Signs of bladder infection: pain when you urinate (use the toilet), need to go more often and more urgently.  The bag of rios (rupture of membranes) breaks, or you notice leaking in your underwear.  Bright red blood in your underwear.  Abdominal (lower belly) or stomach pain.  For first baby: Contractions (tightening) less than 5 minutes apart for one hour or more.  Second (plus) baby: Contractions (tightening) less than 10 minutes apart and getting stronger.  *If less than 34 weeks: Contractions (tightenings) more than 6 times in one hour.  Increase or change in vaginal discharge (note the color and amount)  Other:     Follow-up:  As scheduled in the clinic

## 2020-07-09 NOTE — PROVIDER NOTIFICATION
07/09/20 1200   Provider Notification   Provider Name/Title Dr. Meri Rueda   Method of Notification At Bedside   Request Evaluate in Person   Notification Reason Status Update   MD at bedside reviewing EFM and toco tracing, plan per MD to send UA/UC and await UA result.

## 2020-07-29 ENCOUNTER — HOSPITAL ENCOUNTER (OUTPATIENT)
Dept: ULTRASOUND IMAGING | Facility: CLINIC | Age: 40
End: 2020-07-29
Attending: OBSTETRICS & GYNECOLOGY
Payer: COMMERCIAL

## 2020-07-29 ENCOUNTER — PRENATAL OFFICE VISIT (OUTPATIENT)
Dept: OBGYN | Facility: CLINIC | Age: 40
End: 2020-07-29
Payer: COMMERCIAL

## 2020-07-29 ENCOUNTER — TELEPHONE (OUTPATIENT)
Dept: OBGYN | Facility: CLINIC | Age: 40
End: 2020-07-29

## 2020-07-29 ENCOUNTER — OFFICE VISIT (OUTPATIENT)
Dept: MATERNAL FETAL MEDICINE | Facility: CLINIC | Age: 40
End: 2020-07-29
Attending: OBSTETRICS & GYNECOLOGY
Payer: COMMERCIAL

## 2020-07-29 VITALS
SYSTOLIC BLOOD PRESSURE: 100 MMHG | BODY MASS INDEX: 28.65 KG/M2 | HEART RATE: 78 BPM | DIASTOLIC BLOOD PRESSURE: 68 MMHG | WEIGHT: 146.7 LBS

## 2020-07-29 DIAGNOSIS — O44.00 PLACENTA PREVIA ANTEPARTUM: ICD-10-CM

## 2020-07-29 DIAGNOSIS — O40.9XX0 POLYHYDRAMNIOS AFFECTING PREGNANCY: ICD-10-CM

## 2020-07-29 DIAGNOSIS — Z23 NEED FOR TDAP VACCINATION: ICD-10-CM

## 2020-07-29 DIAGNOSIS — O44.03 PLACENTA PREVIA IN THIRD TRIMESTER: ICD-10-CM

## 2020-07-29 DIAGNOSIS — O09.523 ENCOUNTER FOR SUPERVISION OF MULTIGRAVIDA OF ADVANCED MATERNAL AGE IN THIRD TRIMESTER: Primary | ICD-10-CM

## 2020-07-29 DIAGNOSIS — O09.522 MULTIGRAVIDA OF ADVANCED MATERNAL AGE IN SECOND TRIMESTER: ICD-10-CM

## 2020-07-29 DIAGNOSIS — O44.00 PLACENTA PREVIA ANTEPARTUM: Primary | ICD-10-CM

## 2020-07-29 LAB
GLUCOSE 1H P 100 G GLC PO SERPL-MCNC: 94 MG/DL (ref 60–179)
GLUCOSE 2H P 100 G GLC PO SERPL-MCNC: 142 MG/DL (ref 60–154)
GLUCOSE 3H P 100 G GLC PO SERPL-MCNC: 103 MG/DL (ref 60–139)
GLUCOSE P FAST SERPL-MCNC: 79 MG/DL (ref 60–94)

## 2020-07-29 PROCEDURE — 82951 GLUCOSE TOLERANCE TEST (GTT): CPT | Performed by: OBSTETRICS & GYNECOLOGY

## 2020-07-29 PROCEDURE — 90471 IMMUNIZATION ADMIN: CPT | Performed by: OBSTETRICS & GYNECOLOGY

## 2020-07-29 PROCEDURE — 36415 COLL VENOUS BLD VENIPUNCTURE: CPT | Performed by: OBSTETRICS & GYNECOLOGY

## 2020-07-29 PROCEDURE — 90715 TDAP VACCINE 7 YRS/> IM: CPT | Performed by: OBSTETRICS & GYNECOLOGY

## 2020-07-29 PROCEDURE — 99207 ZZC PRENATAL VISIT: CPT | Performed by: OBSTETRICS & GYNECOLOGY

## 2020-07-29 PROCEDURE — 82952 GTT-ADDED SAMPLES: CPT | Performed by: OBSTETRICS & GYNECOLOGY

## 2020-07-29 PROCEDURE — 76816 OB US FOLLOW-UP PER FETUS: CPT

## 2020-07-29 PROCEDURE — 76817 TRANSVAGINAL US OBSTETRIC: CPT | Performed by: OBSTETRICS & GYNECOLOGY

## 2020-07-29 RX ORDER — SENNOSIDES A AND B 8.6 MG/1
2 TABLET, FILM COATED ORAL 2 TIMES DAILY PRN
Qty: 120 TABLET | Refills: 0 | Status: SHIPPED | OUTPATIENT
Start: 2020-07-29

## 2020-07-29 RX ORDER — PRENATAL VIT/IRON FUM/FOLIC AC 27MG-0.8MG
1 TABLET ORAL DAILY
Qty: 90 TABLET | Refills: 3 | Status: SHIPPED | OUTPATIENT
Start: 2020-07-29 | End: 2020-12-16

## 2020-07-29 NOTE — PROGRESS NOTES
30w0d feeling ok, no c/o.  Good fm.  She had f/u ultrasound today with [facility], no report yet but tells me the previa remains and she is scheduled for f/u at 34 wks.  She denies any bleeding or spotting.  We discussed likely need for  at 36-37 wks and will schedule as we can always cancel.  She is in agreement, would like Dr. Ruiz to do surgery if possible.    3hr GTT today.  Tdap given.  Plan phone visit in 2 weeks, in office in 4 with MIL us.  elvira

## 2020-07-30 DIAGNOSIS — Z11.59 ENCOUNTER FOR SCREENING FOR OTHER VIRAL DISEASES: Primary | ICD-10-CM

## 2020-07-30 PROBLEM — O44.03 PLACENTA PREVIA IN THIRD TRIMESTER: Status: ACTIVE | Noted: 2020-07-09

## 2020-07-30 NOTE — TELEPHONE ENCOUNTER
Type of surgery: ob  Location of surgery: [facility]  Date and time of surgery: 9/18/20 830a  Surgeon: Joseph  Pre-Op Appt Date: surgeon to do  Post-Op Appt Date: 6 weeks   Packet sent out: Yes  Pre-cert/Authorization completed:  No  Date: 07/30/20  Vivian Clinton, Surgery Coordinator

## 2020-07-31 NOTE — PROGRESS NOTES
Please see full imaging report from [facility] under Imaging tab.    Julia Jiang MD  Maternal-Fetal Medicine

## 2020-08-10 ENCOUNTER — OFFICE VISIT (OUTPATIENT)
Dept: MATERNAL FETAL MEDICINE | Facility: CLINIC | Age: 40
End: 2020-08-10
Attending: OBSTETRICS & GYNECOLOGY
Payer: COMMERCIAL

## 2020-08-10 ENCOUNTER — PRENATAL OFFICE VISIT (OUTPATIENT)
Dept: OBGYN | Facility: CLINIC | Age: 40
End: 2020-08-10
Payer: COMMERCIAL

## 2020-08-10 ENCOUNTER — HOSPITAL ENCOUNTER (OUTPATIENT)
Dept: ULTRASOUND IMAGING | Facility: CLINIC | Age: 40
End: 2020-08-10
Attending: OBSTETRICS & GYNECOLOGY
Payer: COMMERCIAL

## 2020-08-10 DIAGNOSIS — O40.9XX0 POLYHYDRAMNIOS AFFECTING PREGNANCY: Primary | ICD-10-CM

## 2020-08-10 DIAGNOSIS — Z34.80 SUPERVISION OF OTHER NORMAL PREGNANCY, ANTEPARTUM: Primary | ICD-10-CM

## 2020-08-10 DIAGNOSIS — O40.9XX0 POLYHYDRAMNIOS AFFECTING PREGNANCY: ICD-10-CM

## 2020-08-10 PROCEDURE — 76815 OB US LIMITED FETUS(S): CPT

## 2020-08-10 PROCEDURE — 99207 ZZC PRENATAL VISIT: CPT | Performed by: OBSTETRICS & GYNECOLOGY

## 2020-08-10 NOTE — PROGRESS NOTES
Phone visit for modified prenatal care for COVID. Call time 10 minutes.  [facility] ultrasound 7/29 showed persistent posterior marginal previa, 4 week repeat is scheduled.  Mild polyhydramnios seen, 2 week repeat scheduled today.  3 hour GTT showed all normal values.  Next visit in office in 2 weeks.  AM

## 2020-08-18 RX ORDER — CITRIC ACID/SODIUM CITRATE 334-500MG
30 SOLUTION, ORAL ORAL
Status: CANCELLED | OUTPATIENT
Start: 2020-09-18

## 2020-08-18 RX ORDER — SODIUM CHLORIDE, SODIUM LACTATE, POTASSIUM CHLORIDE, CALCIUM CHLORIDE 600; 310; 30; 20 MG/100ML; MG/100ML; MG/100ML; MG/100ML
INJECTION, SOLUTION INTRAVENOUS CONTINUOUS
Status: CANCELLED | OUTPATIENT
Start: 2020-09-18

## 2020-08-18 RX ORDER — CEFAZOLIN SODIUM 1 G/3ML
1 INJECTION, POWDER, FOR SOLUTION INTRAMUSCULAR; INTRAVENOUS SEE ADMIN INSTRUCTIONS
Status: CANCELLED | OUTPATIENT
Start: 2020-09-18

## 2020-08-18 RX ORDER — CEFAZOLIN SODIUM 2 G/100ML
2 INJECTION, SOLUTION INTRAVENOUS
Status: CANCELLED | OUTPATIENT
Start: 2020-09-18

## 2020-08-21 ENCOUNTER — PATIENT OUTREACH (OUTPATIENT)
Dept: CARE COORDINATION | Facility: CLINIC | Age: 40
End: 2020-08-21

## 2020-08-21 NOTE — PROGRESS NOTES
Clinic Care Coordination Contact  Community Health Worker Follow Up    Goals:   Goals Addressed as of 8/21/2020 at 3:54 PM                 Today       Patient Stated      Functional (pt-stated)   On track    Added 7/9/20 by Carmella Hector BSW     Goal Statement: I would like to find more baby resources before my child arrives in 3 months.   Date Goal set: 7/9/2020   Barriers: Finding what places are open in covid   Strengths: open to all suggestions   Date to Achieve By: 10/9/2020   Patient expressed understanding of goal: yes     Action steps to achieve this goal:  1. I will contact the diaper bank of [place] and look where they offer free diapers (completed)  2. I will contact [facility] center for resources (completed)  3. I will look at secondhand stores for baby resources (ongoing)    Updated: 8/21/20          Other (pt-stated)   On track    Added 7/9/20 by Carmella Hector BSW     Goal Statement: I will work on obtaining a car seat in the next 2 months.   Date Goal set: 7/9/2020  Barriers: Resources for covid  Strengths: Asking for help  Date to Achieve By: 9/9/2020  Patient expressed understanding of goal: yes     Action steps to achieve this goal:  1. I will contact a  resource center to help me get a car seat in the next two weeks.  2. I will let CHW know that I need to set up another appt with BIBI to get me a car seat with [facility].     Updated: 8/21/20              Intervention and Education during outreach:    Spoke with pt today. She said she and baby are doing well and thanked me for the call.     Pt has connected with most of the resources that  BIBI shared with her last month. Pt said she has an appointment with [facility] on [facility] on September 7th. She has not yet called regarding a car seat but said she is planning to do it within the next two weeks. I told her to let me know if she has any difficulties as we would be happy to refer her through [facility]. Pt said she will  let me know.     Pt asked if we know how to help her get a breast pump. I confirmed pt's appointment with Dr. Ruiz in clinic on  and encouraged her to speak with her at that time. I let her know that she will be able to provide something for pt's health insurance so she can  the pump after baby is born. I offered to message Dr. Ruiz and let her know. Pt agreed and thanked me. Pt said she has an ultrasound on .    I noted pt's  is scheduled for , and she confirmed. She said that will be coming quick but she is excited. I offered to call pt back in 2 weeks to check in, and she agreed.    CHW Plan: Pt will call and follow up on a car seat. Pt will let CHW know at next outreach if she would like to be referred through [facility]. Pt will attend her ultrasound and prenatal appointments next week. CHW will message Dr. Ruiz regarding breast pump for pt. CHW will follow up in 2 weeks with pt.

## 2020-08-25 ENCOUNTER — OFFICE VISIT (OUTPATIENT)
Dept: MATERNAL FETAL MEDICINE | Facility: CLINIC | Age: 40
End: 2020-08-25
Attending: OBSTETRICS & GYNECOLOGY
Payer: COMMERCIAL

## 2020-08-25 ENCOUNTER — HOSPITAL ENCOUNTER (OUTPATIENT)
Dept: ULTRASOUND IMAGING | Facility: CLINIC | Age: 40
End: 2020-08-25
Attending: OBSTETRICS & GYNECOLOGY
Payer: COMMERCIAL

## 2020-08-25 DIAGNOSIS — O40.9XX0 POLYHYDRAMNIOS AFFECTING PREGNANCY: Primary | ICD-10-CM

## 2020-08-25 DIAGNOSIS — O44.00 PLACENTA PREVIA ANTEPARTUM: ICD-10-CM

## 2020-08-25 PROCEDURE — 76816 OB US FOLLOW-UP PER FETUS: CPT

## 2020-08-26 ENCOUNTER — PRENATAL OFFICE VISIT (OUTPATIENT)
Dept: OBGYN | Facility: CLINIC | Age: 40
End: 2020-08-26
Payer: COMMERCIAL

## 2020-08-26 VITALS
HEART RATE: 94 BPM | WEIGHT: 152 LBS | DIASTOLIC BLOOD PRESSURE: 68 MMHG | BODY MASS INDEX: 29.69 KG/M2 | SYSTOLIC BLOOD PRESSURE: 103 MMHG | OXYGEN SATURATION: 97 %

## 2020-08-26 DIAGNOSIS — O32.2XX0 TRANSVERSE LIE OF FETUS, SINGLE OR UNSPECIFIED FETUS: ICD-10-CM

## 2020-08-26 DIAGNOSIS — O09.523 ENCOUNTER FOR SUPERVISION OF MULTIGRAVIDA OF ADVANCED MATERNAL AGE IN THIRD TRIMESTER: Primary | ICD-10-CM

## 2020-08-26 DIAGNOSIS — O40.9XX0 POLYHYDRAMNIOS AFFECTING PREGNANCY: ICD-10-CM

## 2020-08-26 PROCEDURE — 99207 ZZC PRENATAL VISIT: CPT | Performed by: OBSTETRICS & GYNECOLOGY

## 2020-08-26 RX ORDER — BREAST PUMP
1 EACH MISCELLANEOUS ONCE
Qty: 1 EACH | Refills: 0 | Status: SHIPPED | OUTPATIENT
Start: 2020-08-26 | End: 2020-08-26

## 2020-08-26 NOTE — PROGRESS NOTES
34w0d  US : posterior placenta, no previa, 6cm from os. Fetus transverse with fetal head to maternal left. Polyhydramnios, MVP 10.4cm, JENIFER 29cm. AC >95th%. EFW 2632g (68%).    Elevated 1h gtt but all 4 values of 3h test wnl.     Previa resolved, no longer needs CS at 37 weeks. But baby is transverse. Discussed possible need for ECV if still transverse at 37 weeks, will change CS to ECV on .   Discussed if not cephalic when she comes in labor that CS would be recommended. Really does not want a  but understands indications. Has a . Discussed delivery at 39 weeks if persistent polyhydramnios. Discussed that if she has contractions or ruptures membranes that she should come to the hospital ASAP. Discussed risk of cord prolapse with transverse baby and polyhydramnios.     Planning on breastfeeding. Breast pump rx written today.    RTC 2 weeks, sooner PRN.    Laquita Ruiz MD

## 2020-08-26 NOTE — Clinical Note
Can you cancel her CS and schedule her for ECV for same day (9/18, anytime, I'm on call). I think Sreedhar was sched to help with her CS so you can release her from that duty.  Thanks SL

## 2020-08-27 NOTE — TELEPHONE ENCOUNTER
cancelled per SL. ECV scheduled with L&D for 20 at 900a. COVID scheduled.  Vivian Clinton, Surgery Coordinator

## 2020-09-02 ENCOUNTER — PATIENT OUTREACH (OUTPATIENT)
Dept: CARE COORDINATION | Facility: CLINIC | Age: 40
End: 2020-09-02

## 2020-09-02 NOTE — PROGRESS NOTES
Clinic Care Coordination Contact    Situation: Patient chart reviewed by care coordinator.    Background: SW CC's initial assessment and enrollment to Care Coordination was 7/9/2020.   Patient centered goals were developed with participation from patient. CC handed patient off to CHW for continued outreach every 30 days.     Assessment: CHW has been in contact with patient monthly. Patient has made progress to goals. Pt has an upcoming appointment with [facility] and she continues to work towards getting a car seat.       Plan/Recommendations: CHW will involve  CC as needed or if patient is ready to move to maintenance.     SW CC will continue to monitor progress to goals and CHW outreaches every 6 weeks.    Carmella Hector MSW  Clinic Care Coordinator   [facility]  [phone]

## 2020-09-08 ENCOUNTER — TELEPHONE (OUTPATIENT)
Dept: OBGYN | Facility: CLINIC | Age: 40
End: 2020-09-08

## 2020-09-08 ENCOUNTER — HOSPITAL ENCOUNTER (OUTPATIENT)
Dept: ULTRASOUND IMAGING | Facility: CLINIC | Age: 40
End: 2020-09-08
Attending: OBSTETRICS & GYNECOLOGY
Payer: COMMERCIAL

## 2020-09-08 ENCOUNTER — OFFICE VISIT (OUTPATIENT)
Dept: MATERNAL FETAL MEDICINE | Facility: CLINIC | Age: 40
End: 2020-09-08
Attending: OBSTETRICS & GYNECOLOGY
Payer: COMMERCIAL

## 2020-09-08 DIAGNOSIS — O40.9XX0 POLYHYDRAMNIOS AFFECTING PREGNANCY: ICD-10-CM

## 2020-09-08 DIAGNOSIS — O40.9XX0 POLYHYDRAMNIOS AFFECTING PREGNANCY: Primary | ICD-10-CM

## 2020-09-08 PROCEDURE — 76815 OB US LIMITED FETUS(S): CPT

## 2020-09-08 NOTE — TELEPHONE ENCOUNTER
If fetus is cephalic today, that is great news! Can you schedule her for a prenatal appt with me on 9/14 (ok to double book) or some one else on 9/15 to confirm with bedside US that baby is still cephalic... then we can confidently cancel her ECV (9/18) and COVID test (9/15 at 330pm).   Thanks,  Laquita Ruiz MD

## 2020-09-08 NOTE — PROGRESS NOTES
Please see ultrasound report under imaging tab for details on ultrasound performed today.    Laquita Gonzales MD  , OB/GYN  Maternal-Fetal Medicine  [email or web address]  [phone] (Academic office)  [phone] (Pager)

## 2020-09-08 NOTE — TELEPHONE ENCOUNTER
Call from [facility]. Patient had visit/US with them this morning. Patient is scheduled for a version on 9/18, but today's scan showed fetus head down. She sees [facility] again (to reassess fetal growth) followed by appointment with you on 9/22. Do you want to have another US done prior to the 18th? Vania Garcia RN

## 2020-09-10 ENCOUNTER — TELEPHONE (OUTPATIENT)
Dept: OBGYN | Facility: CLINIC | Age: 40
End: 2020-09-10

## 2020-09-10 ENCOUNTER — PATIENT OUTREACH (OUTPATIENT)
Dept: CARE COORDINATION | Facility: CLINIC | Age: 40
End: 2020-09-10

## 2020-09-10 NOTE — PROGRESS NOTES
"Clinic Care Coordination Contact  Community Health Worker Follow Up    Goals: both goals completed per today's outreach.    Intervention and Education during outreach:   Spoke with pt today to follow up. She said she is not doing great--she's having a lot of numbness in her arms and hands, feels like \"tingling all over.\" She said she had something similar with her first pregnancy with her 12 y/o daughter but it was only in one hand. She reports bruising on her legs and backs of her knees and a decreased appetite. Pt said she hasn't been able to sleep, which she needs right now, and was up this morning from 1-4 am and then had to go to work at 6:30 am. I asked if she's called and talked with the clinic about what she's experiencing, and she said that she was going to wait to talk with Dr. Ruiz on Monday at her prenatal appointment. I offered to follow up and ask a triage nurse to call her to check in and make sure something else isn't going on with her or baby. She agreed.     Pt said she got a prescription for a breast pump and thanked me for following up with Dr. Ruiz. She also got a car seat. Goal completed.     Pt said she has a  and will be meeting with her on Wednesday to plan for labor and delivery.     CHW Plan: CHW will message clinic and ask triage nurse to call pt and follow up on symptoms pt is reporting. CHW will follow up with pt in one week.   "

## 2020-09-10 NOTE — TELEPHONE ENCOUNTER
"Pt is 36w1d.  Had phone call with care coordinator today and the CC sent this message.  Do you have any concerns for the pt today?  Any recommendations?      Estela Minor, RN        Intervention and Education during outreach:   Spoke with pt today to follow up. She said she is not doing great--she's having a lot of numbness in her arms and hands, feels like \"tingling all over.\" She said she had something similar with her first pregnancy with her 14 y/o daughter but it was only in one hand. She reports bruising on her legs and backs of her knees and a decreased appetite. Pt said she hasn't been able to sleep, which she needs right now, and was up this morning from 1-4 am and then had to go to work at 6:30 am. I asked if she's called and talked with the clinic about what she's experiencing, and she said that she was going to wait to talk with Dr. Ruiz on Monday at her prenatal appointment. I offered to follow up and ask a triage nurse to call her to check in and make sure something else isn't going on with her or baby. She agreed.   "

## 2020-09-11 ENCOUNTER — PRENATAL OFFICE VISIT (OUTPATIENT)
Dept: OBGYN | Facility: CLINIC | Age: 40
End: 2020-09-11
Payer: COMMERCIAL

## 2020-09-11 VITALS
BODY MASS INDEX: 29.88 KG/M2 | WEIGHT: 153 LBS | HEART RATE: 102 BPM | DIASTOLIC BLOOD PRESSURE: 76 MMHG | SYSTOLIC BLOOD PRESSURE: 119 MMHG | TEMPERATURE: 98 F

## 2020-09-11 DIAGNOSIS — O09.523 ENCOUNTER FOR SUPERVISION OF MULTIGRAVIDA OF ADVANCED MATERNAL AGE IN THIRD TRIMESTER: Primary | ICD-10-CM

## 2020-09-11 DIAGNOSIS — G56.03 BILATERAL CARPAL TUNNEL SYNDROME: ICD-10-CM

## 2020-09-11 LAB
ERYTHROCYTE [DISTWIDTH] IN BLOOD BY AUTOMATED COUNT: 13.6 % (ref 10–15)
HCT VFR BLD AUTO: 35.1 % (ref 35–47)
HGB BLD-MCNC: 12.1 G/DL (ref 11.7–15.7)
MCH RBC QN AUTO: 31.7 PG (ref 26.5–33)
MCHC RBC AUTO-ENTMCNC: 34.5 G/DL (ref 31.5–36.5)
MCV RBC AUTO: 92 FL (ref 78–100)
PLATELET # BLD AUTO: 250 10E9/L (ref 150–450)
RBC # BLD AUTO: 3.82 10E12/L (ref 3.8–5.2)
WBC # BLD AUTO: 5 10E9/L (ref 4–11)

## 2020-09-11 PROCEDURE — 99207 ZZC PRENATAL VISIT: CPT | Performed by: OBSTETRICS & GYNECOLOGY

## 2020-09-11 PROCEDURE — 87186 SC STD MICRODIL/AGAR DIL: CPT | Performed by: OBSTETRICS & GYNECOLOGY

## 2020-09-11 PROCEDURE — 85027 COMPLETE CBC AUTOMATED: CPT | Performed by: OBSTETRICS & GYNECOLOGY

## 2020-09-11 PROCEDURE — 87653 STREP B DNA AMP PROBE: CPT | Performed by: OBSTETRICS & GYNECOLOGY

## 2020-09-11 PROCEDURE — 36415 COLL VENOUS BLD VENIPUNCTURE: CPT | Performed by: OBSTETRICS & GYNECOLOGY

## 2020-09-11 RX ORDER — PNV NO.95/FERROUS FUM/FOLIC AC 28MG-0.8MG
1 TABLET ORAL DAILY
Qty: 100 TABLET | Refills: 3 | Status: ON HOLD | OUTPATIENT
Start: 2020-09-11 | End: 2020-10-07

## 2020-09-11 RX ORDER — AMOXICILLIN 250 MG
1 CAPSULE ORAL DAILY
Qty: 100 TABLET | Refills: 0 | Status: ON HOLD | OUTPATIENT
Start: 2020-09-11 | End: 2020-10-07

## 2020-09-11 RX ORDER — IBUPROFEN 600 MG/1
600 TABLET, FILM COATED ORAL EVERY 6 HOURS PRN
Qty: 60 TABLET | Refills: 0 | Status: ON HOLD | OUTPATIENT
Start: 2020-09-11 | End: 2020-10-07

## 2020-09-11 RX ORDER — ACETAMINOPHEN 325 MG/1
650 TABLET ORAL EVERY 6 HOURS PRN
Qty: 100 TABLET | Refills: 0 | Status: ON HOLD | OUTPATIENT
Start: 2020-09-11 | End: 2020-10-07

## 2020-09-11 NOTE — TELEPHONE ENCOUNTER
Spoke with Vania ESTRADA at the end of the day and asked her to reach out to the patient.  Symptoms sound like carpal tunnel, although would need office visit to evaluate LE bruising.  Could be offered office visit on Friday for evaluation and likely reassurance before the weekend.  Also has CBC pending, which would be helpful in evaluation of bruising.    Laquita Us MD

## 2020-09-11 NOTE — PROGRESS NOTES
36w2d  Cephalic by BSUS. Will cancel ECV.  Bilateral wrist and hand pain, hard to sleep. rx for wrist braces.   Reports bruising on back of her legs - no bruising apparent, some hyperpigmentation under buttocks.   Active fetal movement. No leaking or bleeding.  Rx for PP OTC meds done.   GBS collected.  Discussed if persistent poly she should have IOL at 39w.  Reviewed s/sx labor, kick counts.   RTC weekly  Laquita Ruiz MD

## 2020-09-11 NOTE — TELEPHONE ENCOUNTER
Spoke with patient yesterday late afternoon. I scheduled her for an appointment today 9/11 at 2:45 with Dr. Ruiz to discuss leg bruising (have CBC drawn) and numbness and tingling in arms and hands. Vania Garcia RN

## 2020-09-11 NOTE — Clinical Note
Can you cancel this patient's ECV for next week fri 9/18 and also her COVID test/ Thanks  Laquita Ruiz MD

## 2020-09-12 LAB
GP B STREP DNA SPEC QL NAA+PROBE: POSITIVE
SPECIMEN SOURCE: ABNORMAL

## 2020-09-14 PROBLEM — O99.820 GBS (GROUP B STREPTOCOCCUS CARRIER), +RV CULTURE, CURRENTLY PREGNANT: Status: ACTIVE | Noted: 2020-09-14

## 2020-09-15 LAB
BACTERIA SPEC CULT: ABNORMAL
SPECIMEN SOURCE: ABNORMAL

## 2020-09-22 ENCOUNTER — PRENATAL OFFICE VISIT (OUTPATIENT)
Dept: OBGYN | Facility: CLINIC | Age: 40
End: 2020-09-22
Payer: COMMERCIAL

## 2020-09-22 ENCOUNTER — OFFICE VISIT (OUTPATIENT)
Dept: MATERNAL FETAL MEDICINE | Facility: CLINIC | Age: 40
End: 2020-09-22
Attending: OBSTETRICS & GYNECOLOGY
Payer: COMMERCIAL

## 2020-09-22 ENCOUNTER — HOSPITAL ENCOUNTER (OUTPATIENT)
Dept: ULTRASOUND IMAGING | Facility: CLINIC | Age: 40
End: 2020-09-22
Attending: OBSTETRICS & GYNECOLOGY
Payer: COMMERCIAL

## 2020-09-22 VITALS
SYSTOLIC BLOOD PRESSURE: 105 MMHG | WEIGHT: 154 LBS | BODY MASS INDEX: 30.08 KG/M2 | DIASTOLIC BLOOD PRESSURE: 71 MMHG | TEMPERATURE: 97.7 F | HEART RATE: 80 BPM

## 2020-09-22 DIAGNOSIS — O40.9XX0 POLYHYDRAMNIOS AFFECTING PREGNANCY: ICD-10-CM

## 2020-09-22 DIAGNOSIS — O40.9XX0 POLYHYDRAMNIOS AFFECTING PREGNANCY: Primary | ICD-10-CM

## 2020-09-22 DIAGNOSIS — O09.523 ENCOUNTER FOR SUPERVISION OF MULTIGRAVIDA OF ADVANCED MATERNAL AGE IN THIRD TRIMESTER: Primary | ICD-10-CM

## 2020-09-22 PROCEDURE — 76816 OB US FOLLOW-UP PER FETUS: CPT

## 2020-09-22 PROCEDURE — 99207 ZZC PRENATAL VISIT: CPT | Performed by: OBSTETRICS & GYNECOLOGY

## 2020-09-22 PROCEDURE — 59025 FETAL NON-STRESS TEST: CPT | Mod: ZF | Performed by: OBSTETRICS & GYNECOLOGY

## 2020-09-22 RX ORDER — OXYTOCIN/0.9 % SODIUM CHLORIDE 30/500 ML
100-340 PLASTIC BAG, INJECTION (ML) INTRAVENOUS CONTINUOUS PRN
Status: CANCELLED | OUTPATIENT
Start: 2020-09-22

## 2020-09-22 RX ORDER — ACETAMINOPHEN 325 MG/1
650 TABLET ORAL EVERY 4 HOURS PRN
Status: CANCELLED | OUTPATIENT
Start: 2020-09-22

## 2020-09-22 RX ORDER — OXYCODONE AND ACETAMINOPHEN 5; 325 MG/1; MG/1
1 TABLET ORAL
Status: CANCELLED | OUTPATIENT
Start: 2020-09-22

## 2020-09-22 RX ORDER — IBUPROFEN 200 MG
800 TABLET ORAL
Status: CANCELLED | OUTPATIENT
Start: 2020-09-22

## 2020-09-22 RX ORDER — METHYLERGONOVINE MALEATE 0.2 MG/ML
200 INJECTION INTRAVENOUS
Status: CANCELLED | OUTPATIENT
Start: 2020-09-22

## 2020-09-22 RX ORDER — ACETAMINOPHEN 325 MG/1
650 TABLET ORAL EVERY 6 HOURS PRN
Qty: 100 TABLET | Refills: 0 | Status: SHIPPED | OUTPATIENT
Start: 2020-09-22

## 2020-09-22 RX ORDER — FENTANYL CITRATE 50 UG/ML
50-100 INJECTION, SOLUTION INTRAMUSCULAR; INTRAVENOUS
Status: CANCELLED | OUTPATIENT
Start: 2020-09-22

## 2020-09-22 RX ORDER — ONDANSETRON 2 MG/ML
4 INJECTION INTRAMUSCULAR; INTRAVENOUS EVERY 6 HOURS PRN
Status: CANCELLED | OUTPATIENT
Start: 2020-09-22

## 2020-09-22 RX ORDER — AMOXICILLIN 250 MG
1 CAPSULE ORAL DAILY
Qty: 100 TABLET | Refills: 0 | Status: SHIPPED | OUTPATIENT
Start: 2020-09-22 | End: 2020-12-16

## 2020-09-22 RX ORDER — LIDOCAINE 40 MG/G
CREAM TOPICAL
Status: CANCELLED | OUTPATIENT
Start: 2020-09-22

## 2020-09-22 RX ORDER — NALOXONE HYDROCHLORIDE 0.4 MG/ML
.1-.4 INJECTION, SOLUTION INTRAMUSCULAR; INTRAVENOUS; SUBCUTANEOUS
Status: CANCELLED | OUTPATIENT
Start: 2020-09-22

## 2020-09-22 RX ORDER — OXYTOCIN 10 [USP'U]/ML
10 INJECTION, SOLUTION INTRAMUSCULAR; INTRAVENOUS
Status: CANCELLED | OUTPATIENT
Start: 2020-09-22

## 2020-09-22 RX ORDER — CARBOPROST TROMETHAMINE 250 UG/ML
250 INJECTION, SOLUTION INTRAMUSCULAR
Status: CANCELLED | OUTPATIENT
Start: 2020-09-22

## 2020-09-22 RX ORDER — IBUPROFEN 600 MG/1
600 TABLET, FILM COATED ORAL EVERY 6 HOURS PRN
Qty: 60 TABLET | Refills: 0 | Status: SHIPPED | OUTPATIENT
Start: 2020-09-22

## 2020-09-22 RX ORDER — SODIUM CHLORIDE, SODIUM LACTATE, POTASSIUM CHLORIDE, CALCIUM CHLORIDE 600; 310; 30; 20 MG/100ML; MG/100ML; MG/100ML; MG/100ML
INJECTION, SOLUTION INTRAVENOUS CONTINUOUS
Status: CANCELLED | OUTPATIENT
Start: 2020-09-22

## 2020-09-22 NOTE — PROGRESS NOTES
37w6d  US today discussed with Dr. Mcmahan - EFW 76%, mild poly JENIFER 27cm, cephalic but body is kind of oblique, did not see much fetal movement (though not a formal BPP), NST reactive.    Active fetal movement. Occasional contractions. No leaking, bleeding or abnormal discharge.     GBS: Positive  Hemoglobin   Date Value Ref Range Status   09/11/2020 12.1 11.7 - 15.7 g/dL Final       Breast pump rx: Done  Labor orders: Done  Birth plan: flexble  Length of stay: discussed  Disability paperwork: done today  Resident involvement: discussed and agrees.  OTC postpartum meds: done     Reviewed labor instructions, kick counts, s/sx pre-eclampsia.  RTC 1 week. If still poly next week will sched IOL for 39+ weeks.    Laquita Ruiz MD

## 2020-09-22 NOTE — NURSING NOTE
NST performed for no fetal movement noted by sonographer during growth ultrasound.  See separate documentation. Dr. Mcmahan reviewed strip and discussed ultrasound results with pt.  Pt to return for BPP next week.

## 2020-09-23 ENCOUNTER — TELEPHONE (OUTPATIENT)
Dept: OBGYN | Facility: CLINIC | Age: 40
End: 2020-09-23

## 2020-09-23 NOTE — TELEPHONE ENCOUNTER
Forms received and filled out, signed by provider. Copy sent to HIM, original mailed to employer in provided envelope.  Vivian Clinton, Surgery Coordinator

## 2020-09-23 NOTE — TELEPHONE ENCOUNTER
Patient is 38w0d, has a  and the  would like to know if it's okay to do some exercises to help the patient and baby get ready for IOL since baby wasn't moving much yesterday.  Pt doesn't know specific exercises. Also, is it okay for her to have sex? Please advise. Thanks.   Vania Miranda, RN-BSN

## 2020-09-30 ENCOUNTER — PRENATAL OFFICE VISIT (OUTPATIENT)
Dept: OBGYN | Facility: CLINIC | Age: 40
End: 2020-09-30
Payer: COMMERCIAL

## 2020-09-30 ENCOUNTER — HOSPITAL ENCOUNTER (OUTPATIENT)
Dept: ULTRASOUND IMAGING | Facility: CLINIC | Age: 40
End: 2020-09-30
Attending: OBSTETRICS & GYNECOLOGY
Payer: COMMERCIAL

## 2020-09-30 ENCOUNTER — OFFICE VISIT (OUTPATIENT)
Dept: MATERNAL FETAL MEDICINE | Facility: CLINIC | Age: 40
End: 2020-09-30
Attending: OBSTETRICS & GYNECOLOGY
Payer: COMMERCIAL

## 2020-09-30 VITALS
DIASTOLIC BLOOD PRESSURE: 65 MMHG | BODY MASS INDEX: 30.31 KG/M2 | HEART RATE: 87 BPM | SYSTOLIC BLOOD PRESSURE: 99 MMHG | WEIGHT: 155.2 LBS

## 2020-09-30 DIAGNOSIS — O09.523 ENCOUNTER FOR SUPERVISION OF MULTIGRAVIDA OF ADVANCED MATERNAL AGE IN THIRD TRIMESTER: Primary | ICD-10-CM

## 2020-09-30 DIAGNOSIS — O40.9XX0 POLYHYDRAMNIOS AFFECTING PREGNANCY: Primary | ICD-10-CM

## 2020-09-30 DIAGNOSIS — O40.9XX0 POLYHYDRAMNIOS AFFECTING PREGNANCY: ICD-10-CM

## 2020-09-30 PROCEDURE — 99207 ZZC PRENATAL VISIT: CPT | Performed by: OBSTETRICS & GYNECOLOGY

## 2020-09-30 PROCEDURE — U0003 INFECTIOUS AGENT DETECTION BY NUCLEIC ACID (DNA OR RNA); SEVERE ACUTE RESPIRATORY SYNDROME CORONAVIRUS 2 (SARS-COV-2) (CORONAVIRUS DISEASE [COVID-19]), AMPLIFIED PROBE TECHNIQUE, MAKING USE OF HIGH THROUGHPUT TECHNOLOGIES AS DESCRIBED BY CMS-2020-01-R: HCPCS | Performed by: OBSTETRICS & GYNECOLOGY

## 2020-09-30 PROCEDURE — 76819 FETAL BIOPHYS PROFIL W/O NST: CPT

## 2020-09-30 ASSESSMENT — PAIN SCALES - GENERAL: PAINLEVEL: NO PAIN (0)

## 2020-09-30 NOTE — PROGRESS NOTES
39w0d  Active fetal movement. No contractions, no leaking or bleeding.   US today MVP 9.2cm, JENIFER 18.9cm, BPP 8/8, cephalic  SCE 1/long/high  Discussed that with borderline poly, AMA and size of this fetus >1lb greater than largest baby that I recommend IOL this week. She desires IOL. Scheduled for 10/3 at 10am. Discussed IOL process - cervical ripening, etc.  COVID 19 test done today in clinic.  Laquita Ruiz MD

## 2020-10-01 ENCOUNTER — TELEPHONE (OUTPATIENT)
Dept: OBGYN | Facility: CLINIC | Age: 40
End: 2020-10-01

## 2020-10-01 LAB
LABORATORY COMMENT REPORT: NORMAL
SARS-COV-2 RNA SPEC QL NAA+PROBE: NEGATIVE
SARS-COV-2 RNA SPEC QL NAA+PROBE: NORMAL
SPECIMEN SOURCE: NORMAL
SPECIMEN SOURCE: NORMAL

## 2020-10-01 NOTE — TELEPHONE ENCOUNTER
Pt is scheduled for IOL on 10/3 at 10a. Pt calling to change time to evening. TC to L&D. Changed induction time to 7p. TC to patient. Pt aware to call at 6p to make sure bed open. IOL scheduled for 7p on 10/3. Routing to  as ALEJANDRO.   Vania Miranda, RN-BSN

## 2020-10-02 ENCOUNTER — PATIENT OUTREACH (OUTPATIENT)
Dept: CARE COORDINATION | Facility: CLINIC | Age: 40
End: 2020-10-02

## 2020-10-02 NOTE — PROGRESS NOTES
Clinic Care Coordination Contact  [facility]/Voicemail       Clinical Data: Care Coordinator Outreach  Outreach attempted x 1.  Left message on patient's voicemail with call back information and requested return call.  Plan:Care Coordinator will try to reach patient again in one week.    Notes:  Pt scheduled for IOL tomorrow at 7 pm.

## 2020-10-03 ENCOUNTER — NURSE TRIAGE (OUTPATIENT)
Dept: NURSING | Facility: CLINIC | Age: 40
End: 2020-10-03

## 2020-10-03 ENCOUNTER — HOSPITAL ENCOUNTER (INPATIENT)
Facility: CLINIC | Age: 40
LOS: 4 days | Discharge: HOME HEALTH | End: 2020-10-07
Attending: OBSTETRICS & GYNECOLOGY | Admitting: OBSTETRICS & GYNECOLOGY
Payer: COMMERCIAL

## 2020-10-03 DIAGNOSIS — Z98.891 S/P CESAREAN SECTION: Primary | ICD-10-CM

## 2020-10-03 LAB
ABO + RH BLD: NORMAL
ABO + RH BLD: NORMAL
BLD GP AB SCN SERPL QL: NORMAL
BLOOD BANK CMNT PATIENT-IMP: NORMAL
HGB BLD-MCNC: 12.4 G/DL (ref 11.7–15.7)
PLATELET # BLD AUTO: 247 10E9/L (ref 150–450)
SPECIMEN EXP DATE BLD: NORMAL

## 2020-10-03 PROCEDURE — 86900 BLOOD TYPING SEROLOGIC ABO: CPT | Performed by: STUDENT IN AN ORGANIZED HEALTH CARE EDUCATION/TRAINING PROGRAM

## 2020-10-03 PROCEDURE — 250N000013 HC RX MED GY IP 250 OP 250 PS 637: Performed by: OBSTETRICS & GYNECOLOGY

## 2020-10-03 PROCEDURE — 86850 RBC ANTIBODY SCREEN: CPT | Performed by: STUDENT IN AN ORGANIZED HEALTH CARE EDUCATION/TRAINING PROGRAM

## 2020-10-03 PROCEDURE — 120N000002 HC R&B MED SURG/OB UMMC

## 2020-10-03 PROCEDURE — 59200 INSERT CERVICAL DILATOR: CPT | Performed by: OBSTETRICS & GYNECOLOGY

## 2020-10-03 PROCEDURE — 3E0P7VZ INTRODUCTION OF HORMONE INTO FEMALE REPRODUCTIVE, VIA NATURAL OR ARTIFICIAL OPENING: ICD-10-PCS | Performed by: OBSTETRICS & GYNECOLOGY

## 2020-10-03 PROCEDURE — 85018 HEMOGLOBIN: CPT | Performed by: STUDENT IN AN ORGANIZED HEALTH CARE EDUCATION/TRAINING PROGRAM

## 2020-10-03 PROCEDURE — 85027 COMPLETE CBC AUTOMATED: CPT | Performed by: STUDENT IN AN ORGANIZED HEALTH CARE EDUCATION/TRAINING PROGRAM

## 2020-10-03 PROCEDURE — 85049 AUTOMATED PLATELET COUNT: CPT | Performed by: STUDENT IN AN ORGANIZED HEALTH CARE EDUCATION/TRAINING PROGRAM

## 2020-10-03 PROCEDURE — 250N000011 HC RX IP 250 OP 636: Performed by: STUDENT IN AN ORGANIZED HEALTH CARE EDUCATION/TRAINING PROGRAM

## 2020-10-03 PROCEDURE — 86780 TREPONEMA PALLIDUM: CPT | Performed by: STUDENT IN AN ORGANIZED HEALTH CARE EDUCATION/TRAINING PROGRAM

## 2020-10-03 PROCEDURE — 86901 BLOOD TYPING SEROLOGIC RH(D): CPT | Performed by: STUDENT IN AN ORGANIZED HEALTH CARE EDUCATION/TRAINING PROGRAM

## 2020-10-03 RX ORDER — ONDANSETRON 2 MG/ML
4 INJECTION INTRAMUSCULAR; INTRAVENOUS EVERY 6 HOURS PRN
Status: DISCONTINUED | OUTPATIENT
Start: 2020-10-03 | End: 2020-10-05

## 2020-10-03 RX ORDER — ACETAMINOPHEN 325 MG/1
650 TABLET ORAL EVERY 4 HOURS PRN
Status: DISCONTINUED | OUTPATIENT
Start: 2020-10-03 | End: 2020-10-05

## 2020-10-03 RX ORDER — LIDOCAINE HYDROCHLORIDE 10 MG/ML
INJECTION, SOLUTION EPIDURAL; INFILTRATION; INTRACAUDAL; PERINEURAL
Status: DISCONTINUED
Start: 2020-10-03 | End: 2020-10-04 | Stop reason: HOSPADM

## 2020-10-03 RX ORDER — MISOPROSTOL 100 UG/1
25 TABLET ORAL
Status: DISCONTINUED | OUTPATIENT
Start: 2020-10-03 | End: 2020-10-03

## 2020-10-03 RX ORDER — MISOPROSTOL 100 UG/1
25 TABLET ORAL EVERY 4 HOURS PRN
Status: DISCONTINUED | OUTPATIENT
Start: 2020-10-03 | End: 2020-10-03

## 2020-10-03 RX ORDER — OXYTOCIN/0.9 % SODIUM CHLORIDE 30/500 ML
100-340 PLASTIC BAG, INJECTION (ML) INTRAVENOUS CONTINUOUS PRN
Status: COMPLETED | OUTPATIENT
Start: 2020-10-03 | End: 2020-10-05

## 2020-10-03 RX ORDER — LIDOCAINE 40 MG/G
CREAM TOPICAL
Status: DISCONTINUED | OUTPATIENT
Start: 2020-10-03 | End: 2020-10-05

## 2020-10-03 RX ORDER — MISOPROSTOL 200 UG/1
TABLET ORAL
Status: DISCONTINUED
Start: 2020-10-03 | End: 2020-10-04 | Stop reason: HOSPADM

## 2020-10-03 RX ORDER — NALOXONE HYDROCHLORIDE 0.4 MG/ML
.1-.4 INJECTION, SOLUTION INTRAMUSCULAR; INTRAVENOUS; SUBCUTANEOUS
Status: DISCONTINUED | OUTPATIENT
Start: 2020-10-03 | End: 2020-10-05

## 2020-10-03 RX ORDER — METHYLERGONOVINE MALEATE 0.2 MG/ML
200 INJECTION INTRAVENOUS
Status: DISCONTINUED | OUTPATIENT
Start: 2020-10-03 | End: 2020-10-05

## 2020-10-03 RX ORDER — OXYTOCIN 10 [USP'U]/ML
INJECTION, SOLUTION INTRAMUSCULAR; INTRAVENOUS
Status: DISCONTINUED
Start: 2020-10-03 | End: 2020-10-04 | Stop reason: HOSPADM

## 2020-10-03 RX ORDER — OXYTOCIN 10 [USP'U]/ML
10 INJECTION, SOLUTION INTRAMUSCULAR; INTRAVENOUS
Status: DISCONTINUED | OUTPATIENT
Start: 2020-10-03 | End: 2020-10-05

## 2020-10-03 RX ORDER — FENTANYL CITRATE 50 UG/ML
50-100 INJECTION, SOLUTION INTRAMUSCULAR; INTRAVENOUS
Status: DISCONTINUED | OUTPATIENT
Start: 2020-10-03 | End: 2020-10-05

## 2020-10-03 RX ORDER — OXYCODONE AND ACETAMINOPHEN 5; 325 MG/1; MG/1
1 TABLET ORAL
Status: DISCONTINUED | OUTPATIENT
Start: 2020-10-03 | End: 2020-10-05

## 2020-10-03 RX ORDER — OXYTOCIN/0.9 % SODIUM CHLORIDE 30/500 ML
PLASTIC BAG, INJECTION (ML) INTRAVENOUS
Status: DISCONTINUED
Start: 2020-10-03 | End: 2020-10-04 | Stop reason: HOSPADM

## 2020-10-03 RX ORDER — SODIUM CHLORIDE, SODIUM LACTATE, POTASSIUM CHLORIDE, CALCIUM CHLORIDE 600; 310; 30; 20 MG/100ML; MG/100ML; MG/100ML; MG/100ML
INJECTION, SOLUTION INTRAVENOUS CONTINUOUS
Status: DISCONTINUED | OUTPATIENT
Start: 2020-10-03 | End: 2020-10-05

## 2020-10-03 RX ORDER — PENICILLIN G POTASSIUM 5000000 [IU]/1
5 INJECTION, POWDER, FOR SOLUTION INTRAMUSCULAR; INTRAVENOUS ONCE
Status: COMPLETED | OUTPATIENT
Start: 2020-10-03 | End: 2020-10-04

## 2020-10-03 RX ORDER — TERBUTALINE SULFATE 1 MG/ML
0.25 INJECTION, SOLUTION SUBCUTANEOUS
Status: DISCONTINUED | OUTPATIENT
Start: 2020-10-03 | End: 2020-10-05

## 2020-10-03 RX ORDER — IBUPROFEN 800 MG/1
800 TABLET, FILM COATED ORAL
Status: DISCONTINUED | OUTPATIENT
Start: 2020-10-03 | End: 2020-10-05

## 2020-10-03 RX ORDER — CARBOPROST TROMETHAMINE 250 UG/ML
250 INJECTION, SOLUTION INTRAMUSCULAR
Status: DISCONTINUED | OUTPATIENT
Start: 2020-10-03 | End: 2020-10-05

## 2020-10-03 RX ORDER — OXYTOCIN/0.9 % SODIUM CHLORIDE 30/500 ML
1-24 PLASTIC BAG, INJECTION (ML) INTRAVENOUS CONTINUOUS
Status: DISCONTINUED | OUTPATIENT
Start: 2020-10-04 | End: 2020-10-05

## 2020-10-03 RX ADMIN — PENICILLIN G POTASSIUM 5 MILLION UNITS: 5000000 POWDER, FOR SOLUTION INTRAMUSCULAR; INTRAPLEURAL; INTRATHECAL; INTRAVENOUS at 23:10

## 2020-10-03 RX ADMIN — Medication 25 MCG: at 21:15

## 2020-10-03 ASSESSMENT — ACTIVITIES OF DAILY LIVING (ADL)
FALL_HISTORY_WITHIN_LAST_SIX_MONTHS: NO
TOILETING_ISSUES: NO

## 2020-10-03 ASSESSMENT — MIFFLIN-ST. JEOR: SCORE: 1295.04

## 2020-10-03 NOTE — TELEPHONE ENCOUNTER
Sailaja calls in to report that she is scheduled for an induction at [facility]. They just called her from L&D as I called them so she is up to date on what to do and they know about her case.

## 2020-10-04 ENCOUNTER — ANESTHESIA (OUTPATIENT)
Dept: OBGYN | Facility: CLINIC | Age: 40
End: 2020-10-04
Payer: COMMERCIAL

## 2020-10-04 ENCOUNTER — ANESTHESIA EVENT (OUTPATIENT)
Dept: OBGYN | Facility: CLINIC | Age: 40
End: 2020-10-04
Payer: COMMERCIAL

## 2020-10-04 ENCOUNTER — ANCILLARY PROCEDURE (OUTPATIENT)
Dept: ULTRASOUND IMAGING | Facility: CLINIC | Age: 40
End: 2020-10-04
Attending: STUDENT IN AN ORGANIZED HEALTH CARE EDUCATION/TRAINING PROGRAM
Payer: COMMERCIAL

## 2020-10-04 LAB
ERYTHROCYTE [DISTWIDTH] IN BLOOD BY AUTOMATED COUNT: 13.7 % (ref 10–15)
HCT VFR BLD AUTO: 36.7 % (ref 35–47)
HGB BLD-MCNC: 12.4 G/DL (ref 11.7–15.7)
MCH RBC QN AUTO: 31.4 PG (ref 26.5–33)
MCHC RBC AUTO-ENTMCNC: 33.8 G/DL (ref 31.5–36.5)
MCV RBC AUTO: 93 FL (ref 78–100)
PLATELET # BLD AUTO: 243 10E9/L (ref 150–450)
RBC # BLD AUTO: 3.95 10E12/L (ref 3.8–5.2)
T PALLIDUM AB SER QL: NONREACTIVE
WBC # BLD AUTO: 4.7 10E9/L (ref 4–11)

## 2020-10-04 PROCEDURE — 250N000011 HC RX IP 250 OP 636: Performed by: STUDENT IN AN ORGANIZED HEALTH CARE EDUCATION/TRAINING PROGRAM

## 2020-10-04 PROCEDURE — 250N000011 HC RX IP 250 OP 636

## 2020-10-04 PROCEDURE — 999N000139 HC STATISTIC PRE-PROCEDURE ASSESSMENT II: Performed by: OBSTETRICS & GYNECOLOGY

## 2020-10-04 PROCEDURE — 120N000002 HC R&B MED SURG/OB UMMC

## 2020-10-04 PROCEDURE — 10907ZC DRAINAGE OF AMNIOTIC FLUID, THERAPEUTIC FROM PRODUCTS OF CONCEPTION, VIA NATURAL OR ARTIFICIAL OPENING: ICD-10-PCS | Performed by: OBSTETRICS & GYNECOLOGY

## 2020-10-04 PROCEDURE — 360N000022 HC SURGERY LEVEL 3 1ST 30 MIN - UMMC: Performed by: OBSTETRICS & GYNECOLOGY

## 2020-10-04 PROCEDURE — C1765 ADHESION BARRIER: HCPCS | Performed by: OBSTETRICS & GYNECOLOGY

## 2020-10-04 PROCEDURE — 250N000009 HC RX 250: Performed by: STUDENT IN AN ORGANIZED HEALTH CARE EDUCATION/TRAINING PROGRAM

## 2020-10-04 PROCEDURE — 258N000003 HC RX IP 258 OP 636: Performed by: NURSE ANESTHETIST, CERTIFIED REGISTERED

## 2020-10-04 PROCEDURE — 360N000023 HC SURGERY LEVEL 3 EA 15 ADDTL MIN UMMC: Performed by: OBSTETRICS & GYNECOLOGY

## 2020-10-04 PROCEDURE — 250N000009 HC RX 250: Performed by: ANESTHESIOLOGY

## 2020-10-04 PROCEDURE — 761N000003 HC RECOVERY PHASE 1 LEVEL 2 FIRST HR: Performed by: OBSTETRICS & GYNECOLOGY

## 2020-10-04 PROCEDURE — 59400 OBSTETRICAL CARE: CPT | Mod: GC | Performed by: OBSTETRICS & GYNECOLOGY

## 2020-10-04 PROCEDURE — 370N000002 HC ANESTHESIA TECHNICAL FEE, EACH ADDTL 15 MIN: Performed by: OBSTETRICS & GYNECOLOGY

## 2020-10-04 PROCEDURE — 3E0R3BZ INTRODUCTION OF ANESTHETIC AGENT INTO SPINAL CANAL, PERCUTANEOUS APPROACH: ICD-10-PCS | Performed by: ANESTHESIOLOGY

## 2020-10-04 PROCEDURE — 370N000001 HC ANESTHESIA TECHNICAL FEE, 1ST 30 MIN: Performed by: OBSTETRICS & GYNECOLOGY

## 2020-10-04 PROCEDURE — 272N000001 HC OR GENERAL SUPPLY STERILE: Performed by: OBSTETRICS & GYNECOLOGY

## 2020-10-04 PROCEDURE — 271N000001 HC OR GENERAL SUPPLY NON-STERILE: Performed by: OBSTETRICS & GYNECOLOGY

## 2020-10-04 PROCEDURE — 250N000009 HC RX 250: Performed by: NURSE ANESTHETIST, CERTIFIED REGISTERED

## 2020-10-04 PROCEDURE — 258N000003 HC RX IP 258 OP 636: Performed by: STUDENT IN AN ORGANIZED HEALTH CARE EDUCATION/TRAINING PROGRAM

## 2020-10-04 PROCEDURE — 999N000011 HC STATISTIC ANESTHESIA CASE

## 2020-10-04 PROCEDURE — 250N000011 HC RX IP 250 OP 636: Performed by: ANESTHESIOLOGY

## 2020-10-04 PROCEDURE — 00HU33Z INSERTION OF INFUSION DEVICE INTO SPINAL CANAL, PERCUTANEOUS APPROACH: ICD-10-PCS | Performed by: ANESTHESIOLOGY

## 2020-10-04 PROCEDURE — 250N000011 HC RX IP 250 OP 636: Performed by: NURSE ANESTHETIST, CERTIFIED REGISTERED

## 2020-10-04 RX ORDER — NALOXONE HYDROCHLORIDE 0.4 MG/ML
.1-.4 INJECTION, SOLUTION INTRAMUSCULAR; INTRAVENOUS; SUBCUTANEOUS
Status: DISCONTINUED | OUTPATIENT
Start: 2020-10-04 | End: 2020-10-05

## 2020-10-04 RX ORDER — METHYLERGONOVINE MALEATE 0.2 MG/ML
INJECTION INTRAVENOUS PRN
Status: DISCONTINUED | OUTPATIENT
Start: 2020-10-04 | End: 2020-10-05

## 2020-10-04 RX ORDER — KETOROLAC TROMETHAMINE 30 MG/ML
INJECTION, SOLUTION INTRAMUSCULAR; INTRAVENOUS PRN
Status: DISCONTINUED | OUTPATIENT
Start: 2020-10-04 | End: 2020-10-05

## 2020-10-04 RX ORDER — NALOXONE HYDROCHLORIDE 0.4 MG/ML
.1-.4 INJECTION, SOLUTION INTRAMUSCULAR; INTRAVENOUS; SUBCUTANEOUS
Status: CANCELLED | OUTPATIENT
Start: 2020-10-04

## 2020-10-04 RX ORDER — LIDOCAINE HCL/EPINEPHRINE/PF 2%-1:200K
VIAL (ML) INJECTION PRN
Status: DISCONTINUED | OUTPATIENT
Start: 2020-10-04 | End: 2020-10-05

## 2020-10-04 RX ORDER — CITRIC ACID/SODIUM CITRATE 334-500MG
SOLUTION, ORAL ORAL
Status: DISCONTINUED
Start: 2020-10-04 | End: 2020-10-04 | Stop reason: HOSPADM

## 2020-10-04 RX ORDER — CEFAZOLIN SODIUM 1 G/3ML
INJECTION, POWDER, FOR SOLUTION INTRAMUSCULAR; INTRAVENOUS PRN
Status: DISCONTINUED | OUTPATIENT
Start: 2020-10-04 | End: 2020-10-05

## 2020-10-04 RX ORDER — EPHEDRINE SULFATE 50 MG/ML
5 INJECTION, SOLUTION INTRAMUSCULAR; INTRAVENOUS; SUBCUTANEOUS
Status: DISCONTINUED | OUTPATIENT
Start: 2020-10-04 | End: 2020-10-05

## 2020-10-04 RX ORDER — FENTANYL CITRATE 50 UG/ML
25-50 INJECTION, SOLUTION INTRAMUSCULAR; INTRAVENOUS
Status: CANCELLED | OUTPATIENT
Start: 2020-10-04

## 2020-10-04 RX ORDER — SODIUM CHLORIDE, SODIUM LACTATE, POTASSIUM CHLORIDE, CALCIUM CHLORIDE 600; 310; 30; 20 MG/100ML; MG/100ML; MG/100ML; MG/100ML
INJECTION, SOLUTION INTRAVENOUS CONTINUOUS PRN
Status: DISCONTINUED | OUTPATIENT
Start: 2020-10-04 | End: 2020-10-05

## 2020-10-04 RX ORDER — FENTANYL CITRATE 50 UG/ML
INJECTION, SOLUTION INTRAMUSCULAR; INTRAVENOUS PRN
Status: DISCONTINUED | OUTPATIENT
Start: 2020-10-04 | End: 2020-10-05

## 2020-10-04 RX ORDER — CEFAZOLIN SODIUM 1 G/3ML
1 INJECTION, POWDER, FOR SOLUTION INTRAMUSCULAR; INTRAVENOUS SEE ADMIN INSTRUCTIONS
Status: CANCELLED | OUTPATIENT
Start: 2020-10-04

## 2020-10-04 RX ORDER — CEFAZOLIN SODIUM 2 G/100ML
2 INJECTION, SOLUTION INTRAVENOUS
Status: CANCELLED | OUTPATIENT
Start: 2020-10-04

## 2020-10-04 RX ORDER — AZITHROMYCIN 500 MG/5ML
500 INJECTION, POWDER, LYOPHILIZED, FOR SOLUTION INTRAVENOUS
Status: CANCELLED | OUTPATIENT
Start: 2020-10-04

## 2020-10-04 RX ORDER — SODIUM CHLORIDE, SODIUM LACTATE, POTASSIUM CHLORIDE, CALCIUM CHLORIDE 600; 310; 30; 20 MG/100ML; MG/100ML; MG/100ML; MG/100ML
INJECTION, SOLUTION INTRAVENOUS CONTINUOUS
Status: CANCELLED | OUTPATIENT
Start: 2020-10-04

## 2020-10-04 RX ORDER — CITRIC ACID/SODIUM CITRATE 334-500MG
30 SOLUTION, ORAL ORAL
Status: CANCELLED | OUTPATIENT
Start: 2020-10-04

## 2020-10-04 RX ORDER — ONDANSETRON 2 MG/ML
INJECTION INTRAMUSCULAR; INTRAVENOUS PRN
Status: DISCONTINUED | OUTPATIENT
Start: 2020-10-04 | End: 2020-10-05

## 2020-10-04 RX ORDER — AZITHROMYCIN 500 MG/5ML
500 INJECTION, POWDER, LYOPHILIZED, FOR SOLUTION INTRAVENOUS ONCE
Status: COMPLETED | OUTPATIENT
Start: 2020-10-04 | End: 2020-10-04

## 2020-10-04 RX ORDER — LIDOCAINE HYDROCHLORIDE AND EPINEPHRINE 15; 5 MG/ML; UG/ML
INJECTION, SOLUTION EPIDURAL PRN
Status: DISCONTINUED | OUTPATIENT
Start: 2020-10-04 | End: 2020-10-04

## 2020-10-04 RX ORDER — OXYTOCIN/0.9 % SODIUM CHLORIDE 30/500 ML
PLASTIC BAG, INJECTION (ML) INTRAVENOUS CONTINUOUS PRN
Status: DISCONTINUED | OUTPATIENT
Start: 2020-10-04 | End: 2020-10-05

## 2020-10-04 RX ORDER — NALBUPHINE HYDROCHLORIDE 20 MG/ML
2.5-5 INJECTION, SOLUTION INTRAMUSCULAR; INTRAVENOUS; SUBCUTANEOUS EVERY 6 HOURS PRN
Status: DISCONTINUED | OUTPATIENT
Start: 2020-10-04 | End: 2020-10-05

## 2020-10-04 RX ORDER — FENTANYL/BUPIVACAINE/NS/PF 2-1250MCG
PLASTIC BAG, INJECTION (ML) INJECTION
Status: COMPLETED
Start: 2020-10-04 | End: 2020-10-04

## 2020-10-04 RX ORDER — FENTANYL/BUPIVACAINE/NS/PF 2-1250MCG
PLASTIC BAG, INJECTION (ML) INJECTION CONTINUOUS PRN
Status: DISCONTINUED | OUTPATIENT
Start: 2020-10-04 | End: 2020-10-04

## 2020-10-04 RX ORDER — FLUMAZENIL 0.1 MG/ML
0.2 INJECTION, SOLUTION INTRAVENOUS
Status: CANCELLED | OUTPATIENT
Start: 2020-10-04

## 2020-10-04 RX ADMIN — Medication 2.5 MILLION UNITS: at 03:21

## 2020-10-04 RX ADMIN — KETOROLAC TROMETHAMINE 30 MG: 30 INJECTION, SOLUTION INTRAMUSCULAR at 23:54

## 2020-10-04 RX ADMIN — Medication 2.5 MILLION UNITS: at 07:15

## 2020-10-04 RX ADMIN — Medication 10 ML/HR: at 16:23

## 2020-10-04 RX ADMIN — FENTANYL CITRATE 25 MCG: 50 INJECTION, SOLUTION INTRAMUSCULAR; INTRAVENOUS at 23:36

## 2020-10-04 RX ADMIN — Medication 2.5 MILLION UNITS: at 11:04

## 2020-10-04 RX ADMIN — METHYLERGONOVINE MALEATE 200 MCG: 0.2 INJECTION INTRAMUSCULAR; INTRAVENOUS at 23:17

## 2020-10-04 RX ADMIN — OXYTOCIN-SODIUM CHLORIDE 0.9% IV SOLN 30 UNIT/500ML 600 ML/HR: 30-0.9/5 SOLUTION at 23:13

## 2020-10-04 RX ADMIN — Medication 10 ML/HR: at 16:19

## 2020-10-04 RX ADMIN — Medication 2.5 MILLION UNITS: at 15:10

## 2020-10-04 RX ADMIN — Medication 8 ML: at 16:26

## 2020-10-04 RX ADMIN — SODIUM CHLORIDE, POTASSIUM CHLORIDE, SODIUM LACTATE AND CALCIUM CHLORIDE 500 ML: 600; 310; 30; 20 INJECTION, SOLUTION INTRAVENOUS at 21:13

## 2020-10-04 RX ADMIN — LIDOCAINE HYDROCHLORIDE,EPINEPHRINE BITARTRATE 5 ML: 20; .005 INJECTION, SOLUTION EPIDURAL; INFILTRATION; INTRACAUDAL; PERINEURAL at 23:31

## 2020-10-04 RX ADMIN — SODIUM CHLORIDE, POTASSIUM CHLORIDE, SODIUM LACTATE AND CALCIUM CHLORIDE: 600; 310; 30; 20 INJECTION, SOLUTION INTRAVENOUS at 22:50

## 2020-10-04 RX ADMIN — LIDOCAINE HYDROCHLORIDE,EPINEPHRINE BITARTRATE 10 ML: 20; .005 INJECTION, SOLUTION EPIDURAL; INFILTRATION; INTRACAUDAL; PERINEURAL at 22:45

## 2020-10-04 RX ADMIN — Medication 2 MILLI-UNITS/MIN: at 03:23

## 2020-10-04 RX ADMIN — TRANEXAMIC ACID 1 G: 1 INJECTION, SOLUTION INTRAVENOUS at 23:20

## 2020-10-04 RX ADMIN — ONDANSETRON 4 MG: 2 INJECTION INTRAMUSCULAR; INTRAVENOUS at 23:36

## 2020-10-04 RX ADMIN — CEFAZOLIN 2 G: 1 INJECTION, POWDER, FOR SOLUTION INTRAMUSCULAR; INTRAVENOUS at 22:59

## 2020-10-04 RX ADMIN — Medication 2.5 MILLION UNITS: at 19:09

## 2020-10-04 RX ADMIN — LIDOCAINE HYDROCHLORIDE,EPINEPHRINE BITARTRATE 3 ML: 15; .005 INJECTION, SOLUTION EPIDURAL; INFILTRATION; INTRACAUDAL; PERINEURAL at 16:24

## 2020-10-04 RX ADMIN — Medication 500 MG: at 22:57

## 2020-10-04 RX ADMIN — SODIUM CHLORIDE, POTASSIUM CHLORIDE, SODIUM LACTATE AND CALCIUM CHLORIDE: 600; 310; 30; 20 INJECTION, SOLUTION INTRAVENOUS at 16:00

## 2020-10-04 NOTE — PROGRESS NOTES
FHT Review Note     Baseline 140, moderate variability, present accels, no decels  Throop: 4-5 in 10 minutes     Patient now on IV pitocin for augmentation. S/p cervical ripening with ruffin balloon and PV miso x1. Receiving IV PCN for GBS prophylaxis. Will plan to reassess cervix in 3-4 hours to allow time for fetal head to descend in pelvis, plan AROM when able. Anticipate .     Christie Stein MD  Obstetrics and Gynecology, PGY-3  10/4/2020 5:52 AM

## 2020-10-04 NOTE — PROGRESS NOTES
[facility] PROGRESS NOTE:   2020 2:03 AM         SUBJECTIVE:   Patient complains of contractions, moderate.    Contractions:  q 2 minutes  Leakage of fluid:  No  Vaginal bleeding:  No  Pain controlled:  Yes           OBJECTIVE:     Vitals:    10/03/20 1946 10/03/20 1947 10/03/20 1955 10/03/20 2259   BP: 103/69   106/74   Resp:  18  18   Temp:  98  F (36.7  C)  98.5  F (36.9  C)   TempSrc:  Oral  Oral   Weight:   69.9 kg (154 lb)    Height:   1.524 m (5')          NST:  Fetal Heart Rate Tracin bpm baseline, mod variability, + accels, no decels  Category 1    Tocometer: q 2 minutes    Gen: alert, oriented, no distress  Abdomen:  Gravid, nontender  Cervix:   Dilation: 5   Effacement: 50%   Station:-2   Consistency: soft   Position: Mid         LABS:     Recent Results (from the past 12 hour(s))   ABO/Rh type and screen    Collection Time: 10/03/20  9:40 PM   Result Value Ref Range    ABO O     RH(D) Pos     Antibody Screen Neg     Test Valid Only At          [facility]    Specimen Expires 10/06/2020    Hemoglobin    Collection Time: 10/03/20  9:40 PM   Result Value Ref Range    Hemoglobin 12.4 11.7 - 15.7 g/dL   Platelet count    Collection Time: 10/03/20  9:40 PM   Result Value Ref Range    Platelet Count 247 150 - 450 10e9/L              ASSESSMENT / PLAN:   40 year old  at 39w4d admitted for IOL.    Labor: s/p ruffin and 1 dose of vaginal miso. Contractions q2min. Continue expectant management. Augment with pitocin PRN  Fetal well being: Category 1 tracing.   GBS: pos, PCN for proph  Pain: expectant  Anticipate .    Laquita Ruiz MD

## 2020-10-04 NOTE — ANESTHESIA POSTPROCEDURE EVALUATION
Anesthesia POST Procedure Evaluation    Patient: Sailaja Aguirre   MRN:     [id number] Gender:   female   Age:    40 year old :      1980        Preoperative Diagnosis: * No pre-op diagnosis entered *   * No procedures listed *   Postop Comments: No value filed.     Anesthesia Type: No value filed.          Postop Pain Control: Uneventful            Sign Out: Well controlled pain   PONV: No   Neuro/Psych: Uneventful            Sign Out: Acceptable/Baseline neuro status   Airway/Respiratory: Uneventful            Sign Out: Acceptable/Baseline resp. status   CV/Hemodynamics: Uneventful            Sign Out: Acceptable CV status   Other NRE: NONE   DID A NON-ROUTINE EVENT OCCUR? No         Last Anesthesia Record Vitals:      Last PACU Vitals:  Vitals Value Taken Time   /69 10/04/20 1655   Temp     Pulse     Resp     SpO2 96 % 10/04/20 1706   Temp src     NIBP     Pulse     SpO2     Resp     Temp     Ht Rate     Temp 2     Vitals shown include unvalidated device data.      Electronically Signed By: Kateryna Clemons MD, 2020, 5:08 PM

## 2020-10-04 NOTE — H&P
[facility]  OB History and Physical      Sailaja Aguirre MRN# [id number]   Age: 40 year old YOB: 1980     CC:  Induction of labor    HPI:  Ms. Sailaja Aguirre is a 40 year old  at 39w3d by LMP consistent with 13 week ultrasound, who presents for induction of labor for polyhydramnios, advanced maternal age.  She reports irregular mild contractions. No vaginal bleeding, and loss of fluid.   + normal fetal movement.    Pregnancy Complications:  -  Advanced maternal age  -  Polyhydramnios, last MVP 9.2cm, JENIFER 18.9cm    Prenatal Labs:   Lab Results   Component Value Date    ABO O 2020    RH Pos 2020    AS Neg 2020    HEPBANG Nonreactive 2020    TREPAB Negative 2017    HGB 12.1 2020       GBS Status:   Lab Results   Component Value Date    GBS Positive (A) 2020       Ultrasounds  1. 2020: cephalic, BPP 8/8, JENIFER wnl  2. 2020: cephalic, oblique, polyhydramnios MVP 10.7cm, JENIFER 27cm. EFW 7lb 13oz (3550g), 76%    OB History  OB History    Para Term  AB Living   4 2 2 0 1 2   SAB TAB Ectopic Multiple Live Births   0 0 0 0 2      # Outcome Date GA Lbr Ronnie/2nd Weight Sex Delivery Anes PTL Lv   4 Current            3 AB 09/15/17 7w3d    AB, MISSED      2 Term 13 40w0d  3.402 kg (7 lb 8 oz) F  None N SHANTHI   1 Term 06 40w0d  2.892 kg (6 lb 6 oz) F  EPI N SHANTHI       PMHx:   Past Medical History:   Diagnosis Date     Abnormal Pap smear of cervix 2020    see problem list     PSHx:   Past Surgical History:   Procedure Laterality Date     DILATION AND CURETTAGE SUCTION N/A 9/15/2017    Procedure: DILATION AND CURETTAGE SUCTION;  Suction Dilation and Curettage ;  Surgeon: Gris Huerta MD;  Location: [facility] OR     HERNIA REPAIR      AGE 8 YEARS OLD     Meds:   Medications Prior to Admission   Medication Sig Dispense Refill Last Dose     Prenatal Vit-Fe Fumarate-FA (PRENATAL MULTIVITAMIN W/IRON) 27-0.8 MG  tablet Take 1 tablet by mouth daily 90 tablet 3 10/3/2020 at Unknown time     Prenatal Vit-Fe Fumarate-FA (PRENATAL VITAMIN AND MINERAL) 28-0.8 MG TABS Take 1 tablet by mouth daily 100 tablet 3 10/3/2020 at Unknown time     senna (SENOKOT) 8.6 MG tablet Take 2 tablets by mouth 2 times daily as needed for constipation 120 tablet 0 10/3/2020 at 0800     senna-docusate (SENOKOT-S/PERICOLACE) 8.6-50 MG tablet Take 1 tablet by mouth daily Start after delivery. 100 tablet 0 10/3/2020 at Unknown time     senna-docusate (SENOKOT-S/PERICOLACE) 8.6-50 MG tablet Take 1 tablet by mouth daily Start after delivery. 100 tablet 0 10/3/2020 at Unknown time     acetaminophen (TYLENOL) 325 MG tablet Take 2 tablets (650 mg) by mouth every 6 hours as needed for mild pain Start after Delivery. 100 tablet 0 Unknown at Unknown time     acetaminophen (TYLENOL) 325 MG tablet Take 2 tablets (650 mg) by mouth every 6 hours as needed for mild pain Start after Delivery. 100 tablet 0 Unknown at Unknown time     hydroquinone (CLOTILDE) 4 % external cream Apply topically to the entire face BID for up to 12 weeks, then discontinue 56.8 g 0 Unknown at Unknown time     ibuprofen (ADVIL/MOTRIN) 600 MG tablet Take 1 tablet (600 mg) by mouth every 6 hours as needed for moderate pain Start after delivery 60 tablet 0 Unknown at Unknown time     ibuprofen (ADVIL/MOTRIN) 600 MG tablet Take 1 tablet (600 mg) by mouth every 6 hours as needed for moderate pain Start after delivery 60 tablet 0 Unknown at Unknown time     Allergies:  No Known Allergies   FmHx:   Family History   Problem Relation Age of Onset     Family History Negative Mother      SocHx: She denies any tobacco, alcohol, or other drug use during this pregnancy.    ROS:   Complete 10-point ROS negative except as noted in HPI.She denies headache, blurry vision, chest pain, shortness of breath, RUQ pain, nausea, vomiting, dysuria, hematuria or extremity edema.    PE:  Vit:   Patient Vitals for the  past 4 hrs:   BP Temp Temp src Resp Height Weight   10/03/20 1955 -- -- -- -- 1.524 m (5') 69.9 kg (154 lb)   10/03/20 1947 -- 98  F (36.7  C) Oral 18 -- --   10/03/20 1946 103/69 -- -- -- -- --      Gen: Well-appearing, NAD, comfortable   CV: rrr, no mrg   Pulm: Ctab, no wheezes or crackles   Abd: Soft, gravid, non-tender, protuberant abdomen  Ext: trace LE edema b/l  Cx: /high    Pres:  vtx by BSUS, occiput posterior, fetal body is slightly oblique  EFW:  8.5# by Leopold's  Memb: intact             I placed cervical Ruffin bulb with 75mL of sterile water  Vaginal misoprostol 25mcg placed by me    FHT: Baseline 130, mod variability, + accelerations, no decelerations   [place]: 1 contractions in 10 minutes      Assessment  Ms. Sailaja Aguirre is a 40 year old , at 39w3d by LMP consistent with 13w ultrasound presents for IOL for polyhydramnios and AMA.    Plan  Admit to L&D  Induction of Labor  - Admit for IOL in the setting of polyhydramnios, AMA  - Cervix: unfavorable   - Given above Bishops score, will ripen cervix with start vaginal misoprostol and ruffin bulb  - Membranes: intact. Discussed risk of cord prolapse with AROM given high fluid  - Labs: CBC, T&S, RPR  - GBS positive; Antibiotics indicated, will give after ruffin bulb comes out.  - Pain Control: Per patient request; Discussed options    - will offer Vistaril and IM Morphine for therapeutic sleep   - Desires no pain meds in active labor.    FWB: Category 1 FHT.  Continue EFM and toco  - GBS positive  - EFW 8.5, vtx by BSUS, placenta posterior    PNC: Rh positive, Rubella immune, 1h GCT elevated (141), all 3h values wnl, anatomy level 2 US wnl    Laquita Ruiz MD

## 2020-10-04 NOTE — PLAN OF CARE
Pt is an IOL for polyhydramnios and AMA. VSS. [employer] balloon placed at 2115 and was pulled out at 2315. One dose of vaginal miso placed at 2115. Pitocin started at 0320. Currently infusing at 4 mu/min. Pt dora every 2-3 minutes, moderate to palpation, soft resting tone. FHR baseline 135, moderate variability, accels present, soft resting tone. Last SVE at 0155 was 5/50/-2. Pt managing contraction pain with repositioning, using the birthing ball and peanut ball, ambulation in the room and encouragement from her  and . Membranes remain intact, bloody show present. Penicillin doses x2 for GBS prophylaxis.

## 2020-10-04 NOTE — PROGRESS NOTES
S; wondering when labor will kick in    O: /72   Temp 98  F (36.7  C) (Oral)   Resp 18   Ht 1.524 m (5')   Wt 69.9 kg (154 lb)   LMP 01/01/2020 (LMP Unknown)   BMI 30.08 kg/m      FHT: 140, +accels, -decels, mod juliana  [place]; Q2-3min    SVE: 5.5/60/-1, head well applied and AROM with copious amount of clear fluid.  No cord palpable post AROM per resident.    A/P: IUP at 39w4d, IOL.   FWBR, cat 1 tracing   Cont pit IOL    CARLEY BLEDSOE MD

## 2020-10-04 NOTE — PROGRESS NOTES
[facility] PROGRESS NOTE:   2020 6:32 AM         SUBJECTIVE:   Patient sleeping.           OBJECTIVE:     Vitals:    10/03/20 1947 10/03/20 1955 10/03/20 2259 10/04/20 0326   BP:   106/74 109/71   Resp: 18  18 18   Temp: 98  F (36.7  C)  98.5  F (36.9  C) 98.5  F (36.9  C)   TempSrc: Oral  Oral Oral   Weight:  69.9 kg (154 lb)     Height:  1.524 m (5')         NST:  Fetal Heart Rate Tracin bpm baseline, mod variability, + accels, no decels  Category 1    Tocometer: q 2-3 minutes    Gen: alert, oriented, no distress, resting  Abdomen:  Gravid, nontender  Cervix: deferred    Pitocin: 4mu/min  Penicillin: 2310, 0321         LABS:     Recent Results (from the past 12 hour(s))   ABO/Rh type and screen    Collection Time: 10/03/20  9:40 PM   Result Value Ref Range    ABO O     RH(D) Pos     Antibody Screen Neg     Test Valid Only At          [facility]    Specimen Expires 10/06/2020    Hemoglobin    Collection Time: 10/03/20  9:40 PM   Result Value Ref Range    Hemoglobin 12.4 11.7 - 15.7 g/dL   Platelet count    Collection Time: 10/03/20  9:40 PM   Result Value Ref Range    Platelet Count 247 150 - 450 10e9/L              ASSESSMENT / PLAN:   40 year old  at 39w4d admitted for IOL - Polyhydramnios and AMA.    Labor: continue pitocin augmentation. Consider AROM at next check if head well applied.   Fetal well being: Category 1 tracing.   GBS: PCN - adequately treated  Pain: expectant,  here for support  Anticipate .    Laquita Ruiz MD

## 2020-10-04 NOTE — ANESTHESIA PROCEDURE NOTES
Epidural Procedure Note      Staff -   Anesthesiologist:  Kateryna Falk MD  Performed By: anesthesiologist    Location: OB     Procedure start time:  10/4/2020 4:27 PM   Pre-procedure checklist:   patient identified, IV checked, site marked, risks and benefits discussed, informed consent, monitors and equipment checked, pre-op evaluation, at physician/surgeon's request and post-op pain management      Correct Patient: Yes      Correct Position: Yes      Correct Site: Yes      Correct Procedure: Yes      Correct Laterality:  Yes    Site Marked:  Yes  Procedure:     Procedure:  Epidural catheter    ASA:  2    Position:  Sitting    Sterile Prep: chloraprep      Insertion site:  L4-5    Local skin infiltration:  1% lidocaine    amount (mL):  3    Approach:  Midline    Needle gauge (G):  18    Needle Length (in):  3.5    Block Needle Type:  Touhy    Injection Technique:  LORT saline    Attempts:  1    Redirects:  0    Catheter gauge (G):  20    Catheter threaded easily: Yes      Threaded to cm at skin:  10    Threaded in epidural space (cm):  4    Paresthesias:  No     Local anesthetic:  Lidocaine 1.5% w/ 1:200,000 epinephrine    Test dose time:  04:22  Assessment/Narrative:      Easily placed

## 2020-10-04 NOTE — PROGRESS NOTES
S; uncomfortable, using NO    O: /77   Temp 98.1  F (36.7  C) (Oral)   Resp 18   Ht 1.524 m (5')   Wt 69.9 kg (154 lb)   LMP 01/01/2020 (LMP Unknown)   BMI 30.08 kg/m      FHT: 145, +accels, -decels, mod juliana  [place]: Q2-3min    SVE: 8/90/-1, IUPC placed    Pit: 5mu/min    A/P: IUP at 39w4d, IOL   FWBR, cat1 tracing   Very slow cervical change.  IUPC paced and will assess ctx adequacy.  Increase pitocin as appropriate    CARLEY BLEDSOE MD

## 2020-10-04 NOTE — PROGRESS NOTES
[facility]  Strip Review Note    O:   Patient Vitals for the past 4 hrs:   BP Temp Temp src Resp   10/03/20 2259 106/74 98.5  F (36.9  C) Oral 18       FHT: Baseline 130, mod variability, present accelerations, no decelerations  [place]: 4 contractions in 10 minutes    A/P:  Ms. Sailaja Aguirre is a 40 year old  at 39w3d by LMP c/w 13w US, here for IOL for polyhydramnios and AMA.    # IOL:   - S/p ruffin balloon, 1 dose PV misoprostol  - GBS positive, now on PCN   - Plan to start IV pitocin if contractions space   - Anticipate     # FWB: Category I FHT, reactive  - GBS pos, on PCN  - EFW 8.5, vtx by BSUS, placenta posterior    Christie Stein MD  OB/GYN Resident, PGY-3  10/3/2020 11:56 PM

## 2020-10-04 NOTE — PROGRESS NOTES
Labor progress note    S: Patient very uncomfortable but not feeling pressure.     O:  Patient Vitals for the past 24 hrs:   BP Temp Temp src Resp Height Weight   10/04/20 1210 127/77 98.6  F (37  C) Oral -- -- --   10/04/20 1100 -- 98.4  F (36.9  C) Oral -- -- --   10/04/20 1000 -- 97.9  F (36.6  C) Oral -- -- --   10/04/20 0900 108/70 98  F (36.7  C) Oral 18 -- --   10/04/20 0735 109/72 98  F (36.7  C) Oral 18 -- --   10/04/20 0326 109/71 98.5  F (36.9  C) Oral 18 -- --   10/03/20 2259 106/74 98.5  F (36.9  C) Oral 18 -- --   10/03/20 1955 -- -- -- -- 1.524 m (5') 69.9 kg (154 lb)   10/03/20 1947 -- 98  F (36.7  C) Oral 18 -- --   10/03/20 1946 103/69 -- -- -- -- --   Gen: uncomfortable  SVE: /-2    FHT baseline 150, mod juliana, accels present, no decels  [place]: q2-3 minutes    A/P:  Ms. Sailaja Aguirre is a 40 year old  at 39w4d by LMP c/w 13w US, here for IOL for polyhydramnios and AMA.     # IOL:   - S/p ruffin balloon, PV misoprostol, AROM, on pitocin at 5mU/min  - GBS positive, adequate on PCN   - Pain: considering epidural   - Anticipate      # FWB: Category I FHT, reactive  - GBS pos, on PCN  - EFW 8.5, vtx by BSUS, placenta posterior    Kira Mccann MD  OB/GYN PGY-3  10/04/20 1:19 PM

## 2020-10-04 NOTE — ANESTHESIA PREPROCEDURE EVALUATION
Anesthesia Pre-Procedure Evaluation    Patient: Sailaja Aguirre   MRN:     [id number] Gender:   female   Age:    40 year old :      1980        Preoperative Diagnosis: * No surgery found *        LABS:  CBC:   Lab Results   Component Value Date    WBC 5.0 2020    WBC 5.6 2020    HGB 12.4 10/03/2020    HGB 12.1 2020    HCT 35.1 2020    HCT 35.3 2020     10/03/2020     2020     BMP:   Lab Results   Component Value Date     2019     2019    POTASSIUM 3.9 2019    POTASSIUM 3.8 2019    CHLORIDE 106 2019    CHLORIDE 105 2019    CO2 27 2019    CO2 26 2019    BUN 11 2019    BUN 8 2019    CR 0.55 2019    CR 0.55 2019    GLC 86 2019    GLC 79 2019     COAGS: No results found for: PTT, INR, FIBR  POC:   Lab Results   Component Value Date    HCG Negative 2019     OTHER:   Lab Results   Component Value Date    LACT 0.6 (L) 2019    DOMINGO 8.3 (L) 2019    ALBUMIN 3.6 2019    PROTTOTAL 6.7 (L) 2019    ALT 23 2019    AST 14 2019    ALKPHOS 46 2019    BILITOTAL 0.6 2019    LIPASE 104 2019    CRP <2.9 2019    SED 10 2019        Preop Vitals    BP Readings from Last 3 Encounters:   10/04/20 127/77   20 99/65   20 105/71    Pulse Readings from Last 3 Encounters:   20 87   20 80   20 102      Resp Readings from Last 3 Encounters:   10/04/20 18   20 18   19 18    SpO2 Readings from Last 3 Encounters:   20 97%   19 98%   19 100%      Temp Readings from Last 1 Encounters:   10/04/20 36.7  C (98.1  F) (Oral)    Ht Readings from Last 1 Encounters:   10/03/20 1.524 m (5')      Wt Readings from Last 1 Encounters:   10/03/20 69.9 kg (154 lb)    Estimated body mass index is 30.08 kg/m  as calculated from the following:    Height as of this encounter: 1.524 m (5').     Weight as of this encounter: 69.9 kg (154 lb).     LDA:  Peripheral IV 10/03/20 Right Lower forearm (Active)   Site Assessment WDL 10/03/20 2140   Line Status Saline locked 10/03/20 2140   Phlebitis Scale 1-->erythema at access site with or without pain 10/03/20 2140   Infiltration Scale 0 10/03/20 2140   Number of days: 1        Past Medical History:   Diagnosis Date     Abnormal Pap smear of cervix 04/02/2020    see problem list      Past Surgical History:   Procedure Laterality Date     DILATION AND CURETTAGE SUCTION N/A 9/15/2017    Procedure: DILATION AND CURETTAGE SUCTION;  Suction Dilation and Curettage ;  Surgeon: Gris Huerta MD;  Location: [facility] OR     HERNIA REPAIR      AGE 8 YEARS OLD      No Known Allergies     Anesthesia Evaluation     .             ROS/MED HX    ENT/Pulmonary:  - neg pulmonary ROS     Neurologic:  - neg neurologic ROS     Cardiovascular:  - neg cardiovascular ROS       METS/Exercise Tolerance:     Hematologic:         Musculoskeletal:         GI/Hepatic:  - neg GI/hepatic ROS       Renal/Genitourinary:         Endo:         Psychiatric:         Infectious Disease:         Malignancy:         Other:                     JZG FV AN PHYSICAL EXAM    JZG FV AN PLAN NO PONV RULE    neg OB ROS             Kateryna Clemons MD

## 2020-10-04 NOTE — PROGRESS NOTES
Pt arrived for planned induction of labor for polyhydramnios, large baby, AMA. Patient allowed to shower and then admission initiated. External monitors applied. Fetal monitoring reveals category I strip. Pt dora intermittently, reports feeling tightening and some cramping. BSUS reveals cephalic OP fetal position. SVE by Dr. Ruiz 2/30/high at 2050. Plan made for ruffin bulb and vaginal misoprostol placement. Penicillin to be administered for GBS prophylaxis when patient more uncomfortable. PIV placed. Pt agreeable to plan.

## 2020-10-04 NOTE — PROGRESS NOTES
S; much much more comfortable with epidural, resting/sleeping in bed.     O: /62   Temp 98.1  F (36.7  C) (Oral)   Resp 18   Ht 1.524 m (5')   Wt 69.9 kg (154 lb)   LMP 01/01/2020 (LMP Unknown)   SpO2 97%   BMI 30.08 kg/m      FHT: 145, +accels, -decels, mod juliana  [place]: Q2-3min    SVE: deferred, repeat in 30 minutes. Last was 8/90/-1 at 1600.     Pit: 8mu/min    A/P: IUP at 39w4d, IOL   FWBR, cat1 tracing   Very slow cervical change during labor course. Increasing pitocin as able. Now with IUPC to asess contractions, so far inadequate.     Kira Mccann MD  OB/GYN PGY-3  10/04/20 5:06 PM

## 2020-10-04 NOTE — PLAN OF CARE
Dr. Joaquín Clemons anesthesiologist at bedside for epidural placement. Patient and procedure correctly identified/verified with anesthesiologist . Consent signed. 1000 fluid bolus given. Patient in position for epidural placement. Epidural placed without complications. Test dose/bolus given by anesthesiologist and patient tolerated well. Patient states her pain is gone after epidural.

## 2020-10-05 PROBLEM — Z98.891 S/P CESAREAN SECTION: Status: ACTIVE | Noted: 2020-10-05

## 2020-10-05 LAB — HGB BLD-MCNC: 10.9 G/DL (ref 11.7–15.7)

## 2020-10-05 PROCEDURE — 250N000011 HC RX IP 250 OP 636: Performed by: STUDENT IN AN ORGANIZED HEALTH CARE EDUCATION/TRAINING PROGRAM

## 2020-10-05 PROCEDURE — 250N000013 HC RX MED GY IP 250 OP 250 PS 637: Performed by: STUDENT IN AN ORGANIZED HEALTH CARE EDUCATION/TRAINING PROGRAM

## 2020-10-05 PROCEDURE — C9290 INJ, BUPIVACAINE LIPOSOME: HCPCS | Performed by: STUDENT IN AN ORGANIZED HEALTH CARE EDUCATION/TRAINING PROGRAM

## 2020-10-05 PROCEDURE — 250N000011 HC RX IP 250 OP 636: Performed by: NURSE ANESTHETIST, CERTIFIED REGISTERED

## 2020-10-05 PROCEDURE — 250N000009 HC RX 250: Performed by: STUDENT IN AN ORGANIZED HEALTH CARE EDUCATION/TRAINING PROGRAM

## 2020-10-05 PROCEDURE — 120N000002 HC R&B MED SURG/OB UMMC

## 2020-10-05 PROCEDURE — 36415 COLL VENOUS BLD VENIPUNCTURE: CPT | Performed by: STUDENT IN AN ORGANIZED HEALTH CARE EDUCATION/TRAINING PROGRAM

## 2020-10-05 PROCEDURE — 85018 HEMOGLOBIN: CPT | Performed by: STUDENT IN AN ORGANIZED HEALTH CARE EDUCATION/TRAINING PROGRAM

## 2020-10-05 RX ORDER — NALOXONE HYDROCHLORIDE 0.4 MG/ML
.1-.4 INJECTION, SOLUTION INTRAMUSCULAR; INTRAVENOUS; SUBCUTANEOUS
Status: DISCONTINUED | OUTPATIENT
Start: 2020-10-05 | End: 2020-10-07 | Stop reason: HOSPADM

## 2020-10-05 RX ORDER — BISACODYL 10 MG
10 SUPPOSITORY, RECTAL RECTAL DAILY PRN
Status: DISCONTINUED | OUTPATIENT
Start: 2020-10-07 | End: 2020-10-07 | Stop reason: HOSPADM

## 2020-10-05 RX ORDER — OXYCODONE HYDROCHLORIDE 5 MG/1
5 TABLET ORAL EVERY 4 HOURS PRN
Status: DISCONTINUED | OUTPATIENT
Start: 2020-10-05 | End: 2020-10-05

## 2020-10-05 RX ORDER — MEPERIDINE HYDROCHLORIDE 25 MG/ML
12.5 INJECTION INTRAMUSCULAR; INTRAVENOUS; SUBCUTANEOUS
Status: DISCONTINUED | OUTPATIENT
Start: 2020-10-05 | End: 2020-10-05

## 2020-10-05 RX ORDER — KETOROLAC TROMETHAMINE 30 MG/ML
30 INJECTION, SOLUTION INTRAMUSCULAR; INTRAVENOUS EVERY 6 HOURS
Status: COMPLETED | OUTPATIENT
Start: 2020-10-05 | End: 2020-10-06

## 2020-10-05 RX ORDER — OXYTOCIN/0.9 % SODIUM CHLORIDE 30/500 ML
340 PLASTIC BAG, INJECTION (ML) INTRAVENOUS CONTINUOUS PRN
Status: DISCONTINUED | OUTPATIENT
Start: 2020-10-05 | End: 2020-10-07 | Stop reason: HOSPADM

## 2020-10-05 RX ORDER — AMOXICILLIN 250 MG
2 CAPSULE ORAL 2 TIMES DAILY
Status: DISCONTINUED | OUTPATIENT
Start: 2020-10-05 | End: 2020-10-07 | Stop reason: HOSPADM

## 2020-10-05 RX ORDER — OXYTOCIN 10 [USP'U]/ML
10 INJECTION, SOLUTION INTRAMUSCULAR; INTRAVENOUS
Status: DISCONTINUED | OUTPATIENT
Start: 2020-10-05 | End: 2020-10-07 | Stop reason: HOSPADM

## 2020-10-05 RX ORDER — NALOXONE HYDROCHLORIDE 0.4 MG/ML
.1-.4 INJECTION, SOLUTION INTRAMUSCULAR; INTRAVENOUS; SUBCUTANEOUS
Status: DISCONTINUED | OUTPATIENT
Start: 2020-10-05 | End: 2020-10-05

## 2020-10-05 RX ORDER — HYDROMORPHONE HYDROCHLORIDE 1 MG/ML
.3-.5 INJECTION, SOLUTION INTRAMUSCULAR; INTRAVENOUS; SUBCUTANEOUS EVERY 10 MIN PRN
Status: DISCONTINUED | OUTPATIENT
Start: 2020-10-05 | End: 2020-10-05

## 2020-10-05 RX ORDER — ACETAMINOPHEN 325 MG/1
975 TABLET ORAL EVERY 6 HOURS
Status: DISCONTINUED | OUTPATIENT
Start: 2020-10-05 | End: 2020-10-07 | Stop reason: HOSPADM

## 2020-10-05 RX ORDER — HYDROCORTISONE 2.5 %
CREAM (GRAM) TOPICAL 3 TIMES DAILY PRN
Status: DISCONTINUED | OUTPATIENT
Start: 2020-10-05 | End: 2020-10-07 | Stop reason: HOSPADM

## 2020-10-05 RX ORDER — METHYLERGONOVINE MALEATE 0.2 MG/ML
200 INJECTION INTRAVENOUS
Status: DISCONTINUED | OUTPATIENT
Start: 2020-10-05 | End: 2020-10-07 | Stop reason: HOSPADM

## 2020-10-05 RX ORDER — MODIFIED LANOLIN
OINTMENT (GRAM) TOPICAL
Status: DISCONTINUED | OUTPATIENT
Start: 2020-10-05 | End: 2020-10-07 | Stop reason: HOSPADM

## 2020-10-05 RX ORDER — MORPHINE SULFATE 1 MG/ML
INJECTION, SOLUTION EPIDURAL; INTRATHECAL; INTRAVENOUS PRN
Status: DISCONTINUED | OUTPATIENT
Start: 2020-10-05 | End: 2020-10-05

## 2020-10-05 RX ORDER — DEXTROSE, SODIUM CHLORIDE, SODIUM LACTATE, POTASSIUM CHLORIDE, AND CALCIUM CHLORIDE 5; .6; .31; .03; .02 G/100ML; G/100ML; G/100ML; G/100ML; G/100ML
INJECTION, SOLUTION INTRAVENOUS CONTINUOUS
Status: DISCONTINUED | OUTPATIENT
Start: 2020-10-05 | End: 2020-10-07 | Stop reason: HOSPADM

## 2020-10-05 RX ORDER — IBUPROFEN 800 MG/1
800 TABLET, FILM COATED ORAL EVERY 6 HOURS PRN
Status: DISCONTINUED | OUTPATIENT
Start: 2020-10-05 | End: 2020-10-05

## 2020-10-05 RX ORDER — LIDOCAINE 40 MG/G
CREAM TOPICAL
Status: DISCONTINUED | OUTPATIENT
Start: 2020-10-05 | End: 2020-10-07 | Stop reason: HOSPADM

## 2020-10-05 RX ORDER — CARBOPROST TROMETHAMINE 250 UG/ML
250 INJECTION, SOLUTION INTRAMUSCULAR
Status: DISCONTINUED | OUTPATIENT
Start: 2020-10-05 | End: 2020-10-07 | Stop reason: HOSPADM

## 2020-10-05 RX ORDER — AMOXICILLIN 250 MG
1 CAPSULE ORAL 2 TIMES DAILY
Status: DISCONTINUED | OUTPATIENT
Start: 2020-10-05 | End: 2020-10-07 | Stop reason: HOSPADM

## 2020-10-05 RX ORDER — OXYTOCIN/0.9 % SODIUM CHLORIDE 30/500 ML
100 PLASTIC BAG, INJECTION (ML) INTRAVENOUS CONTINUOUS
Status: DISCONTINUED | OUTPATIENT
Start: 2020-10-05 | End: 2020-10-07 | Stop reason: HOSPADM

## 2020-10-05 RX ORDER — SIMETHICONE 80 MG
80 TABLET,CHEWABLE ORAL 4 TIMES DAILY PRN
Status: DISCONTINUED | OUTPATIENT
Start: 2020-10-05 | End: 2020-10-07 | Stop reason: HOSPADM

## 2020-10-05 RX ORDER — DIPHENHYDRAMINE HYDROCHLORIDE 50 MG/ML
25 INJECTION INTRAMUSCULAR; INTRAVENOUS EVERY 6 HOURS PRN
Status: DISCONTINUED | OUTPATIENT
Start: 2020-10-05 | End: 2020-10-07 | Stop reason: HOSPADM

## 2020-10-05 RX ORDER — ACETAMINOPHEN 325 MG/1
650 TABLET ORAL EVERY 4 HOURS PRN
Status: DISCONTINUED | OUTPATIENT
Start: 2020-10-08 | End: 2020-10-07 | Stop reason: HOSPADM

## 2020-10-05 RX ORDER — OXYCODONE HYDROCHLORIDE 5 MG/1
5 TABLET ORAL EVERY 4 HOURS PRN
Status: DISCONTINUED | OUTPATIENT
Start: 2020-10-05 | End: 2020-10-07 | Stop reason: HOSPADM

## 2020-10-05 RX ORDER — IBUPROFEN 800 MG/1
800 TABLET, FILM COATED ORAL EVERY 6 HOURS PRN
Status: DISCONTINUED | OUTPATIENT
Start: 2020-10-06 | End: 2020-10-07 | Stop reason: HOSPADM

## 2020-10-05 RX ORDER — FENTANYL CITRATE 50 UG/ML
25-50 INJECTION, SOLUTION INTRAMUSCULAR; INTRAVENOUS
Status: DISCONTINUED | OUTPATIENT
Start: 2020-10-05 | End: 2020-10-05 | Stop reason: HOSPADM

## 2020-10-05 RX ORDER — ONDANSETRON 4 MG/1
4 TABLET, ORALLY DISINTEGRATING ORAL EVERY 30 MIN PRN
Status: DISCONTINUED | OUTPATIENT
Start: 2020-10-05 | End: 2020-10-05

## 2020-10-05 RX ORDER — MISOPROSTOL 200 UG/1
800 TABLET ORAL
Status: DISCONTINUED | OUTPATIENT
Start: 2020-10-05 | End: 2020-10-07 | Stop reason: HOSPADM

## 2020-10-05 RX ORDER — BUPIVACAINE HYDROCHLORIDE 2.5 MG/ML
INJECTION, SOLUTION EPIDURAL; INFILTRATION; INTRACAUDAL PRN
Status: DISCONTINUED | OUTPATIENT
Start: 2020-10-05 | End: 2020-10-05

## 2020-10-05 RX ORDER — DIPHENHYDRAMINE HCL 25 MG
25 CAPSULE ORAL EVERY 6 HOURS PRN
Status: DISCONTINUED | OUTPATIENT
Start: 2020-10-05 | End: 2020-10-07 | Stop reason: HOSPADM

## 2020-10-05 RX ORDER — SODIUM CHLORIDE, SODIUM LACTATE, POTASSIUM CHLORIDE, CALCIUM CHLORIDE 600; 310; 30; 20 MG/100ML; MG/100ML; MG/100ML; MG/100ML
INJECTION, SOLUTION INTRAVENOUS CONTINUOUS
Status: DISCONTINUED | OUTPATIENT
Start: 2020-10-05 | End: 2020-10-07 | Stop reason: HOSPADM

## 2020-10-05 RX ORDER — ONDANSETRON 2 MG/ML
4 INJECTION INTRAMUSCULAR; INTRAVENOUS EVERY 6 HOURS PRN
Status: DISCONTINUED | OUTPATIENT
Start: 2020-10-05 | End: 2020-10-07 | Stop reason: HOSPADM

## 2020-10-05 RX ORDER — ONDANSETRON 2 MG/ML
4 INJECTION INTRAMUSCULAR; INTRAVENOUS EVERY 30 MIN PRN
Status: DISCONTINUED | OUTPATIENT
Start: 2020-10-05 | End: 2020-10-05

## 2020-10-05 RX ADMIN — ACETAMINOPHEN 975 MG: 325 TABLET, FILM COATED ORAL at 09:11

## 2020-10-05 RX ADMIN — DOCUSATE SODIUM 50 MG AND SENNOSIDES 8.6 MG 1 TABLET: 8.6; 5 TABLET, FILM COATED ORAL at 09:12

## 2020-10-05 RX ADMIN — KETOROLAC TROMETHAMINE 30 MG: 30 INJECTION, SOLUTION INTRAMUSCULAR at 12:26

## 2020-10-05 RX ADMIN — Medication 100 ML/HR: at 01:40

## 2020-10-05 RX ADMIN — BUPIVACAINE 20 ML: 13.3 INJECTION, SUSPENSION, LIPOSOMAL INFILTRATION at 00:05

## 2020-10-05 RX ADMIN — MORPHINE SULFATE 2 MG: 1 INJECTION, SOLUTION EPIDURAL; INTRATHECAL; INTRAVENOUS at 00:02

## 2020-10-05 RX ADMIN — KETOROLAC TROMETHAMINE 30 MG: 30 INJECTION, SOLUTION INTRAMUSCULAR at 06:30

## 2020-10-05 RX ADMIN — ACETAMINOPHEN 975 MG: 325 TABLET, FILM COATED ORAL at 03:07

## 2020-10-05 RX ADMIN — ACETAMINOPHEN 975 MG: 325 TABLET, FILM COATED ORAL at 16:33

## 2020-10-05 RX ADMIN — Medication 100 ML/HR: at 00:10

## 2020-10-05 RX ADMIN — KETOROLAC TROMETHAMINE 30 MG: 30 INJECTION, SOLUTION INTRAMUSCULAR at 19:18

## 2020-10-05 RX ADMIN — DOCUSATE SODIUM 50 MG AND SENNOSIDES 8.6 MG 1 TABLET: 8.6; 5 TABLET, FILM COATED ORAL at 19:16

## 2020-10-05 RX ADMIN — BUPIVACAINE HYDROCHLORIDE 20 ML: 2.5 INJECTION, SOLUTION EPIDURAL; INFILTRATION; INTRACAUDAL at 00:05

## 2020-10-05 NOTE — PROGRESS NOTES
Labor progress note    Late entry due to patient care.    S: Patient comfortable with epidural, not really feeling pressure or urge to push.    O:  Patient Vitals for the past 24 hrs:   BP Temp Temp src Pulse Resp SpO2   10/05/20 0224 108/74 100  F (37.8  C) Oral 80 16 --   10/05/20 0200 95/64 99.5  F (37.5  C) Oral 87 17 97 %   10/05/20 0145 98/68 -- -- 94 15 96 %   10/05/20 0130 112/72 -- -- 96 18 98 %   10/05/20 0115 108/71 -- -- 86 19 98 %   10/05/20 0100 105/79 -- -- 91 17 99 %   10/05/20 0045 102/69 -- -- 89 23 100 %   10/05/20 0030 108/67 -- -- 89 22 100 %   10/05/20 0015 117/70 99.2  F (37.3  C) Oral 79 22 --   10/04/20 2100 126/66 -- -- -- -- 94 %   10/04/20 2040 -- 99  F (37.2  C) Oral -- -- --   10/04/20 2030 113/59 -- -- -- -- 96 %   10/04/20 1953 123/62 99.2  F (37.3  C) Oral -- -- 98 %   10/04/20 1900 115/70 100  F (37.8  C) -- -- -- 93 %   10/04/20 1845 -- 99.3  F (37.4  C) Axillary -- -- 93 %   10/04/20 1830 90/50 -- -- -- -- 91 %   10/04/20 1800 102/58 98  F (36.7  C) Oral -- -- 94 %   10/04/20 1730 100/61 -- -- -- -- 95 %   10/04/20 1700 117/69 98  F (36.7  C) Oral -- -- 97 %   10/04/20 1650 103/62 -- -- -- -- 97 %   10/04/20 1645 109/68 -- -- -- -- 96 %   10/04/20 1640 99/57 -- -- -- -- 97 %   10/04/20 1637 105/58 -- -- -- -- --   10/04/20 1635 108/60 -- -- -- -- 96 %   10/04/20 1633 (!) 88/53 -- -- -- -- --   10/04/20 1631 94/52 -- -- -- -- 96 %   10/04/20 1629 90/52 -- -- -- -- --   10/04/20 1627 112/60 -- -- -- -- --   10/04/20 1620 129/86 -- -- -- -- 99 %   10/04/20 1415 -- 98.1  F (36.7  C) Oral -- -- --   10/04/20 1315 -- 98  F (36.7  C) Oral -- -- --   10/04/20 1210 127/77 98.6  F (37  C) Oral -- -- --   10/04/20 1100 -- 98.4  F (36.9  C) Oral -- -- --   10/04/20 1000 -- 97.9  F (36.6  C) Oral -- -- --   10/04/20 0900 108/70 98  F (36.7  C) Oral -- 18 --   10/04/20 0735 109/72 98  F (36.7  C) Oral -- 18 --   10/04/20 0326 109/71 98.5  F (36.9  C) Oral -- 18 --     SVE: Anterior lip> reduced  to complete/100/0    FHT baseline 150 but with prior fetal tachycardia, mod juliana, accels present, intermittent early decels  [place]: 3-4 in 10 minutes, ~150 MVU    A/P:  Sailaja Aguirre is a 40 year old  at 39w4d by LMP c/w 13w US, here for IOL for polyhydramnios and AMA.     # IOL:   - S/p ruffin balloon, PV misoprostol, AROM, on pitocin at 14mU/min. Now complete, will start pushing  - GBS positive, adequate on PCN   - Pain: s/p epidural   - Anticipate      # FWB: Category I FHT, reactive  - GBS pos, on PCN  - EFW 8.5, vtx by BSUS, placenta posterior    Arlene Reyes MD  Ob/Gyn Resident, PGY-3  Pager: [phone]  10/05/20 2:37 AM

## 2020-10-05 NOTE — OR NURSING
Pt to PACU via cart.  VSS, temp 99.2, LR infusing by gravity to piv without complications. Denies any nausea.  I)IV to pump, compression to pneumoboots re-started.  A) Stable P) pt to inform RN if she experiences pain or nausea.  Post op cares.   Anticipate transfer to PSCU after 1 hour post op.

## 2020-10-05 NOTE — ANESTHESIA PROCEDURE NOTES
Peripheral Nerve Block Procedure Note      Staff -   Anesthesiologist:  Scotty Cuevas MD  Performed By: anesthesiologist  Location: OR AFTER induction and OB  Procedure Start/Stop TImes:      10/5/2020 12:00 AM     10/5/2020 12:05 AM    patient identified, IV checked, site marked, risks and benefits discussed, informed consent, monitors and equipment checked, pre-op evaluation, at physician/surgeon's request and post-op pain management      Correct Patient: Yes      Correct Position: Yes      Correct Site: Yes      Correct Procedure: Yes      Correct Laterality:  Yes    Site Marked:  Yes  Procedure details:     Procedure:  TAP    ASA:  2 and Emergent    Diagnosis:  C/s arrest of descent    Laterality:  Bilateral    Position:  Supine    Sterile Prep: chloraprep, mask and sterile gloves      Local skin infiltration:  None    Needle:  Short bevel    Needle gauge:  21    Needle length (mm):  110    Ultrasound: Yes      Ultrasound used to identify targeted nerve, plexus, or vascular structure and placed a needle adjacent to it      Permanent Image entered into patiient's record      Abnormal pain on injection: No      Blood Aspirated: No      Paresthesias:  No    Bleeding at site: No      Bolus via:  Needle    Infusion Method:  Single Shot    Complications:  None  Assessment/Narrative:     Injection made incrementally with aspirations every (mL):  5

## 2020-10-05 NOTE — ANESTHESIA PREPROCEDURE EVALUATION
Anesthesia Pre-Procedure Evaluation    Patient: Sailaja Aguirre   MRN:     [id number] Gender:   female   Age:    40 year old :      1980        Preoperative Diagnosis: 39 weeks gestation of pregnancy [Z3A.39]   Procedure(s):   SECTION     LABS:  CBC:   Lab Results   Component Value Date    WBC 5.0 2020    WBC 5.6 2020    HGB 12.4 10/03/2020    HGB 12.1 2020    HCT 35.1 2020    HCT 35.3 2020     10/03/2020     2020     BMP:   Lab Results   Component Value Date     2019     2019    POTASSIUM 3.9 2019    POTASSIUM 3.8 2019    CHLORIDE 106 2019    CHLORIDE 105 2019    CO2 27 2019    CO2 26 2019    BUN 11 2019    BUN 8 2019    CR 0.55 2019    CR 0.55 2019    GLC 86 2019    GLC 79 2019     COAGS: No results found for: PTT, INR, FIBR  POC:   Lab Results   Component Value Date    HCG Negative 2019     OTHER:   Lab Results   Component Value Date    LACT 0.6 (L) 2019    DOMINGO 8.3 (L) 2019    ALBUMIN 3.6 2019    PROTTOTAL 6.7 (L) 2019    ALT 23 2019    AST 14 2019    ALKPHOS 46 2019    BILITOTAL 0.6 2019    LIPASE 104 2019    CRP <2.9 2019    SED 10 2019        Preop Vitals    BP Readings from Last 3 Encounters:   10/04/20 126/66   20 99/65   20 105/71    Pulse Readings from Last 3 Encounters:   20 87   20 80   20 102      Resp Readings from Last 3 Encounters:   10/04/20 18   20 18   19 18    SpO2 Readings from Last 3 Encounters:   10/04/20 94%   20 97%   19 98%      Temp Readings from Last 1 Encounters:   10/04/20 37.2  C (99  F) (Oral)    Ht Readings from Last 1 Encounters:   10/03/20 1.524 m (5')      Wt Readings from Last 1 Encounters:   10/03/20 69.9 kg (154 lb)    Estimated body mass index is 30.08 kg/m  as calculated from the following:     Height as of this encounter: 1.524 m (5').    Weight as of this encounter: 69.9 kg (154 lb).     LDA:  Peripheral IV 10/03/20 Right Lower forearm (Active)   Site Assessment WDL 10/04/20 1445   Line Status Infusing 10/04/20 1445   Phlebitis Scale 1-->erythema at access site with or without pain 10/03/20 2140   Infiltration Scale 0 10/03/20 2140   Number of days: 1       Intrathecal/Epidural Catheter 10/04/20 (Active)   Site Assessment Clean, dry, intact 10/04/20 1745   Line Status Infusing 10/04/20 1745   Number of days: 0        Past Medical History:   Diagnosis Date     Abnormal Pap smear of cervix 04/02/2020    see problem list      Past Surgical History:   Procedure Laterality Date     DILATION AND CURETTAGE SUCTION N/A 9/15/2017    Procedure: DILATION AND CURETTAGE SUCTION;  Suction Dilation and Curettage ;  Surgeon: Gris Huerta MD;  Location: [facility] OR     HERNIA REPAIR      AGE 8 YEARS OLD      No Known Allergies          JZG FV AN PHYSICAL EXAM    Assessment:   ASA SCORE: 2 emergent   H&P: History and physical reviewed and following examination; no interval change.    NPO Status: NPO Appropriate     Plan:   Anes. Type:  MAC; Peripheral Nerve Block; For Post-op pain in coordination with surgeon; Epidural     Epidural Details:  Catheter; Lumbar     Block Details: TAP; Exparel; Single Shot   Pre-Medication: None   Induction:  N/a   Airway: Native Airway   Access/Monitoring: PIV   Maintenance: N/a     Postop Plan:   Postop Pain: Regional  Postop Sedation/Airway: Not planned  Disposition: Inpatient/Admit     PONV Management: Adult Risk Factors: Female   Prevention: Ondansetron     CONSENT: Direct conversation; Via    Plan and risks discussed with: Patient                      cSotty Cuevas MD

## 2020-10-05 NOTE — PLAN OF CARE
VSS. Fundus firm, midline at 1/U. Lochia rubra and light. Cummins draining small amounts of urine (275 mL since 0230) but pitocin shut off at 0630. Drinking adequate amounts of water. Ambulated once to bathroom with standby assist and tolerated with abdominal binder in place. Incision covered with dressing and CDI. Tolerating regular diet, denies nausea. Denies pain, given scheduled tylenol and toradol.   Breastfeeding with minimal assist. Bonding well with infant. Continue current plan of care.

## 2020-10-05 NOTE — ANESTHESIA CARE TRANSFER NOTE
Patient: Sailaja Aguirre    Procedure(s):   SECTION    Diagnosis: 39 weeks gestation of pregnancy [Z3A.39]  Diagnosis Additional Information: No value filed.    Anesthesia Type:   Peripheral Nerve Block, For Post-op pain in coordination with surgeon, Epidural     Note:  Airway :Room Air  Patient transferred to:Labor and Delivery  Comments: Arrived in OB PACU, report to RN, vitals stable, patient comfortable.  Handoff Report: Identifed the Patient, Identified the Reponsible Provider, Reviewed the pertinent medical history, Discussed the surgical course, Reviewed Intra-OP anesthesia mangement and issues during anesthesia, Set expectations for post-procedure period and Allowed opportunity for questions and acknowledgement of understanding      Vitals: (Last set prior to Anesthesia Care Transfer)    CRNA VITALS  10/4/2020 2339 - 10/5/2020 0020      10/5/2020             Pulse:  84    SpO2:  99 %                Electronically Signed By: FRANK Patten CRNA  2020  12:20 AM

## 2020-10-05 NOTE — ANESTHESIA POSTPROCEDURE EVALUATION
Anesthesia POST Procedure Evaluation    Patient: Sailaja Aguirre   MRN:     [id number] Gender:   female   Age:    40 year old :      1980        Preoperative Diagnosis: 39 weeks gestation of pregnancy [Z3A.39]   Procedure(s):   SECTION   Postop Comments: No value filed.     Anesthesia Type: Peripheral Nerve Block, For Post-op pain in coordination with surgeon, Epidural       Disposition: Admission   Postop Pain Control: Uneventful            Sign Out: Well controlled pain   PONV: No   Neuro/Psych: Uneventful            Sign Out: Acceptable/Baseline neuro status   Airway/Respiratory: Uneventful            Sign Out: Acceptable/Baseline resp. status   CV/Hemodynamics: Uneventful            Sign Out: Acceptable CV status   Other NRE: NONE   DID A NON-ROUTINE EVENT OCCUR? No         Last Anesthesia Record Vitals:  CRNA VITALS  10/4/2020 2339 - 10/5/2020 0019      10/5/2020             Pulse:  84    SpO2:  99 %          Last PACU Vitals:  Vitals Value Taken Time   /70 10/05/20 0015   Temp     Pulse 97 10/05/20 0018   Resp 12 10/05/20 0018   SpO2 99 % 10/05/20 0018   Temp src     NIBP 99/68 10/05/20 0006   Pulse 84 10/05/20 0010   SpO2 99 % 10/05/20 0010   Resp     Temp     Ht Rate     Temp 2     Vitals shown include unvalidated device data.      Electronically Signed By: Scotty Cuevas MD, 2020, 12:19 AM

## 2020-10-05 NOTE — PROGRESS NOTES
While I was in OR, patient was checked at around 2 hrs of pushing and found to have made minimal change in station by resident.  FHT intermittently tachycardia to 180's, no maternal fever.  IVF bolus given and recommendation for , which patient accepted.      Currently 160, no accels, mod juliana, no decels.    Discussed minimal to no fetal descent and cat 2 FHR tracing.  Agree with recommendation for  made by resident and patient also in agreement. Informed consent obtained, risks and benefits explained to patient.  All questions answered.  To OR.    CARLEY BLEDSOE MD

## 2020-10-05 NOTE — PROGRESS NOTES
Patient arrived to [facility] unit via zoom cart at 0220,with belongings, accompanied by spouse/ significant other, with infant in arms. Received report from Pa, RN and checked bands. Unit and room orientation completd. Call light given and within arms reach; no concerns present at this time. Continue with plan of care.

## 2020-10-05 NOTE — PROGRESS NOTES
[facility]   Postpartum Note    Name:  Sailaja Aguirre  MRN: [id number]    S: Patient doing okay this morning, feeling well overall. Pain minimal. Tolerating water and crackers. Has not yet ambulated. Cummins catheter still in place. Lochia similar to menses.  Breast feeding.     O:   Patient Vitals for the past 24 hrs:   BP Temp Temp src Pulse Resp SpO2   10/05/20 0703 92/63 98.6  F (37  C) Oral 86 16 --   10/05/20 0535 97/68 98.9  F (37.2  C) Oral 90 16 95 %   10/05/20 0430 93/63 -- -- 92 16 95 %   10/05/20 0330 105/69 98.6  F (37  C) Oral 95 16 96 %   10/05/20 0300 121/88 -- -- 99 16 97 %   10/05/20 0224 108/74 100  F (37.8  C) Oral 80 16 98 %   10/05/20 0200 95/64 99.5  F (37.5  C) Oral 87 17 97 %   10/05/20 0145 98/68 -- -- 94 15 96 %   10/05/20 0130 112/72 -- -- 96 18 98 %   10/05/20 0115 108/71 -- -- 86 19 98 %   10/05/20 0100 105/79 -- -- 91 17 99 %   10/05/20 0045 102/69 -- -- 89 23 100 %   10/05/20 0030 108/67 -- -- 89 22 100 %   10/05/20 0015 117/70 99.2  F (37.3  C) Oral 79 22 --   10/04/20 2230 104/58 99.5  F (37.5  C) Oral -- 18 99 %   10/04/20 2200 110/66 -- -- -- 16 98 %   10/04/20 2130 110/63 -- -- -- 16 98 %   10/04/20 2100 126/66 -- -- -- 16 94 %   10/04/20 2040 -- 99  F (37.2  C) Oral -- -- --   10/04/20 2030 113/59 -- -- -- -- 96 %   10/04/20 1953 123/62 99.2  F (37.3  C) Oral -- -- 98 %   10/04/20 1900 115/70 100  F (37.8  C) -- -- -- 93 %   10/04/20 1845 -- 99.3  F (37.4  C) Axillary -- -- 93 %   10/04/20 1830 90/50 -- -- -- -- 91 %   10/04/20 1800 102/58 98  F (36.7  C) Oral -- -- 94 %   10/04/20 1730 100/61 -- -- -- -- 95 %   10/04/20 1700 117/69 98  F (36.7  C) Oral -- -- 97 %   10/04/20 1650 103/62 -- -- -- -- 97 %   10/04/20 1645 109/68 -- -- -- -- 96 %   10/04/20 1640 99/57 -- -- -- -- 97 %   10/04/20 1637 105/58 -- -- -- -- --   10/04/20 1635 108/60 -- -- -- -- 96 %   10/04/20 1633 (!) 88/53 -- -- -- -- --   10/04/20 1631 94/52 -- -- -- -- 96 %   10/04/20 1629  90/52 -- -- -- -- --   10/04/20 1627 112/60 -- -- -- -- --   10/04/20 1620 129/86 -- -- -- -- 99 %   10/04/20 1415 -- 98.1  F (36.7  C) Oral -- -- --   10/04/20 1315 -- 98  F (36.7  C) Oral -- -- --   10/04/20 1210 127/77 98.6  F (37  C) Oral -- -- --   10/04/20 1100 -- 98.4  F (36.9  C) Oral -- -- --   10/04/20 1000 -- 97.9  F (36.6  C) Oral -- -- --   10/04/20 0900 108/70 98  F (36.7  C) Oral -- 18 --     Gen:  Resting comfortably, NAD  CV:  Regular rate, well perfused  Pulm:  Breathing room air comfortably  Abd:  Soft, appropriately ttp, non-distended. Fundus at 1cm above umbilicus, firm and non-tender.  Incision: C/d/i, no surrounding redness or erythema with sterile dressing in place  Ext:  Non-tender, trace LE edema b/l    I/O last 3 completed shifts:  In: 4274.67 [P.O.:800; I.V.:2474.67; IV Piggyback:1000]  Out: 2243 [Urine:875; Blood:1368]    Hgb:   Hemoglobin   Date Value Ref Range Status   10/03/2020 12.4 11.7 - 15.7 g/dL Final   10/03/2020 12.4 11.7 - 15.7 g/dL Final       Assessment/Plan:  40 year old  on POD#1 s/p PLTCS for category II fetal heart tracing remote from delivery, suspected LGA fetus, and lack of progress in the second stage of labor. Doing well postpartum.    Continue with routine postpartum care:  Pain: Well-controlled with Tylenol, Toradol, oxycodone prn. Transition to ibuprofen today  Hgb: 12.4> QBL 1258 (methergine, TXA)> AM Hgb pending. Patient is asymptomatic from acute blood loss anemia and vitals are reassuring. Will discharge home with PO iron if Hgb <10  Rh: positive  Rubella: immune  Feed: breast  BC: Did not discuss  Dispo: DC on POD#2-3 pending postoperative goals    Arlene Reyes MD  Ob/Gyn Resident, PGY-3  Pager: [phone]  10/05/20 8:25 AM     Attestation:   This patient was seen and evaluated by me, separately from the house staff team. I have reviewed the note/plan above and agree.     Doing very well and ruffin is out, already walking around this am. Pain  controlled without oxy right now. Discussed routine cares and restrictions after c/s since prior vaginal births. Questions answered. Plan discharge home POD #2-3    Alisha Sheldon MD

## 2020-10-05 NOTE — PLAN OF CARE
FHR tachy, unchanged after fluid bolus. Provider recommended  and discussed risks and benefits. Pt agreeable with plan. Pt brought back to OR at 2245.

## 2020-10-05 NOTE — PROVIDER NOTIFICATION
10/04/20 2106   Provider Notification   Provider Name/Title Dr. Aden   Method of Notification Electronic Page   Request Evaluate - Remote   Notification Reason Fetal Baseline Change   FHR tachy, provider paged to assessed. Provider response to stop pit and give 500ml bolus.

## 2020-10-05 NOTE — DISCHARGE SUMMARY
[facility] Discharge Summary    Sailaja Aguirre MRN# [id number]   Age: 40 year old YOB: 1980     Date of Admission:  10/3/2020  Date of Discharge::  10/7/2020  Admitting Physician:  Laquita Ruiz MD  Discharge Physician:  Laquita Ruiz MD             Admission Diagnoses:   -  at 39w3d   - Advanced maternal age  - Polyhydramnios  - Positive GBS          Discharge Diagnosis:     - Same, delivered   - Cat II FHT RFD  - Suspected LGA fetus           Procedures:     Procedure(s): - Primary low transverse  section with double layer uterine closure via Pfannenstiel skin incision  - Epidural anesthesia  - TAP block                Medications Prior to Admission:     Medications Prior to Admission   Medication Sig Dispense Refill Last Dose     Prenatal Vit-Fe Fumarate-FA (PRENATAL MULTIVITAMIN W/IRON) 27-0.8 MG tablet Take 1 tablet by mouth daily 90 tablet 3 10/3/2020 at Unknown time     senna (SENOKOT) 8.6 MG tablet Take 2 tablets by mouth 2 times daily as needed for constipation 120 tablet 0 10/3/2020 at 0800     senna-docusate (SENOKOT-S/PERICOLACE) 8.6-50 MG tablet Take 1 tablet by mouth daily Start after delivery. 100 tablet 0 10/3/2020 at Unknown time     acetaminophen (TYLENOL) 325 MG tablet Take 2 tablets (650 mg) by mouth every 6 hours as needed for mild pain Start after Delivery. 100 tablet 0 Unknown at Unknown time     hydroquinone (CLOTILDE) 4 % external cream Apply topically to the entire face BID for up to 12 weeks, then discontinue 56.8 g 0 Unknown at Unknown time     ibuprofen (ADVIL/MOTRIN) 600 MG tablet Take 1 tablet (600 mg) by mouth every 6 hours as needed for moderate pain Start after delivery 60 tablet 0 Unknown at Unknown time     [DISCONTINUED] acetaminophen (TYLENOL) 325 MG tablet Take 2 tablets (650 mg) by mouth every 6 hours as needed for mild pain Start after Delivery. 100 tablet 0 Unknown at Unknown time     [DISCONTINUED] ibuprofen  (ADVIL/MOTRIN) 600 MG tablet Take 1 tablet (600 mg) by mouth every 6 hours as needed for moderate pain Start after delivery 60 tablet 0 Unknown at Unknown time     [DISCONTINUED] Prenatal Vit-Fe Fumarate-FA (PRENATAL VITAMIN AND MINERAL) 28-0.8 MG TABS Take 1 tablet by mouth daily 100 tablet 3 10/3/2020 at Unknown time     [DISCONTINUED] senna-docusate (SENOKOT-S/PERICOLACE) 8.6-50 MG tablet Take 1 tablet by mouth daily Start after delivery. 100 tablet 0 10/3/2020 at Unknown time             Discharge Medications:        Review of your medicines      START taking      Dose / Directions   simethicone 80 MG chewable tablet  Commonly known as: MYLICON      Dose: 80 mg  Take 1 tablet (80 mg) by mouth 4 times daily as needed for other (gas)  Quantity: 30 tablet  Refills: 0        CONTINUE these medicines which may have CHANGED, or have new prescriptions. If we are uncertain of the size of tablets/capsules you have at home, strength may be listed as something that might have changed.      Dose / Directions   acetaminophen 325 MG tablet  Commonly known as: TYLENOL  This may have changed: Another medication with the same name was removed. Continue taking this medication, and follow the directions you see here.  Used for: Encounter for supervision of multigravida of advanced maternal age in third trimester      Dose: 650 mg  Take 2 tablets (650 mg) by mouth every 6 hours as needed for mild pain Start after Delivery.  Quantity: 100 tablet  Refills: 0     ibuprofen 600 MG tablet  Commonly known as: ADVIL/MOTRIN  This may have changed: Another medication with the same name was removed. Continue taking this medication, and follow the directions you see here.  Used for: Encounter for supervision of multigravida of advanced maternal age in third trimester      Dose: 600 mg  Take 1 tablet (600 mg) by mouth every 6 hours as needed for moderate pain Start after delivery  Quantity: 60 tablet  Refills: 0     prenatal multivitamin  w/iron 27-0.8 MG tablet  This may have changed: Another medication with the same name was removed. Continue taking this medication, and follow the directions you see here.  Used for: Encounter for supervision of multigravida of advanced maternal age in third trimester      Dose: 1 tablet  Take 1 tablet by mouth daily  Quantity: 90 tablet  Refills: 3     senna-docusate 8.6-50 MG tablet  Commonly known as: SENOKOT-S/PERICOLACE  This may have changed: Another medication with the same name was removed. Continue taking this medication, and follow the directions you see here.  Used for: Encounter for supervision of multigravida of advanced maternal age in third trimester      Dose: 1 tablet  Take 1 tablet by mouth daily Start after delivery.  Quantity: 100 tablet  Refills: 0        CONTINUE these medicines which have NOT CHANGED      Dose / Directions   hydroquinone 4 % external cream  Commonly known as: CLOTILDE  Used for: Melasma      Apply topically to the entire face BID for up to 12 weeks, then discontinue  Quantity: 56.8 g  Refills: 0     senna 8.6 MG tablet  Commonly known as: SENOKOT      Dose: 2 tablet  Take 2 tablets by mouth 2 times daily as needed for constipation  Quantity: 120 tablet  Refills: 0           Where to get your medicines      These medications were sent to [facility] [place], [place] - [address]  [address] [address]    Phone: [phone]     simethicone 80 MG chewable tablet            Consultations:   - Anesthesia          Brief Admission History/Labor Course:   40 year old  at 39w4d who presented for IOL for AMA and borderline polyhydramnios. She underwent cervical ripening with vaginal misoprostol and a Cummins balloon. She was subsequently started on pitocin and AROM was performed for clear fluid. She received an epidural for pain control. She progressed to complete cervical dilation but was only 0 station at that time. She pushed with good  effort for approximately 90 minutes but no fetal descent was noted and the fetal head was felt to be poorly aligned within the maternal pelvis. Fetal tachycardia also developed despite intrauterine resuscitation. Given all these factors, a  section was recommended. Risks and benefits were reviewed and the patient consented for the procedure.     Intraoperative course   The procedure was uncomplicated.  QBL 1258 mL.  See operative report for details.     Operative findings:   A single vigorous, liveborn female infant weighing 3657g with apgars of 8 and 9.  Normal appearing uterus, fallopian tubes, ovaries.  No nuchal cord.  Clear amniontic fluid. Direct OP position       Postpartum Course   The patient's hospital course was unremarkable.  She recovered as anticipated and experienced no post-operative complications. On discharge, her pain was well controlled. Vaginal bleeding is similar to peak menstrual flow.  Voiding without difficulty.  Ambulating well and tolerating a normal diet.  No fever or significant wound drainage.  Breastfeeding well.  Infant is stable.  No bowel movement yet.  She was discharged on post-partum day #3.    Post-partum hemoglobin:   Hemoglobin   Date Value Ref Range Status   10/05/2020 10.9 (L) 11.7 - 15.7 g/dL Final             Discharge Instructions and Follow-Up:     Discharge diet: Regular   Discharge activity: No lifting greater than 20 lbs, pushing, pulling, or other strenuous activity for 6 weeks. Pelvic rest for 6 weeks including no sexual intercourse, tampons, or douching. No driving until you can slam on the breaks without pain or while on narcotic pain medications.    Discharge follow-up: Follow up with primary OB for routine postpartum visit in 6 weeks   Wound care: Keep incision clean and dry           Discharge Disposition:     Discharged to home     Arlene Reyes MD  Ob/Gyn Resident, PGY-3  Pager: [phone]  10/07/20 7:34 AM          Physician Attestation   I,  Laquita Ruiz, saw and evaluated this patient prior to discharge.  I discussed the patient with the resident/fellow and agree with plan of care as documented in the note.      I personally reviewed vital signs, medications, labs and exam.    I personally spent 20 minutes on discharge activities.    Laquita Ruiz MD  Date of Service (when I saw the patient): 10/07/20

## 2020-10-05 NOTE — OP NOTE
Sailaja Aguirre    1980   MRN [id number]    Operative Note    Date of Surgery: 10/04/20      Surgeon: Gris Huerta MD     Assistants: Arlene Reyes, PGY-3     Pre-operative Diagnoses:  at 39w4d, category II fetal heart tracing remote from delivery, suspected LGA fetus, lack of progress in second stage of labor, AMA, borderline polyhydramnios     Post-operative Diagnoses: Same, now  delivered via below stated procedure     Procedure: Primary low transverse  section with double layer uterine closure via Pfannenstiel skin incision     Anesthesia: Epidural     QBL: 1258cc  IVF: 1000cc  UO: 150cc     Complications: None    Findings: A single vigorous, liveborn female infant weighing 3657g with apgars of 8 and 9.  Normal appearing uterus, fallopian tubes, ovaries.  No nuchal cord.  Clear amniontic fluid. Direct OP position. Small right-sided hysterotomy extension into lower uterine segment.    Specimens: Cord segment and cord blood    Indications: 40 year old  at 39w4d who presented for IOL for AMA and borderline polyhydramnios. She underwent cervical ripening with vaginal misoprostol and a Cummins balloon. She was subsequently started on pitocin and AROM was performed for clear fluid. She received an epidural for pain control. She progressed to complete cervical dilation but was only 0 station at that time. She pushed with good effort for approximately 90 minutes but no fetal descent was noted and the fetal head was felt to be poorly aligned within the maternal pelvis. Fetal tachycardia also developed despite intrauterine resuscitation. Given all these factors, a  section was recommended. Risks and benefits were reviewed and the patient consented for the procedure.    Procedure Details:  The patient was taken back to the operating room where she received a bolus of epidural anesthesia. She was then prepped and draped in the usual sterile fashion in the dorsal supine position  with a leftward tilt. A time out was performed. A pfannenstiel skin incision was made with the scalpel and carried sharply through the underlying layer to the fascia. The fascia was incised in the midline and extended laterally with Doan scissors. The superior aspect of the fascial incision was grasped with Kocher clamps, elevated and the underlying rectus muscles were dissected off bluntly and with Doan scissors. Attention was then turned to the inferior aspect of the incision, which, in a similar fashion was grasped, tented up with Kocher clamps, and the rectus muscle dissected off bluntly and with Doan scissors. The rectus muscles were then  in the midline. The peritoneum was then identified and entered bluntly. This was extended with blunt dissection. The bladder blade was then inserted. The vesicouterine peritoneum was grasped with pick-ups and entered sharply and the incision was extended laterally with Metzenbaum scissors. Then it was grasped and a bladder flap was created digitally. The bladder blade was replaced. The lower uterine segment was incised in a transverse fashion with the scalpel. The incision was extended digitally. The bladder blade was removed. The infant's head was delivered, the body rotated, and the rest of the infant was delivered atraumatically. The cord was clamped and cut and then the baby was handed off the nursing staff. A segment of cord was taken for cord gases. The placenta was then removed with gentle traction on the cord and fundal message. The uterus was exteriorized and cleared of all clots and debris. The uterine incision was repaired with 0-Vicryl in a running locked fashion. A second layer of 0-Monocryl was used to imbricate the incision. Additional bleeding over the area of extension was controlled with multiple figure-of-eight sutures with 0-Monocryl and 0-Vicryl.The posterior cul-de-sac was cleared of clots and debris. The uterus was returned to the abdomen and  additional bleeding was noted over the left aspect of the hysterotomy. This was controlled with one figure-of-eight suture with 0-Vicryl, after which the hysterotomy was noted to be hemostatic. The gutters were then cleared of clots. A layer of Interceed was placed over the uterus. A kocher clamp was used to elevated the fascia superiorly and inferiorly and good hemostasis was noted. The fascia was closed with 0-Vicryl in a running fashion. The subcutaneous tissue was irrigated and areas of bleeding were controlled with cautery. The skin was closed with 4-0 Monocryl in a subcuticular fashion. The patient tolerated the procedure well and was taken to the recovery room in stable condition. All lap, instrument, and sharps counts were correct times two. Dr. Huerta was present and scrubbed for the entire procedure.     Arlene Reyes MD, MPHS  Ob/Gyn Resident, PGY-3  10/05/20 2:38 AM     I was scrubbed and present for entire procedure, agree with above operative note.    CARLEY HUERTA MD

## 2020-10-05 NOTE — PROGRESS NOTES
Patient pushing for almost 1 hr with no fetal descent, remains at 0 station.  Good maternal effort.  Unable to palpate suture well, but feels likely OT.  EFW feels at least 9lb. I am going for another c/d, resident will begin to push with patient and assess for descent,  If not, will need to discuss c/s.     CARLEY BLEDSOE MD

## 2020-10-05 NOTE — PLAN OF CARE
Data: Sailaja Aguirre transferred to [id number] via zoom cart at 0215. Baby transferred via parent's arms.  Action: Receiving unit notified of transfer: Yes. Patient and family notified of room change. Report given to Mary at 0235. Belongings sent to receiving unit. Accompanied by Registered Nurse. Oriented patient to surroundings. Call light within reach. ID bands double-checked with receiving RN.  Response: Patient tolerated transfer and is stable.

## 2020-10-05 NOTE — PLAN OF CARE
Able to ambulate to bathroom without difficulty, up in room ad marino.  Motivated to move around, wants to avoid constipation/ileus.  VS stable.  Pain well controlled with acetaminophen and toradol. Eating well. Drinking fluids.  Cummins removed, not yet voided.  Will continue with plan of care.

## 2020-10-06 PROCEDURE — 250N000011 HC RX IP 250 OP 636: Performed by: STUDENT IN AN ORGANIZED HEALTH CARE EDUCATION/TRAINING PROGRAM

## 2020-10-06 PROCEDURE — 250N000013 HC RX MED GY IP 250 OP 250 PS 637: Performed by: OBSTETRICS & GYNECOLOGY

## 2020-10-06 PROCEDURE — 120N000002 HC R&B MED SURG/OB UMMC

## 2020-10-06 PROCEDURE — 250N000013 HC RX MED GY IP 250 OP 250 PS 637: Performed by: STUDENT IN AN ORGANIZED HEALTH CARE EDUCATION/TRAINING PROGRAM

## 2020-10-06 RX ADMIN — IBUPROFEN 800 MG: 800 TABLET, FILM COATED ORAL at 08:35

## 2020-10-06 RX ADMIN — ACETAMINOPHEN 975 MG: 325 TABLET, FILM COATED ORAL at 01:18

## 2020-10-06 RX ADMIN — SIMETHICONE 80 MG: 80 TABLET, CHEWABLE ORAL at 08:38

## 2020-10-06 RX ADMIN — KETOROLAC TROMETHAMINE 30 MG: 30 INJECTION, SOLUTION INTRAMUSCULAR at 02:33

## 2020-10-06 RX ADMIN — ACETAMINOPHEN 975 MG: 325 TABLET, FILM COATED ORAL at 08:32

## 2020-10-06 RX ADMIN — DOCUSATE SODIUM 50 MG AND SENNOSIDES 8.6 MG 2 TABLET: 8.6; 5 TABLET, FILM COATED ORAL at 20:45

## 2020-10-06 RX ADMIN — SIMETHICONE 80 MG: 80 TABLET, CHEWABLE ORAL at 14:59

## 2020-10-06 RX ADMIN — DOCUSATE SODIUM 50 MG AND SENNOSIDES 8.6 MG 2 TABLET: 8.6; 5 TABLET, FILM COATED ORAL at 08:32

## 2020-10-06 RX ADMIN — SIMETHICONE 80 MG: 80 TABLET, CHEWABLE ORAL at 20:45

## 2020-10-06 RX ADMIN — ACETAMINOPHEN 975 MG: 325 TABLET, FILM COATED ORAL at 20:44

## 2020-10-06 RX ADMIN — IBUPROFEN 800 MG: 800 TABLET, FILM COATED ORAL at 15:02

## 2020-10-06 RX ADMIN — ACETAMINOPHEN 975 MG: 325 TABLET, FILM COATED ORAL at 14:59

## 2020-10-06 RX ADMIN — IBUPROFEN 800 MG: 800 TABLET, FILM COATED ORAL at 20:44

## 2020-10-06 NOTE — PLAN OF CARE
Low BPs pt denies dizziness, lightheadedness, blurry vision with good skin color, does not appear pale or weak. PP assessments wnl. Adequate output. Breastfeeding on cue, bonding with infant. Will continue to monitor per plan.

## 2020-10-06 NOTE — PLAN OF CARE
Pt reports numbness and tingling in hands that has been present throughout pregnancy, getting worse in left arm/hand. Denies vision changes, headache, dizziness. Tylenol and toradol adequate for pain. Voiding, ambulating and breastfeeding independently. Continue with plan of care.

## 2020-10-06 NOTE — PROVIDER NOTIFICATION
10/05/20 2125   Provider Notification   Provider Name/Title Dr. Hermosillo   Method of Notification Electronic Page   Request Evaluate in Person   Notification Reason Vital Signs Change     low BP readings 81/54 p 84 and 81/52 p 87 10 mints apart.  Pt denies dizziness/lightheadedness, Nausea, changes in vision. Pt does not appear pale.

## 2020-10-06 NOTE — PROGRESS NOTES
[facility]   Postpartum Note    Name:  Sailaja Aguirre  MRN: [id number]    S: Patient doing okay this morning, very tired and falling asleep during our conversation. She is most concerned about numbness in her hands, which was present for a significant portion of pregnancy but has worsened postpartum. Pain otherwise minimal. Tolerating regular diet. Ambulating without dizziness. Voiding spontaneously. Lochia similar to menses.  Breastfeeding. Patient's nurse confirmed these milestones.    O:   Patient Vitals for the past 24 hrs:   BP Temp Temp src Pulse Resp   10/06/20 0100 90/54 98.3  F (36.8  C) Oral 69 16   10/05/20 2127 (!) 86/58 97.6  F (36.4  C) Oral 79 16   10/05/20 2115 (!) 81/52 97.4  F (36.3  C) Oral 87 16   10/05/20 2107 (!) 81/54 -- -- 84 --   10/05/20 1630 90/53 98.1  F (36.7  C) Oral 81 16   10/05/20 0900 -- 98.8  F (37.1  C) -- -- --     Gen:  Resting comfortably, NAD  CV:  Regular rate, well perfused  Pulm:  Breathing room air comfortably  Abd:  Soft, appropriately ttp, non-distended. Fundus at 1cm above umbilicus, firm and non-tender.  Incision: C/d/i, no surrounding redness or erythema with steri strips in place  Ext:  Non-tender, trace LE edema b/l    I/O last 3 completed shifts:  In: 600 [P.O.:600]  Out: 1151 [Urine:1151]    Hgb:   Hemoglobin   Date Value Ref Range Status   10/05/2020 10.9 (L) 11.7 - 15.7 g/dL Final       Assessment/Plan:  40 year old  on POD#2 s/p PLTCS for category II fetal heart tracing remote from delivery, suspected LGA fetus, and lack of progress in the second stage of labor. Doing well postpartum.    Hand numbness:  - Likely related to carpal tunnel in context of pregnancy, was unable to discuss further due to patient drowsiness. Will reassess when patient more awake    Continue with routine postpartum care:  Pain: Well-controlled with Tylenol, ibuprofen, oxycodone prn  Hgb: 12.4> QBL 1258 (methergine, TXA)> 10.9. Patient is asymptomatic and  vitals are reassuring. No evidence for acute blood loss anemia at this time  Rh: positive  Rubella: immune  Feed: breast  BC: Did not yet discuss  Dispo: DC likely home tomorrow    Arlene Reyes MD  Ob/Gyn Resident, PGY-3  Pager: [phone]  10/06/20 8:07 AM     Physician Attestation   I, Nano Viramontes MD, personally examined and evaluated this patient.  I discussed the patient with the resident/fellow and care team, and agree with the assessment and plan of care as documented in the note of 10/06/20.      I personally reviewed vital signs, medications, labs and exam.    Key findings: Doing well.   Working on breastfeeding.   Feels pretty distended still but is starting to pass flatus.   Incision CDI.   LE edema +3 bilaterally.   Anticipate discontinue POD#2-3  Nano Viramontes MD  Date of Service (when I saw the patient): 10/06/20

## 2020-10-06 NOTE — PLAN OF CARE
Patient's pain is comfortably manageable with her current meds. She claimed that the right side of her incision gives her an intermittent  burning pain. Checked incision but  did not noticed any discharge/bleeding and steri strips are in placed. Still with mild numbness/tingling of both hands and provider is aware of her concern. Breastfeeding with assist for deeper latch. Doing hand expression and pumping with result. Will continue with plan of care.

## 2020-10-07 VITALS
SYSTOLIC BLOOD PRESSURE: 105 MMHG | TEMPERATURE: 97.6 F | HEART RATE: 73 BPM | BODY MASS INDEX: 30.23 KG/M2 | WEIGHT: 154 LBS | HEIGHT: 60 IN | DIASTOLIC BLOOD PRESSURE: 69 MMHG | RESPIRATION RATE: 16 BRPM | OXYGEN SATURATION: 95 %

## 2020-10-07 PROCEDURE — 250N000013 HC RX MED GY IP 250 OP 250 PS 637: Performed by: STUDENT IN AN ORGANIZED HEALTH CARE EDUCATION/TRAINING PROGRAM

## 2020-10-07 PROCEDURE — 250N000013 HC RX MED GY IP 250 OP 250 PS 637: Performed by: OBSTETRICS & GYNECOLOGY

## 2020-10-07 RX ORDER — SIMETHICONE 80 MG
80 TABLET,CHEWABLE ORAL 4 TIMES DAILY PRN
Qty: 30 TABLET | Refills: 0 | Status: SHIPPED | OUTPATIENT
Start: 2020-10-07

## 2020-10-07 RX ADMIN — IBUPROFEN 800 MG: 800 TABLET, FILM COATED ORAL at 03:55

## 2020-10-07 RX ADMIN — IBUPROFEN 800 MG: 800 TABLET, FILM COATED ORAL at 10:48

## 2020-10-07 RX ADMIN — ACETAMINOPHEN 975 MG: 325 TABLET, FILM COATED ORAL at 03:55

## 2020-10-07 RX ADMIN — ACETAMINOPHEN 975 MG: 325 TABLET, FILM COATED ORAL at 10:48

## 2020-10-07 RX ADMIN — DOCUSATE SODIUM 50 MG AND SENNOSIDES 8.6 MG 2 TABLET: 8.6; 5 TABLET, FILM COATED ORAL at 07:46

## 2020-10-07 RX ADMIN — SIMETHICONE 80 MG: 80 TABLET, CHEWABLE ORAL at 10:48

## 2020-10-07 NOTE — PROGRESS NOTES
[facility]   Postpartum Note    Name:  Sailaja Aguirre  MRN: [id number]    S: Patient doing well. Still with numbness and swelling in her hands, swelling in her feet is a bit worse. Otherwise doing well, hoping for discharge today if baby meets bilirubin goals. Pain controlled. Tolerating regular diet. Ambulating without dizziness. Passing flatus. Voiding spontaneously. Lochia similar to menses.  Breastfeeding.    O:   Patient Vitals for the past 24 hrs:   BP Temp Temp src Pulse Resp   10/07/20 0045 111/77 98.2  F (36.8  C) Oral 76 18   10/06/20 2040 102/65 98.6  F (37  C) Oral 90 18   10/06/20 1459 104/67 98.1  F (36.7  C) Oral 79 16   10/06/20 0839 96/69 98.2  F (36.8  C) Oral 88 16     Gen:  Resting comfortably, NAD  CV:  Regular rate, well perfused  Pulm:  Breathing room air comfortably  Abd:  Soft, appropriately ttp, non-distended. Fundus at 3cm above umbilicus, firm and non-tender.  Incision: C/d/i, no surrounding redness or erythema with steri strips in place  Ext:  Non-tender, 2+ LE edema b/l    Assessment/Plan:  40 year old  on POD#3 s/p PLTCS for category II fetal heart tracing remote from delivery, suspected LGA fetus, and lack of progress in the second stage of labor. Doing well postpartum.    Hand numbness:  - Likely related to carpal tunnel in context of pregnancy, discussed application of wrist braces (patient has these at home already)    Continue with routine postpartum care:  Pain: Well-controlled with Tylenol, ibuprofen, oxycodone prn  Hgb: 12.4> QBL 1258 (methergine, TXA)> 10.9. Patient is asymptomatic and vitals are reassuring. No evidence for acute blood loss anemia at this time  Rh: positive  Rubella: immune  Feed: breast  BC: POPs  Dispo: DC home today    Arlene Reyes MD  Ob/Gyn Resident, PGY-3  Pager: [phone]  10/07/20 7:33 AM         Physician Attestation   I, Laquita Ruiz MD, personally examined and evaluated this patient.  I discussed  the patient with the resident/fellow and care team, and agree with the assessment and plan of care as documented in the note of 10/03/20.      I personally reviewed vital signs, medications, labs and exam.    Key findings: 40 year old  on POD 3 s/p PLTCS for category 2 tracing, remote from delivery. Doing well, plan for discharge to home today. Reviewed discharge instructions including activity restrictions, pelvic rest and follow up plan. Reviewed signs of excessive bleeding, infection, postpartum pre-eclampsia, mastitis, DVT and postpartum depression - instructed patient to call if concerned about any of these or other concerns. Patient to follow up in 6 weeks for routine postpartum visit, planning POPs for contraception. All questions answered.    Laquita Ruiz MD  Date of Service (when I saw the patient): 10/07/20

## 2020-10-07 NOTE — CONSULTS
"[facility]  MATERNAL CHILD HEALTH   INITIAL PSYCHOSOCIAL ASSESSMENT     DATA:     Presenting Information: Mom is a  who delivered an infant female, \"Annamaria\" on 10/4/2020 at 39w4d gestation in the setting of  for failure to progress in 2nd stage. SW was consulted for EPDS score of 10.    Living Situation: Parents are Sailaja and Paul, , who live together in [place].    Social Support: Sailaja reports to have wonderful support from family, friends, her children, and her spouse.  She also has a supportive .    Education/Employment: Sailaja was working during this pregnancy and is presently on maternity leave. She has no concerns about navigating time off to be with her , though does anticipate her recovery to be lengthy due to an unplanned .    Insurance: [employer] with [facility].    Source of Financial Support: Employment.    Mental Health History: None indicated.    History of Postpartum Mood Disorders: None    Chemical Health History: None indicated.    Legal/Child Protection Involvement: None indicated.    INTERVENTION:       Chart review    Collaboration with team: AIDEN Almendarez    Conducted Psychosocial Assessment    Introduction to Maternal Child Health SW role and scope of practice    Validated emotions and provided supportive listening    Provided psychoeducation on  mood and anxiety disorders    ASSESSMENT:     Coping: Sailaja is coping well with many unexpected turns during her pregnancy. She admits that it has been stressful, as she had a previa, then baby was in a poor position, then polyhydraminos, then not progressing in labor so she ended up with a . She is presently disappointed that Annamaria is falling asleep during feeds, but is open to using formula and doing whatever it takes to have a healthy baby.  She has a positive outlook and believes that the many changes during this pregnancy " were part of God's plan and she is adapting. She feels good about her level of support at home and looks forward to introducing her baby to her two daughters, ages 14 and 7.    Assessment of parental risk for PMAD: Moderate, considering high EPDS, though mitigated by good social support and positive coping skills.    Risk Factors: Pregnancy and birth during global pandemic    Resiliency Factors & Strengths: Strong family support, experienced parent, positive coping, financial security, stable housing, demonstrated commitment to adapting to difficult circumstances.    PLAN:     SW will continue to follow for supportive intervention.    MOHINDER Muro, [facility]  Clinical   Maternal Child Health  [facility]  Direct: [phone]  Pager: [phone]  After Hours SW: [phone]

## 2020-10-07 NOTE — PLAN OF CARE
VSS, postpartum assessment WNL, incision approximated with steri-strips intact. Pt taking scheduled motrin and tylenol for incisional pain.  Continues to have occasional carpal tunnel related tingling in hands/wrists and pedal edema-encouraged elevating extremities on pillow.  Pt still undecided on receiving flu vaccine, TDAP is up to date.  Pt denies questions or concerns at this time.

## 2020-10-19 ENCOUNTER — PATIENT OUTREACH (OUTPATIENT)
Dept: CARE COORDINATION | Facility: CLINIC | Age: 40
End: 2020-10-19

## 2020-10-19 NOTE — PROGRESS NOTES
Clinic Care Coordination Contact  [facility]/Voicemail    Clinical Data: Care Coordinator Outreach  Outreach attempted x 1.  Left message on patient's voicemail with call back information and requested return call.  Plan: Care Coordinator will try to reach patient again in 10 business days.

## 2020-10-22 ENCOUNTER — PATIENT OUTREACH (OUTPATIENT)
Dept: CARE COORDINATION | Facility: CLINIC | Age: 40
End: 2020-10-22

## 2020-10-22 NOTE — PROGRESS NOTES
Clinic Care Coordination Contact    Situation: Patient chart reviewed by care coordinator.    Background:  CC's initial assessment and enrollment to Care Coordination was 7/9/2020.  Patient centered goals were developed with participation from patient. CC handed patient off to CHW for continued outreach every 30 days.     Assessment: Pt delivered her baby two weeks ago via c section. Pt has been in contact with the doctors and has not reported any concerns. Pt continues to work on achieving her goals.     Plan/Recommendations: CHW will continue to follow up with Pt every 30 days. BIBI will continue to follow up with pt every 45 days.     MOHINDER Gilmore  Clinic Care Coordinator   [facility]  [phone]

## 2020-11-05 ENCOUNTER — PATIENT OUTREACH (OUTPATIENT)
Dept: CARE COORDINATION | Facility: CLINIC | Age: 40
End: 2020-11-05

## 2020-11-05 NOTE — PROGRESS NOTES
Clinic Care Coordination Contact  [facility]/Voicemail       Clinical Data: Care Coordinator Outreach  Outreach attempted x 2.  Left message on patient's voicemail with call back information and requested return call.  Plan: Care Coordinator will try to reach patient again in one week. If pt does not respond at that time, CHW will route pt's chart to CC SW for next steps.

## 2020-11-16 ENCOUNTER — PATIENT OUTREACH (OUTPATIENT)
Dept: CARE COORDINATION | Facility: CLINIC | Age: 40
End: 2020-11-16

## 2020-11-16 NOTE — PROGRESS NOTES
"Clinic Care Coordination Contact  Community Health Worker Follow Up    Intervention and Education during outreach:     Called pt to check in. I congratulated her on the birth of her daughter and asked how everything has been going for pt. They named her Flores. She thanked me for the call and said they are \"really good and really tired.\" When I called, pt was feeding the baby and said she couldn't talk long. I asked if she had any concerns at this time, and she said no. Pt said she does need to schedule her postpartum visit but hasn't had the chance to do it yet. I offered to follow up with the clinic and ask someone to reach out to help, and she agreed and said it would be helpful. I encouraged pt to reach out to me if she had any questions or anything we could assist with, and she said she would. Pt asked if I could call back next month to check in, and I agreed.     CHW Plan: Pt will get her postpartum visit scheduled. Pt will call CHW if she needs anything before next outreach. CHW will ask someone from the clinic to follow up and help pt schedule postpartum visit. CHW will follow up in one month. At next outreach, if pt has no new concerns/needs CHW will route chart to CC  for maintenance review.        "

## 2020-12-09 ENCOUNTER — MEDICAL CORRESPONDENCE (OUTPATIENT)
Dept: HEALTH INFORMATION MANAGEMENT | Facility: CLINIC | Age: 40
End: 2020-12-09

## 2020-12-16 ENCOUNTER — PRENATAL OFFICE VISIT (OUTPATIENT)
Dept: OBGYN | Facility: CLINIC | Age: 40
End: 2020-12-16
Payer: MEDICAID

## 2020-12-16 VITALS
HEART RATE: 84 BPM | SYSTOLIC BLOOD PRESSURE: 108 MMHG | WEIGHT: 131 LBS | BODY MASS INDEX: 25.58 KG/M2 | TEMPERATURE: 97.2 F | DIASTOLIC BLOOD PRESSURE: 78 MMHG

## 2020-12-16 DIAGNOSIS — Z30.09 GENERAL COUNSELING FOR PRESCRIPTION OF ORAL CONTRACEPTIVES: ICD-10-CM

## 2020-12-16 PROBLEM — O44.00 PLACENTA PREVIA: Status: RESOLVED | Noted: 2020-05-27 | Resolved: 2020-12-16

## 2020-12-16 PROBLEM — O99.820 GBS (GROUP B STREPTOCOCCUS CARRIER), +RV CULTURE, CURRENTLY PREGNANT: Status: RESOLVED | Noted: 2020-09-14 | Resolved: 2020-12-16

## 2020-12-16 PROBLEM — O09.529 AMA (ADVANCED MATERNAL AGE) MULTIGRAVIDA 35+: Status: RESOLVED | Noted: 2020-05-31 | Resolved: 2020-12-16

## 2020-12-16 PROCEDURE — 99207 PR POST PARTUM EXAM: CPT | Performed by: OBSTETRICS & GYNECOLOGY

## 2020-12-16 RX ORDER — PRENATAL VIT/IRON FUM/FOLIC AC 27MG-0.8MG
1 TABLET ORAL DAILY
Qty: 90 TABLET | Refills: 3 | Status: SHIPPED | OUTPATIENT
Start: 2020-12-16

## 2020-12-16 RX ORDER — ACETAMINOPHEN AND CODEINE PHOSPHATE 120; 12 MG/5ML; MG/5ML
0.35 SOLUTION ORAL DAILY
Qty: 112 TABLET | Refills: 3 | Status: SHIPPED | OUTPATIENT
Start: 2020-12-16

## 2020-12-16 RX ORDER — AMOXICILLIN 250 MG
1 CAPSULE ORAL DAILY
Qty: 100 TABLET | Refills: 0 | Status: SHIPPED | OUTPATIENT
Start: 2020-12-16

## 2020-12-17 NOTE — PROGRESS NOTES
OB GYN CLINIC VISIT  2020    CC: postpartum visit    SUBJECTIVE:  Elizabeth Aguirre is a 40 year old female  here for a postpartum visit.  She had a  Section  on 10/4/20 delivering a healthy baby girl weighing 8 lbs 1 oz at 39w4d.      OB History    Para Term  AB Living   4 3 3 0 1 3   SAB TAB Ectopic Multiple Live Births   0 0 0 0 3      # Outcome Date GA Lbr Ronnie/2nd Weight Sex Delivery Anes PTL Lv   4 Term 10/04/20 39w4d 06:24 / 03:32 3.657 kg (8 lb 1 oz) F CS-LTranv   SHANTHI      Complications: Failure to Progress in Second Stage, Dysfunctional Labor      Name: MAINEFEMALE-ELIZABETH      Apgar1: 8  Apgar5: 9   3 AB 09/15/17 7w3d    AB, MISSED      2 Term 13 40w0d  3.402 kg (7 lb 8 oz) F  None N SHANTHI   1 Term 06 40w0d  2.892 kg (6 lb 6 oz) F  EPI N SHANTHI       delivery complications:  No  breast feeding:  Yes  bladder problems:  No  bowel problems/hemorrhoids:  No  episiotomy/laceration/incision healed? Yes  vaginal flow:  None  [place]:  No  contraception:  oral contraceptive  emotional adjustment:  doing well, happy and tired  back to work:  Not yet    Current Outpatient Medications   Medication     norethindrone (MICRONOR) 0.35 MG tablet     Prenatal Vit-Fe Fumarate-FA (PRENATAL MULTIVITAMIN W/IRON) 27-0.8 MG tablet     senna-docusate (SENOKOT-S/PERICOLACE) 8.6-50 MG tablet     acetaminophen (TYLENOL) 325 MG tablet     hydroquinone (CLOTILDE) 4 % external cream     ibuprofen (ADVIL/MOTRIN) 600 MG tablet     senna (SENOKOT) 8.6 MG tablet     simethicone (MYLICON) 80 MG chewable tablet     No current facility-administered medications for this visit.        OBJECTIVE:  Blood pressure 108/78, pulse 84, temperature 97.2  F (36.2  C), temperature source Temporal, weight 59.4 kg (131 lb), last menstrual period 2020, unknown if currently breastfeeding.   General - pleasant female in no acute distress.  Breast - no nodularity, asymmetry or nipple discharge  bilaterally.  Abdomen - soft, nontender, nondistended, no hepatosplenomegaly.  Pelvic - EG: normal adult female, BUS: within normal limits, Vagina: well rugated, no discharge, Cervix: normal, no lesions or CMT, Uterus: firm, normal sized and nontender, Adnexae: no masses or tenderness.  Rectovaginal - deferred.    ASSESSMENT:  40 year old  with normal postpartum exam    PLAN:  1. Encounter for postpartum visit      2. General counseling for prescription of oral contraceptives    - norethindrone (MICRONOR) 0.35 MG tablet; Take 1 tablet (0.35 mg) by mouth daily  Dispense: 112 tablet; Refill: 3    3. Lactating mother    - Prenatal Vit-Fe Fumarate-FA (PRENATAL MULTIVITAMIN W/IRON) 27-0.8 MG tablet; Take 1 tablet by mouth daily  Dispense: 90 tablet; Refill: 3  - senna-docusate (SENOKOT-S/PERICOLACE) 8.6-50 MG tablet; Take 1 tablet by mouth daily Start after delivery.  Dispense: 100 tablet; Refill: 0    Discussed kegel exercises   May resume normal activities without restrictions  Pap smear was not  done today. Due in     The patient will use POPs for birth control. Full counseling was provided, and all questions answered. Compliance is strongly emphasized.  Return to clinic in one year for an annual, when due for a pap smear or PRN.    Laquita Ruiz MD

## 2020-12-27 ENCOUNTER — HEALTH MAINTENANCE LETTER (OUTPATIENT)
Age: 40
End: 2020-12-27

## 2020-12-28 ENCOUNTER — PATIENT OUTREACH (OUTPATIENT)
Dept: CARE COORDINATION | Facility: CLINIC | Age: 40
End: 2020-12-28

## 2020-12-28 NOTE — PROGRESS NOTES
"Clinic Care Coordination Contact  Patient has completed all goals with Clinic Care Coordination.  Please review the chart and confirm if maintenance is approved.    Called pt to check in. Had her 6 week postpartum visit on 12/16 and said \"the appointment went well.\" Pt is still on leave from work but will be returning in late January or early February 2021--still \"working it out\" with her manager. No new needs or concerns right now. Would appreciate call back in two months to check in and see how things are going. Confirmed that pt has my contact information and encouraged her to reach out in the meantime. Pt thanked me for the call. Wished her and her family happy holidays.      "

## 2020-12-29 ENCOUNTER — PATIENT OUTREACH (OUTPATIENT)
Dept: CARE COORDINATION | Facility: CLINIC | Age: 40
End: 2020-12-29

## 2020-12-29 NOTE — PROGRESS NOTES
Clinic Care Coordination Contact    Situation: Patient chart reviewed by care coordinator.    Background:   CC's initial assessment and enrollment to Care Coordination was 7/9/2020.  Patient centered goals were developed with participation from patient. CC handed patient off to CHW for continued outreach every 30 days.     Assessment: Pt has been working on achieving her goals. Pt recently completed all goals.     Plan/Recommendations: Pt will be moved from enrolled to maintenance. Care Coordination team will reach out to patient in 2 months to see if she is in need of anything else.       Carmella Hector MSFREIDA  Clinic Care Coordinator   [facility]  [phone]

## 2021-03-04 ENCOUNTER — PATIENT OUTREACH (OUTPATIENT)
Dept: CARE COORDINATION | Facility: CLINIC | Age: 41
End: 2021-03-04

## 2021-03-04 NOTE — PROGRESS NOTES
Clinic Care Coordination Contact  [facility]/Voicemail       Clinical Data: Care Coordinator Outreach  Outreach attempted x 1.  Left message on patient's voicemail with call back information and requested return call.  Plan: Care Coordinator will try to reach patient again in 10 business days.

## 2021-03-08 ENCOUNTER — PATIENT OUTREACH (OUTPATIENT)
Dept: CARE COORDINATION | Facility: CLINIC | Age: 41
End: 2021-03-08

## 2021-03-08 ASSESSMENT — ACTIVITIES OF DAILY LIVING (ADL): DEPENDENT_IADLS:: INDEPENDENT

## 2021-03-08 NOTE — PROGRESS NOTES
Clinic Care Coordination Contact    Situation: Patient chart reviewed by care coordinator.    Background: Patient is enrolled in Care Coordination. CHW and SW[facility] are following.     Assessment: CHW called patient on 3/4 for follow up. No answer. CHW left a VM for patient.     Plan/Recommendations: CHW plans to reach out to patient in 10 business days from 3/4 call. SW[facility] will review chart in 6 weeks, per standard workflow, unless involvement is requested sooner    SRI Zapata  Primary Care Clinic- Social Work Care Coordinator  [facility] and [facility]  Ph: [phone]  3/8/2021 10:49 AM

## 2021-03-29 ENCOUNTER — PATIENT OUTREACH (OUTPATIENT)
Dept: CARE COORDINATION | Facility: CLINIC | Age: 41
End: 2021-03-29

## 2021-03-29 NOTE — PROGRESS NOTES
Clinic Care Coordination Contact  [facility]/Voicemail       Clinical Data: Care Coordinator Outreach  Outreach attempted x 2.  Left message on patient's voicemail with call back information and requested return call.  Plan: Care Coordinator will send unable to contact letter with care coordinator contact information via [employer]. Care Coordinator will do no further outreaches at this time.

## 2021-04-13 ENCOUNTER — PATIENT OUTREACH (OUTPATIENT)
Dept: CARE COORDINATION | Facility: CLINIC | Age: 41
End: 2021-04-13

## 2021-04-13 NOTE — PROGRESS NOTES
Clinic Care Coordination Contact    Situation: Patient chart reviewed by care coordinator.    Background: Patient was enrolled in Care Coordination. CHW and SW[facility] were involved    Assessment: CHW has been unable to reach patient via phone and mychart.    Plan/Recommendations: [facility] disenrolled patient from Care Coordination d/t being unable to reach her. No further outreaches will take place. Please re-consult care coordination if needs arise    SRI Zapata  Primary Care Clinic- Social Work Care Coordinator  [facility] and [facility]  Ph: [phone]  4/13/2021 4:02 PM

## 2021-06-16 NOTE — PLAN OF CARE
Referral made to [facility] for early discharge.    Attending Psychiatrist without NP/Trainee

## 2021-10-09 ENCOUNTER — HEALTH MAINTENANCE LETTER (OUTPATIENT)
Age: 41
End: 2021-10-09

## 2022-01-29 ENCOUNTER — HEALTH MAINTENANCE LETTER (OUTPATIENT)
Age: 42
End: 2022-01-29

## 2022-09-17 ENCOUNTER — HEALTH MAINTENANCE LETTER (OUTPATIENT)
Age: 42
End: 2022-09-17

## 2023-01-05 NOTE — BRIEF OP NOTE
Gynecology Brief Op Note    Sailaja Aguirre  [id number]    Date of Surgery: 10/04/20     Surgeon: Gris Huerta MD    Assistants: Arlene Reyes, PGY-3    Pre-operative Diagnoses:  at 39w4d, category II fetal heart tracing remote from delivery, suspected LGA fetus, lack of progress in second stage of labor    Post-operative Diagnoses: Same, now  delivered via below stated procedure    Procedure: Primary low transverse  section with double layer uterine closure via Pfannenstiel skin incision    Anesthesia: Epidural    QBL: 1258cc  IVF: 1000cc  UO: 150cc    Complications: None  Findings: A single vigorous, liveborn female infant weighing 3657g with apgars of 8 and 9.  Normal appearing uterus, fallopian tubes, ovaries.  No nuchal cord.  Clear amniontic fluid. Direct OP position  Specimens: Cord segment and cord blood    Arlene Reyes MD  Ob/Gyn Resident, PGY-3  Pager: [phone]  10/05/20 12:11 AM             (1) Oriented to own ability

## 2023-05-06 ENCOUNTER — HEALTH MAINTENANCE LETTER (OUTPATIENT)
Age: 43
End: 2023-05-06

## 2023-11-06 ENCOUNTER — OFFICE VISIT (OUTPATIENT)
Dept: DERMATOLOGY | Facility: CLINIC | Age: 43
End: 2023-11-06
Payer: COMMERCIAL

## 2023-11-06 DIAGNOSIS — L57.8 ACTINIC SKIN DAMAGE: ICD-10-CM

## 2023-11-06 DIAGNOSIS — L81.1 MELASMA: Primary | ICD-10-CM

## 2023-11-06 PROCEDURE — 99204 OFFICE O/P NEW MOD 45 MIN: CPT | Performed by: STUDENT IN AN ORGANIZED HEALTH CARE EDUCATION/TRAINING PROGRAM

## 2023-11-06 NOTE — PROGRESS NOTES
[facility] Dermatology Note    Encounter Date: 2023    Dermatology Problem List:  - ***    Major PMHx  -   ______________________________________    Impression/Plan:  There are no diagnoses linked to this encounter.    {Procedure:463364}    Follow-up in ***.       Staff Involved:  Staff Only    Ricardo Adrian MD   of Dermatology  Department of Dermatology  [facility]  [facility]icine      CC:   Chief Complaint   Patient presents with    Derm Problem     Dark spots on the face - discoloration and at times it would burn        History of Present Illness:  Ms. Sailaja Aguirre is a 43 year old female who presents as a new patient.    Prev treated for melasma in 2019 w/ hydroquinone. Was told that it can be related to pregnancy      Labs:      Physical exam:  Vitals: There were no vitals taken for this visit.  GEN: well developed, well-nourished, in no acute distress, in a pleasant mood.     SKIN: Garcia phototype 3  - Focused examination of the face was performed.  - moth eaten hyperpigmenttation on halo of face   - No other lesions of concern on areas examined.     Past Medical History:   Past Medical History:   Diagnosis Date    Abnormal Pap smear of cervix 2020    see problem list     Past Surgical History:   Procedure Laterality Date     SECTION N/A 10/4/2020    Procedure:  SECTION;  Surgeon: Gris Huerta MD;  [facility]    DILATION AND CURETTAGE SUCTION N/A 9/15/2017    Procedure: DILATION AND CURETTAGE SUCTION;  Suction Dilation and Curettage ;  Surgeon: Gris Huerta MD;  Location: [facility] OR    HERNIA REPAIR      AGE 8 YEARS OLD       Social History:   reports that she has never smoked. She has never used smokeless tobacco. She reports that she does not drink alcohol and does not use drugs.    Family History:  Family History   Problem Relation Age of Onset    Family History Negative Mother         Medications:  Current Outpatient Medications   Medication Sig Dispense Refill    acetaminophen (TYLENOL) 325 MG tablet Take 2 tablets (650 mg) by mouth every 6 hours as needed for mild pain Start after Delivery. 100 tablet 0    hydroquinone (CLOTILDE) 4 % external cream Apply topically to the entire face BID for up to 12 weeks, then discontinue 56.8 g 0    ibuprofen (ADVIL/MOTRIN) 600 MG tablet Take 1 tablet (600 mg) by mouth every 6 hours as needed for moderate pain Start after delivery 60 tablet 0    norethindrone (MICRONOR) 0.35 MG tablet Take 1 tablet (0.35 mg) by mouth daily 112 tablet 3    Prenatal Vit-Fe Fumarate-FA (PRENATAL MULTIVITAMIN W/IRON) 27-0.8 MG tablet Take 1 tablet by mouth daily 90 tablet 3    senna (SENOKOT) 8.6 MG tablet Take 2 tablets by mouth 2 times daily as needed for constipation 120 tablet 0    senna-docusate (SENOKOT-S/PERICOLACE) 8.6-50 MG tablet Take 1 tablet by mouth daily Start after delivery. 100 tablet 0    simethicone (MYLICON) 80 MG chewable tablet Take 1 tablet (80 mg) by mouth 4 times daily as needed for other (gas) 30 tablet 0     No Known Allergies

## 2023-11-06 NOTE — LETTER
11/6/2023         RE: Sailaja Aguirre  [address]  [place] [address]        Dear Colleague,    Thank you for referring your patient, Sailaja Aguirre, to the [facility]. Please see a copy of my visit note below.    [facility] Dermatology Note    Encounter Date: Nov 6, 2023    Dermatology Problem List:  #Melasma; prev on hydroquinone     Major PMHx  -   ______________________________________    Impression/Plan:  Sailaja was seen today for derm problem.    Diagnoses and all orders for this visit:    Melasma  -     COMPOUNDED NON-CONTROLLED SUBSTANCE (CMPD RX) - PHARMACY TO MIX COMPOUNDED MEDICATION; Apply daily for 3 months before taking a 1 month break  -Hydroquinone 4%, fluocinolone 0.01%, tretinoin 0.05%  - Apply daily for no longer than 3 months consecutively before taking a 3-month break  - Follow-up in 3 months    Actinic skin damage  - Reviewed the compounding benefits of incremental changes to sun protective clothing behaviors including increased frequency of sunscreen and sun protective clothing like broad brimmed hats and longsleeved UPF containing clothing        Follow-up in 3 mo .       Staff Involved:  Staff Only    Ricardo Adrian MD   of Dermatology  Department of Dermatology  [facility]      CC:   Chief Complaint   Patient presents with     Derm Problem     Dark spots on the face - discoloration and at times it would burn        History of Present Illness:  Ms. Sailaja gAuirre is a 43 year old female who presents as a new patient.    Previously diagnosed with melasma was given hydroquinone, never followed up due to logistical issues with going to the place of care.  Has issues with hyperpigmentation on her forehead and cheeks.  Does notice that it gets worse with sun exposure.  Did feel like the hydroquinone helped    Labs:      Physical exam:  Vitals: There were no vitals taken for this visit.  GEN:  well developed, well-nourished, in no acute distress, in a pleasant mood.     SKIN: Garcia phototype 3  - Sun-exposed skin, which includes the head/face, neck, both arms, digits, and/or nails was examined.   - reticulated hyperpigmentation on face   - No other lesions of concern on areas examined.     Past Medical History:   Past Medical History:   Diagnosis Date     Abnormal Pap smear of cervix 2020    see problem list     Past Surgical History:   Procedure Laterality Date      SECTION N/A 10/4/2020    Procedure:  SECTION;  Surgeon: Gris Huerta MD;  [facility]     DILATION AND CURETTAGE SUCTION N/A 9/15/2017    Procedure: DILATION AND CURETTAGE SUCTION;  Suction Dilation and Curettage ;  Surgeon: Gris Huerta MD;  [facility] OR     HERNIA REPAIR      AGE 8 YEARS OLD       Social History:   reports that she has never smoked. She has never used smokeless tobacco. She reports that she does not drink alcohol and does not use drugs.    Family History:  Family History   Problem Relation Age of Onset     Family History Negative Mother        Medications:  Current Outpatient Medications   Medication Sig Dispense Refill     acetaminophen (TYLENOL) 325 MG tablet Take 2 tablets (650 mg) by mouth every 6 hours as needed for mild pain Start after Delivery. 100 tablet 0     COMPOUNDED NON-CONTROLLED SUBSTANCE (CMPD RX) - PHARMACY TO MIX COMPOUNDED MEDICATION Apply daily for 3 months before taking a 1 month break 30 g 3     hydroquinone (CLOTILDE) 4 % external cream Apply topically to the entire face BID for up to 12 weeks, then discontinue 56.8 g 0     ibuprofen (ADVIL/MOTRIN) 600 MG tablet Take 1 tablet (600 mg) by mouth every 6 hours as needed for moderate pain Start after delivery 60 tablet 0     norethindrone (MICRONOR) 0.35 MG tablet Take 1 tablet (0.35 mg) by mouth daily 112 tablet 3     Prenatal Vit-Fe Fumarate-FA (PRENATAL MULTIVITAMIN W/IRON) 27-0.8 MG tablet Take 1 tablet  by mouth daily 90 tablet 3     senna (SENOKOT) 8.6 MG tablet Take 2 tablets by mouth 2 times daily as needed for constipation 120 tablet 0     senna-docusate (SENOKOT-S/PERICOLACE) 8.6-50 MG tablet Take 1 tablet by mouth daily Start after delivery. 100 tablet 0     simethicone (MYLICON) 80 MG chewable tablet Take 1 tablet (80 mg) by mouth 4 times daily as needed for other (gas) 30 tablet 0     No Known Allergies              Again, thank you for allowing me to participate in the care of your patient.        Sincerely,        Ricardo Adrian MD

## 2023-11-06 NOTE — PATIENT INSTRUCTIONS
"Keep an eye out for a call from the pharmacy [phone], they will be calling you to set up delivery of your medication. If you don't hear from them in the next 72 hours, call the number yourself        SUNSCREENS  UVA and UVB PROTECTION    Sunlight consists of two types of light that can cause or worsen many skin problems:   - UVA (ultraviolet A): Less variation with seasons  All year round  All day strong  Passes through glass and clouds --this is important to remember while you drive   -  UVB (ultraviolet B):  Strongest in summer  Peak hours 10 am to 4pm  SPF rates UVB protection, SPF 30 blocks 97% of UVB rays (if applied correctly).     Things that can be caused or worsened by UV light, either by the sun or by tanning beds:  Skin cancers  Sunburns   Increased number of moles, brown spots, age spots  Wrinkles and other types of aging, thinning/weakening/increasing fragility of the skin, easier bruising, \"weather-beaten look\"  Rosacea  Certain autoimmune conditions     Sunscreens that block both UVA and UVB light contain these ingredients:  Zinc Oxide (should contain at least 4% zinc oxide)  Titanium Dioxide  Parsol or Avobenzone (additives improve stability of Avobenzone)  There are other UVA blocking ingredients but they are not as complete as these three.     Tips/Suggestions:   SPF of 30 or higher, but some literature suggests that even higher numbers might provide even better protection  Zinc Oxide or other UVA block such as Helioplex or Anthelios in product  Remember there is no safe UV light so there is no such thing as a safe suntan.     Apply sunscreen daily (even in winter and on cloudy days) and reapply depending on activity--every 1.5 to 2 hours if out in sun for a long time.  Apply sunscreen at least 15 to 30 minutes before sun exposure  Approximately one ounce (a shot glass) is necessary to adequately cover the entire body.  Seek shade when possible      Examples of " "sunscreens:  - Elta MD (you can order it online or buy it at [employer]. It has microsized Zinc oxide and/or titanium dioxide)  - Neutrogena sun block lotion for sensitive skin with SPF 30  - Oil of Olay complete defense daily UV moisturizer with SPF 30  -Cetaphil SPF 30 for normal skin or SPF 50 for dry skin  Sunblocks that only have \"physical\" ingredients (zinc oxide, titanium dioxide), no \"chemical\" sunscreen ingredients:  CeraVe Mineral   LA ROCHE-POSAY Mineral  Neutrogena Sheer Zinc Dry-Touch  Vanicream Broad Spectrum 50+  Goddess Garden  Sunscreens that are light-weight and often desirable for people who are very active or sweat a lot:   Neutrogena Sport  Neutrogena Hydroboost       "

## 2023-11-06 NOTE — PROGRESS NOTES
[facility] Dermatology Note    Encounter Date: Nov 6, 2023    Dermatology Problem List:  #Melasma; prev on hydroquinone     Major PMHx  -   ______________________________________    Impression/Plan:  Sailaja was seen today for derm problem.    Diagnoses and all orders for this visit:    Melasma  -     COMPOUNDED NON-CONTROLLED SUBSTANCE (CMPD RX) - PHARMACY TO MIX COMPOUNDED MEDICATION; Apply daily for 3 months before taking a 1 month break  -Hydroquinone 4%, fluocinolone 0.01%, tretinoin 0.05%  - Apply daily for no longer than 3 months consecutively before taking a 3-month break  - Follow-up in 3 months    Actinic skin damage  - Reviewed the compounding benefits of incremental changes to sun protective clothing behaviors including increased frequency of sunscreen and sun protective clothing like broad brimmed hats and longsleeved UPF containing clothing        Follow-up in 3 mo .       Staff Involved:  Staff Only    Ricardo Adrian MD   of Dermatology  Department of Dermatology  [facility] [employer] [facility]      CC:   Chief Complaint   Patient presents with    Derm Problem     Dark spots on the face - discoloration and at times it would burn        History of Present Illness:  Ms. Sailaja Aguirre is a 43 year old female who presents as a new patient.    Previously diagnosed with melasma was given hydroquinone, never followed up due to logistical issues with going to the place of care.  Has issues with hyperpigmentation on her forehead and cheeks.  Does notice that it gets worse with sun exposure.  Did feel like the hydroquinone helped    Labs:      Physical exam:  Vitals: There were no vitals taken for this visit.  GEN: well developed, well-nourished, in no acute distress, in a pleasant mood.     SKIN: Garcia phototype 3  - Sun-exposed skin, which includes the head/face, neck, both arms, digits, and/or nails was examined.   - reticulated hyperpigmentation on face    - No other lesions of concern on areas examined.     Past Medical History:   Past Medical History:   Diagnosis Date    Abnormal Pap smear of cervix 2020    see problem list     Past Surgical History:   Procedure Laterality Date     SECTION N/A 10/4/2020    Procedure:  SECTION;  Surgeon: Gris Huerta MD;  [facility]    DILATION AND CURETTAGE SUCTION N/A 9/15/2017    Procedure: DILATION AND CURETTAGE SUCTION;  Suction Dilation and Curettage ;  Surgeon: Gris Huerta MD;  [facility]    HERNIA REPAIR      AGE 8 YEARS OLD       Social History:   reports that she has never smoked. She has never used smokeless tobacco. She reports that she does not drink alcohol and does not use drugs.    Family History:  Family History   Problem Relation Age of Onset    Family History Negative Mother        Medications:  Current Outpatient Medications   Medication Sig Dispense Refill    acetaminophen (TYLENOL) 325 MG tablet Take 2 tablets (650 mg) by mouth every 6 hours as needed for mild pain Start after Delivery. 100 tablet 0    COMPOUNDED NON-CONTROLLED SUBSTANCE (CMPD RX) - PHARMACY TO MIX COMPOUNDED MEDICATION Apply daily for 3 months before taking a 1 month break 30 g 3    hydroquinone (CLOTILDE) 4 % external cream Apply topically to the entire face BID for up to 12 weeks, then discontinue 56.8 g 0    ibuprofen (ADVIL/MOTRIN) 600 MG tablet Take 1 tablet (600 mg) by mouth every 6 hours as needed for moderate pain Start after delivery 60 tablet 0    norethindrone (MICRONOR) 0.35 MG tablet Take 1 tablet (0.35 mg) by mouth daily 112 tablet 3    Prenatal Vit-Fe Fumarate-FA (PRENATAL MULTIVITAMIN W/IRON) 27-0.8 MG tablet Take 1 tablet by mouth daily 90 tablet 3    senna (SENOKOT) 8.6 MG tablet Take 2 tablets by mouth 2 times daily as needed for constipation 120 tablet 0    senna-docusate (SENOKOT-S/PERICOLACE) 8.6-50 MG tablet Take 1 tablet by mouth daily Start after delivery. 100 tablet 0     simethicone (MYLICON) 80 MG chewable tablet Take 1 tablet (80 mg) by mouth 4 times daily as needed for other (gas) 30 tablet 0     No Known Allergies

## 2023-11-20 NOTE — PATIENT INSTRUCTIONS
If you have any questions regarding your visit, Please contact your care team.    [facility]: [phone]      Women s Health CLINIC HOURS TELEPHONE NUMBER   Bailee Godwin DO.    CRIS Garcia-Surgery Scheduler  Betty - Surgery Scheduler    AIDEN Bond, AIDEN Sanz RN     Monday, Thursday  [place]  7am-3pm    Tuesday, Wednesday  [place]  7am-3pm    E/O Friday &   [place]    Typical Surgery Days: Thursday or Friday   [facility]  [address]  [place], [place] [address] [phone] Phone  [phone] Fax    [facility]  [address] [place] [address] [phone] Phone    Imaging [phone] Phone    [facility] Labor and Delivery:  [phone] Phone     **Surgeries** Our Surgery Schedulers will contact you to schedule. If you do not receive a call within 3 business days, please call [phone].    Urgent Care locations:  [facility] Saturday and    9 am - 5 pm    Monday-Friday   12 pm - 8 pm  Saturday and    9 am - 5 pm   [phone]       If you need a medication refill, please contact your pharmacy. Please allow 3 business days for your refill to be completed.  As always, Thank you for trusting us with your healthcare needs!        Healthy vulvar-vaginal hygiene practices    Avoid  Substitute    Pantyhose  Stockings with a garter belt    Thigh-high or knee-high stockings   Synthetic underwear Cotton underwear or no underwear   Jeans and other tight pants Loose pants, skirts, dresses   Swimsuits, leotards, thongs, lycra garments Loose-fitting cotton garments   Panty liners Tampons or cotton pads   Scented soaps or shampoos Fragrance-free pH neutral soap (eg, Neutrogena fragrance free, pure olive oil soap, Cetaphil gentle skin cleanser or equivalent ingredients)    Bubble bath Tub baths in the morning and at night without additives and at a comfortable temperature   Scented detergents Unscented detergents   Washcloths Use fingertips for washing; pat dry, don't rub dry   Baby wipes or flushable wipes Rinse with water using sports water bottle or perineal irrigation bottle    Feminine sprays, douches, powders These are not necessary products and can be omitted from personal practices   Dyed toilet articles Toilet articles without dyes   Hair dryers to dry vulva skin without contact Dry vulva by gentle patting

## 2023-11-21 ENCOUNTER — ANCILLARY PROCEDURE (OUTPATIENT)
Dept: GENERAL RADIOLOGY | Facility: CLINIC | Age: 43
End: 2023-11-21
Attending: OBSTETRICS & GYNECOLOGY
Payer: COMMERCIAL

## 2023-11-21 ENCOUNTER — OFFICE VISIT (OUTPATIENT)
Dept: OBGYN | Facility: CLINIC | Age: 43
End: 2023-11-21
Payer: COMMERCIAL

## 2023-11-21 VITALS
DIASTOLIC BLOOD PRESSURE: 77 MMHG | BODY MASS INDEX: 26.05 KG/M2 | WEIGHT: 133.4 LBS | SYSTOLIC BLOOD PRESSURE: 114 MMHG | HEART RATE: 84 BPM

## 2023-11-21 DIAGNOSIS — Z11.3 SCREEN FOR STD (SEXUALLY TRANSMITTED DISEASE): ICD-10-CM

## 2023-11-21 DIAGNOSIS — T83.32XD MALPOSITIONED IUD, SUBSEQUENT ENCOUNTER: ICD-10-CM

## 2023-11-21 DIAGNOSIS — Z12.4 CERVICAL CANCER SCREENING: ICD-10-CM

## 2023-11-21 DIAGNOSIS — L91.0 KELOID SCAR: ICD-10-CM

## 2023-11-21 DIAGNOSIS — L28.0 LICHEN SIMPLEX CHRONICUS: Primary | ICD-10-CM

## 2023-11-21 PROBLEM — Z36.89 ENCOUNTER FOR TRIAGE IN PREGNANT PATIENT: Status: RESOLVED | Noted: 2020-07-09 | Resolved: 2023-11-21

## 2023-11-21 PROBLEM — Z98.891 S/P CESAREAN SECTION: Status: RESOLVED | Noted: 2020-10-05 | Resolved: 2023-11-21

## 2023-11-21 PROBLEM — Z34.90 PRENATAL CARE: Status: RESOLVED | Noted: 2017-09-13 | Resolved: 2023-11-21

## 2023-11-21 PROBLEM — O44.03 PLACENTA PREVIA IN THIRD TRIMESTER: Status: RESOLVED | Noted: 2020-07-09 | Resolved: 2023-11-21

## 2023-11-21 LAB
CLUE CELLS: ABNORMAL
TRICHOMONAS, WET PREP: ABNORMAL
WBC'S/HIGH POWER FIELD, WET PREP: ABNORMAL
YEAST, WET PREP: ABNORMAL

## 2023-11-21 PROCEDURE — G0124 SCREEN C/V THIN LAYER BY MD: HCPCS | Performed by: PATHOLOGY

## 2023-11-21 PROCEDURE — 87491 CHLMYD TRACH DNA AMP PROBE: CPT | Performed by: OBSTETRICS & GYNECOLOGY

## 2023-11-21 PROCEDURE — 87591 N.GONORRHOEAE DNA AMP PROB: CPT | Performed by: OBSTETRICS & GYNECOLOGY

## 2023-11-21 PROCEDURE — 87624 HPV HI-RISK TYP POOLED RSLT: CPT | Performed by: OBSTETRICS & GYNECOLOGY

## 2023-11-21 PROCEDURE — 87210 SMEAR WET MOUNT SALINE/INK: CPT | Performed by: OBSTETRICS & GYNECOLOGY

## 2023-11-21 PROCEDURE — 74019 RADEX ABDOMEN 2 VIEWS: CPT | Mod: TC | Performed by: RADIOLOGY

## 2023-11-21 PROCEDURE — 99214 OFFICE O/P EST MOD 30 MIN: CPT | Performed by: OBSTETRICS & GYNECOLOGY

## 2023-11-21 PROCEDURE — G0145 SCR C/V CYTO,THINLAYER,RESCR: HCPCS | Performed by: OBSTETRICS & GYNECOLOGY

## 2023-11-21 RX ORDER — CLOBETASOL PROPIONATE 0.5 MG/G
OINTMENT TOPICAL 2 TIMES DAILY
Qty: 45 G | Refills: 0 | Status: SHIPPED | OUTPATIENT
Start: 2023-11-21 | End: 2023-12-05

## 2023-11-21 NOTE — PROGRESS NOTES
"  SUBJECTIVE:       HPI: Elizabeth Aguirre is a 43 year old  who presents today for possible vaginal infection.     Vulvar itching for about 4 months. Has had a \"white spot\" and a a \"cut\" on the vulva previously. Once itching starts, hard to stop. Has tried vaseline without improvement.    Patient states history of -no-infections recently.  Using tampons not pads    Vulvar and vaginal hygeine practices:  -Washes with water, mark and head and shoulders  -Does not use feminine sprays/cleaning products  -+shaves  -No douching  -Occasional JA  -Sexually active with male partner. Does not use toys, lubricant  -STI history - none      Ob Hx:  s/p   OB History    Para Term  AB Living   4 3 3 0 1 3   SAB IAB Ectopic Multiple Live Births   0 0 0 0 3      # Outcome Date GA Lbr Ronnie/2nd Weight Sex Delivery Anes PTL Lv   4 Term 10/04/20 39w4d 06:24 / 03:32 3.657 kg (8 lb 1 oz) F CS-LTranv   SHANTHI      Complications: Failure to Progress in Second Stage, Dysfunctional Labor      Name: MAINE,FEMALE-ELIZABETH      Apgar1: 8  Apgar5: 9   3 AB 09/15/17 7w3d    AB, MISSED      2 Term 13 40w0d  3.402 kg (7 lb 8 oz) F  None N SHANTHI   1 Term 06 40w0d  2.892 kg (6 lb 6 oz) F  EPI N SHANTHI    .      Gyn Hx: Patient's last menstrual period was 2023.     Last pap was 2020 ascus hpv neg  Using none for contraception.   Menarche 12 years old. Menses every 28 days. light flow.           reports that she has never smoked. She has never used smokeless tobacco.      Today's PHQ-2 Score:       2023    12:59 PM   PHQ-2 (  [employer])   Q1: Little interest or pleasure in doing things 0   Q2: Feeling down, depressed or hopeless 0   PHQ-2 Score 0   Q1: Little interest or pleasure in doing things Not at all   Q2: Feeling down, depressed or hopeless Not at all   PHQ-2 Score 0     Today's PHQ-9 Score:        No data to display              Today's PATY-7 Score:        No data to display                Problem " list and histories reviewed & adjusted, as indicated.  Additional history: as documented.    Patient Active Problem List   Diagnosis    Need for Tdap vaccination    Vitamin D deficiency    Not immune to rubella    ASCUS of cervix with negative high risk HPV     Past Surgical History:   Procedure Laterality Date     SECTION N/A 10/4/2020    Procedure:  SECTION;  Surgeon: Gris Huerta MD;  [facility]    DILATION AND CURETTAGE SUCTION N/A 9/15/2017    Procedure: DILATION AND CURETTAGE SUCTION;  Suction Dilation and Curettage ;  Surgeon: Gris Huerta MD;  Location[facility] OR    HERNIA REPAIR      AGE 8 YEARS OLD      Social History     Tobacco Use    Smoking status: Never    Smokeless tobacco: Never   Substance Use Topics    Alcohol use: No      Problem (# of Occurrences) Relation (Name,Age of Onset)    Family History Negative (1) Mother              COMPOUNDED NON-CONTROLLED SUBSTANCE (CMPD RX) - PHARMACY TO MIX COMPOUNDED MEDICATION, Apply daily for 3 months before taking a 1 month break  hydroquinone (CLOTILDE) 4 % external cream, Apply topically to the entire face BID for up to 12 weeks, then discontinue  ibuprofen (ADVIL/MOTRIN) 600 MG tablet, Take 1 tablet (600 mg) by mouth every 6 hours as needed for moderate pain Start after delivery  acetaminophen (TYLENOL) 325 MG tablet, Take 2 tablets (650 mg) by mouth every 6 hours as needed for mild pain Start after Delivery. (Patient not taking: Reported on 2023)  norethindrone (MICRONOR) 0.35 MG tablet, Take 1 tablet (0.35 mg) by mouth daily (Patient not taking: Reported on 2023)  Prenatal Vit-Fe Fumarate-FA (PRENATAL MULTIVITAMIN W/IRON) 27-0.8 MG tablet, Take 1 tablet by mouth daily (Patient not taking: Reported on 2023)  senna (SENOKOT) 8.6 MG tablet, Take 2 tablets by mouth 2 times daily as needed for constipation (Patient not taking: Reported on 2023)  senna-docusate (SENOKOT-S/PERICOLACE) 8.6-50 MG tablet, Take 1  tablet by mouth daily Start after delivery. (Patient not taking: Reported on 2023)  simethicone (MYLICON) 80 MG chewable tablet, Take 1 tablet (80 mg) by mouth 4 times daily as needed for other (gas) (Patient not taking: Reported on 2023)    No current facility-administered medications on file prior to visit.    No Known Allergies    ROS:  10 Point review of systems negative other noted above in HPI    OBJECTIVE:     /77   Pulse 84   Wt 60.5 kg (133 lb 6.4 oz)   LMP 2023   BMI 26.05 kg/m    Body mass index is 26.05 kg/m .      Gen: Alert, oriented, appropriately interactive, NAD  Resp: no audible wheeze, cough, or visible cyanosis.  No visible retractions or increased work of breathing.  Able to speak fully in complete sentences.  Abdomen: soft, non tender, non distended +keloid  scar  External genitalia: mild lichenoid plaques of upper vulva from 12-2 o'clock with mild whitening; otherwise normal appearing external genitalia, bartholins glands, urethra, skenes glands  Vagina: no masses or lesions or discharge, normally rugated.  Cervix: no masses or lesions or discharge   Bimanual exam:   Nontender pelvic floor muscles  Urethra: nontender   Bladder: nontender and without massess, well supported   Uterus: midline, anteverted, small, mobile  no masses, non-tender  Adnexa: no masses or tenderness appreciated   No cervical motion tenderness  Psych: mentation appears normal, affect normal/bright, judgement and insight intact, normal speech and appearance well-groomed      In-Clinic Test Results:  Results for orders placed or performed in visit on 23 (from the past 24 hour(s))   Wet prep - Clinic Collect    Specimen: Vagina; Swab   Result Value Ref Range    Trichomonas Absent Absent    Yeast Absent Absent    Clue Cells Absent Absent    WBCs/high power field 2+ (A) None       ASSESSMENT/PLAN:                                                      Sailaja Aguirre is a 43 year old  " who presents today for possible vaginal infection      ICD-10-CM    1. Lichen simplex chronicus  L28.0 clobetasol (TEMOVATE) 0.05 % external ointment      2. Cervical cancer screening  Z12.4 Pap Screen with HPV - recommended age 30 - 65 years      3. Screen for STD (sexually transmitted disease)  Z11.3 Neisseria gonorrhoeae PCR - Clinic Collect     Chlamydia trachomatis PCR - Clinic Collect      4. Keloid scar  L91.0 Adult Dermatology  Referral      5. Malpositioned IUD, subsequent encounter  T83.32XD XR Abdomen 2 Views          Wet prep, Gc/Ct and pap (overdue) collected. Discussed vulvar and vaginal hygiene practices, and list of practices given to patient. Advised to avoid Over the counter \"feminine\" soaps/sprays. Instead, recommend Cetaphil cleanser or other gentle ph-neutral cleanser. No need to use any products inside of the vagina.     Exam and history consistent with lichen simplex chronicus. Recommend topical clobetasol at this time and close follow-up to re-evaluate. If no improvement or persistent symptoms, recommend vulvar biopsy.    Symptomatic keloid scar- referral to dermatology sent to discuss management    History of a \"lost IUD.\" Prior ultrasound report reviewed through [facility] and no Xray performed. Discussed that while this was a long time ago and IUD likely spontaneously expelled (patient never saw device), she is able to have a confirmatory Xray if desired. Patient would like this done and order placed.      All questions answered. Patient in agreement with plan.      Bailee Godwin,   [facility]   "

## 2023-11-22 ENCOUNTER — APPOINTMENT (OUTPATIENT)
Dept: INTERPRETER SERVICES | Facility: CLINIC | Age: 43
End: 2023-11-22
Payer: COMMERCIAL

## 2023-11-22 LAB
C TRACH DNA SPEC QL NAA+PROBE: NEGATIVE
N GONORRHOEA DNA SPEC QL NAA+PROBE: NEGATIVE

## 2023-11-27 LAB
BKR LAB AP GYN ADEQUACY: ABNORMAL
BKR LAB AP GYN INTERPRETATION: ABNORMAL
BKR LAB AP HPV REFLEX: ABNORMAL
BKR LAB AP PREVIOUS ABNL DX: ABNORMAL
BKR LAB AP PREVIOUS ABNORMAL: ABNORMAL
PATH REPORT.COMMENTS IMP SPEC: ABNORMAL
PATH REPORT.COMMENTS IMP SPEC: ABNORMAL
PATH REPORT.RELEVANT HX SPEC: ABNORMAL

## 2023-11-29 ENCOUNTER — PATIENT OUTREACH (OUTPATIENT)
Dept: OBGYN | Facility: CLINIC | Age: 43
End: 2023-11-29
Payer: COMMERCIAL

## 2023-11-29 LAB
HUMAN PAPILLOMA VIRUS 16 DNA: NEGATIVE
HUMAN PAPILLOMA VIRUS 18 DNA: NEGATIVE
HUMAN PAPILLOMA VIRUS FINAL DIAGNOSIS: NORMAL
HUMAN PAPILLOMA VIRUS OTHER HR: NEGATIVE

## 2023-11-29 NOTE — TELEPHONE ENCOUNTER
11/21/23 ASCUS, neg HPV. Plan: cotest in 1 year   

## 2023-11-29 NOTE — LETTER
December 4, 2023      Sailaja Watkinsbo  [address] [place] [address]        Dear ,    This letter is regarding your recent Pap smear and Human Papillomavirus (HPV) test.    Your results showed Atypical Squamous Cells of Undetermined Significance (ASCUS) and HPV negative, meaning that no high-risk HPV was found at this time.    It is recommended that you have your next Pap smear and Human Papillomavirus (HPV) test in 1 year.    If you have additional questions regarding this result, please contact our Registered Nurse, Brenda, at [phone].      Sincerely,    Your [facility] Care Team

## 2024-02-19 ENCOUNTER — OFFICE VISIT (OUTPATIENT)
Dept: DERMATOLOGY | Facility: CLINIC | Age: 44
End: 2024-02-19
Payer: COMMERCIAL

## 2024-02-19 DIAGNOSIS — L30.4 INTERTRIGO: ICD-10-CM

## 2024-02-19 DIAGNOSIS — L20.89 OTHER ATOPIC DERMATITIS: ICD-10-CM

## 2024-02-19 DIAGNOSIS — L91.0 KELOID SCAR: ICD-10-CM

## 2024-02-19 DIAGNOSIS — L81.1 MELASMA: Primary | ICD-10-CM

## 2024-02-19 PROCEDURE — 99214 OFFICE O/P EST MOD 30 MIN: CPT | Performed by: STUDENT IN AN ORGANIZED HEALTH CARE EDUCATION/TRAINING PROGRAM

## 2024-02-19 RX ORDER — AZELAIC ACID 0.15 G/G
GEL TOPICAL
Qty: 50 G | Refills: 3 | Status: SHIPPED | OUTPATIENT
Start: 2024-02-19

## 2024-02-19 RX ORDER — TACROLIMUS 1 MG/G
OINTMENT TOPICAL 2 TIMES DAILY
Qty: 100 G | Refills: 3 | Status: SHIPPED | OUTPATIENT
Start: 2024-02-19

## 2024-02-19 ASSESSMENT — PAIN SCALES - GENERAL: PAINLEVEL: NO PAIN (0)

## 2024-02-19 NOTE — LETTER
2024         RE: Sailaja Aguirre  [address]  [place] [facility] [address]        Dear Colleague,    Thank you for referring your patient, Sailaja Aguirre, to the [facility]. Please see a copy of my visit note below.    [facility] Dermatology Note    Encounter Date: 2024    Dermatology Problem List:  _________________________________    Impression/Plan:  Sailaja was seen today for derm problem.    Diagnoses and all orders for this visit:    Melasma  -     azelaic acid (FINACIA) 15 % external gel; Apply twice daily  -Combination of 4% hydroquinone, 0.05% fluocinolone, 0.05% tretinoin helps to minimize her melasma, however when she stops it it comes back  - Discussed that this is normal  - Add azelaic acid gel twice daily as background therapy to help further reduce severity of melasma, additionally can continue on this during mandatory breaks from hydroquinone    Keloid scar  -     Adult Dermatology  Referral    Intertrigo  -     aluminum chloride (DRYSOL) 20 % external solution; Apply topically at bedtime  She had infra pannus area above her  keloid  - Drysol at nighttime  - Protopic twice daily as needed  - Consider clothing that prevent skin on skin friction in this area    Other atopic dermatitis  -     tacrolimus (PROTOPIC) 0.1 % external ointment; Apply topically 2 times daily        Follow-up PRN .       Staff Involved:  Staff Only    Ricardo Adrian MD   of Dermatology  Department of Dermatology  [facility]      CC:   Chief Complaint   Patient presents with     Derm Problem     Melasma and keloid scar from  3 yrs ago        History of Present Illness:  Ms. Sailaja Aguirre is a 43 year old female who presents as a return patient.    Last seen  for melasma at which time combination 4% hydroquinone, 0.01% fluocinolone, and 0.05% tretinoin were sent into compounding  pharmacy    All asthma has been responding well to the compounded medication.  However she is frustrated that when she stops using it it returns.  She is wearing sunscreen on a daily basis    Occasionally she has some itching above her keloid on her abdomen.  She had to decrease her exercise after giving birth due to concerns about dehiscence.  She still goes to the gym frequently, she has gained some weight but thinks that the changes in her pannus might be related to bloating    Labs:      Physical exam:  Vitals: Breastfeeding No   GEN: well developed, well-nourished, in no acute distress, in a pleasant mood.     SKIN: Garcia phototype 3  - Sun-exposed skin, which includes the head/face, neck, both arms, digits, and/or nails as well as abdomen was examined.   -Pink macerated skin and infra pannus area  - Absence of reticulated hyperpigmentation on forehead and cheeks  - No other lesions of concern on areas examined.     Past Medical History:   Past Medical History:   Diagnosis Date     Abnormal Pap smear of cervix 2020    see problem list     Past Surgical History:   Procedure Laterality Date      SECTION N/A 10/4/2020    Procedure:  SECTION;  Surgeon: Gris Huerta MD;  [facility]     DILATION AND CURETTAGE SUCTION N/A 9/15/2017    Procedure: DILATION AND CURETTAGE SUCTION;  Suction Dilation and Curettage ;  Surgeon: Gris Huerta MD;  [facility] OR     HERNIA REPAIR      AGE 8 YEARS OLD       Social History:   reports that she has never smoked. She has never used smokeless tobacco. She reports that she does not drink alcohol and does not use drugs.    Family History:  Family History   Problem Relation Age of Onset     Family History Negative Mother        Medications:  Current Outpatient Medications   Medication Sig Dispense Refill     aluminum chloride (DRYSOL) 20 % external solution Apply topically at bedtime 60 mL 3     azelaic acid (FINACIA) 15 % external gel  Apply twice daily 50 g 3     COMPOUNDED NON-CONTROLLED SUBSTANCE (CMPD RX) - PHARMACY TO MIX COMPOUNDED MEDICATION Apply daily for 3 months before taking a 1 month break 30 g 3     tacrolimus (PROTOPIC) 0.1 % external ointment Apply topically 2 times daily 100 g 3     acetaminophen (TYLENOL) 325 MG tablet Take 2 tablets (650 mg) by mouth every 6 hours as needed for mild pain Start after Delivery. (Patient not taking: Reported on 11/21/2023) 100 tablet 0     hydroquinone (CLOTILDE) 4 % external cream Apply topically to the entire face BID for up to 12 weeks, then discontinue (Patient not taking: Reported on 2/19/2024) 56.8 g 0     ibuprofen (ADVIL/MOTRIN) 600 MG tablet Take 1 tablet (600 mg) by mouth every 6 hours as needed for moderate pain Start after delivery (Patient not taking: Reported on 2/19/2024) 60 tablet 0     norethindrone (MICRONOR) 0.35 MG tablet Take 1 tablet (0.35 mg) by mouth daily (Patient not taking: Reported on 11/21/2023) 112 tablet 3     Prenatal Vit-Fe Fumarate-FA (PRENATAL MULTIVITAMIN W/IRON) 27-0.8 MG tablet Take 1 tablet by mouth daily (Patient not taking: Reported on 11/21/2023) 90 tablet 3     senna (SENOKOT) 8.6 MG tablet Take 2 tablets by mouth 2 times daily as needed for constipation (Patient not taking: Reported on 11/21/2023) 120 tablet 0     senna-docusate (SENOKOT-S/PERICOLACE) 8.6-50 MG tablet Take 1 tablet by mouth daily Start after delivery. (Patient not taking: Reported on 11/21/2023) 100 tablet 0     simethicone (MYLICON) 80 MG chewable tablet Take 1 tablet (80 mg) by mouth 4 times daily as needed for other (gas) (Patient not taking: Reported on 11/21/2023) 30 tablet 0     No Known Allergies              Again, thank you for allowing me to participate in the care of your patient.        Sincerely,        Ricardo Adrian MD

## 2024-02-19 NOTE — PROGRESS NOTES
[facility] Dermatology Note    Encounter Date: 2024    Dermatology Problem List:  _________________________________    Impression/Plan:  Sailaja was seen today for derm problem.    Diagnoses and all orders for this visit:    Melasma  -     azelaic acid (FINACIA) 15 % external gel; Apply twice daily  -Combination of 4% hydroquinone, 0.05% fluocinolone, 0.05% tretinoin helps to minimize her melasma, however when she stops it it comes back  - Discussed that this is normal  - Add azelaic acid gel twice daily as background therapy to help further reduce severity of melasma, additionally can continue on this during mandatory breaks from hydroquinone    Keloid scar  -     Adult Dermatology  Referral    Intertrigo  -     aluminum chloride (DRYSOL) 20 % external solution; Apply topically at bedtime  She had infra pannus area above her  keloid  - Drysol at nighttime  - Protopic twice daily as needed  - Consider clothing that prevent skin on skin friction in this area    Other atopic dermatitis  -     tacrolimus (PROTOPIC) 0.1 % external ointment; Apply topically 2 times daily        Follow-up PRN .       Staff Involved:  Staff Only    Ricardo Adrian MD   of Dermatology  Department of Dermatology  [facility]      CC:   Chief Complaint   Patient presents with    Derm Problem     Melasma and keloid scar from  3 yrs ago        History of Present Illness:  Ms. Sailaja Aguirre is a 43 year old female who presents as a return patient.    Last seen  for melasma at which time combination 4% hydroquinone, 0.01% fluocinolone, and 0.05% tretinoin were sent into compounding pharmacy    All asthma has been responding well to the compounded medication.  However she is frustrated that when she stops using it it returns.  She is wearing sunscreen on a daily basis    Occasionally she has some itching above her keloid on her abdomen.   She had to decrease her exercise after giving birth due to concerns about dehiscence.  She still goes to the gym frequently, she has gained some weight but thinks that the changes in her pannus might be related to bloating    Labs:      Physical exam:  Vitals: Breastfeeding No   GEN: well developed, well-nourished, in no acute distress, in a pleasant mood.     SKIN: Garcia phototype 3  - Sun-exposed skin, which includes the head/face, neck, both arms, digits, and/or nails as well as abdomen was examined.   -Pink macerated skin and infra pannus area  - Absence of reticulated hyperpigmentation on forehead and cheeks  - No other lesions of concern on areas examined.     Past Medical History:   Past Medical History:   Diagnosis Date    Abnormal Pap smear of cervix 2020    see problem list     Past Surgical History:   Procedure Laterality Date     SECTION N/A 10/4/2020    Procedure:  SECTION;  Surgeon: Gris Huerta MD;  [facility]    DILATION AND CURETTAGE SUCTION N/A 9/15/2017    Procedure: DILATION AND CURETTAGE SUCTION;  Suction Dilation and Curettage ;  Surgeon: Gris Huerta MD;  [facility] OR    HERNIA REPAIR      AGE 8 YEARS OLD       Social History:   reports that she has never smoked. She has never used smokeless tobacco. She reports that she does not drink alcohol and does not use drugs.    Family History:  Family History   Problem Relation Age of Onset    Family History Negative Mother        Medications:  Current Outpatient Medications   Medication Sig Dispense Refill    aluminum chloride (DRYSOL) 20 % external solution Apply topically at bedtime 60 mL 3    azelaic acid (FINACIA) 15 % external gel Apply twice daily 50 g 3    COMPOUNDED NON-CONTROLLED SUBSTANCE (CMPD RX) - PHARMACY TO MIX COMPOUNDED MEDICATION Apply daily for 3 months before taking a 1 month break 30 g 3    tacrolimus (PROTOPIC) 0.1 % external ointment Apply topically 2 times daily 100 g 3     acetaminophen (TYLENOL) 325 MG tablet Take 2 tablets (650 mg) by mouth every 6 hours as needed for mild pain Start after Delivery. (Patient not taking: Reported on 11/21/2023) 100 tablet 0    hydroquinone (CLOTILDE) 4 % external cream Apply topically to the entire face BID for up to 12 weeks, then discontinue (Patient not taking: Reported on 2/19/2024) 56.8 g 0    ibuprofen (ADVIL/MOTRIN) 600 MG tablet Take 1 tablet (600 mg) by mouth every 6 hours as needed for moderate pain Start after delivery (Patient not taking: Reported on 2/19/2024) 60 tablet 0    norethindrone (MICRONOR) 0.35 MG tablet Take 1 tablet (0.35 mg) by mouth daily (Patient not taking: Reported on 11/21/2023) 112 tablet 3    Prenatal Vit-Fe Fumarate-FA (PRENATAL MULTIVITAMIN W/IRON) 27-0.8 MG tablet Take 1 tablet by mouth daily (Patient not taking: Reported on 11/21/2023) 90 tablet 3    senna (SENOKOT) 8.6 MG tablet Take 2 tablets by mouth 2 times daily as needed for constipation (Patient not taking: Reported on 11/21/2023) 120 tablet 0    senna-docusate (SENOKOT-S/PERICOLACE) 8.6-50 MG tablet Take 1 tablet by mouth daily Start after delivery. (Patient not taking: Reported on 11/21/2023) 100 tablet 0    simethicone (MYLICON) 80 MG chewable tablet Take 1 tablet (80 mg) by mouth 4 times daily as needed for other (gas) (Patient not taking: Reported on 11/21/2023) 30 tablet 0     No Known Allergies             Doxycycline Pregnancy And Lactation Text: This medication is Pregnancy Category D and not consider safe during pregnancy. It is also excreted in breast milk but is considered safe for shorter treatment courses.

## 2024-02-24 ENCOUNTER — HEALTH MAINTENANCE LETTER (OUTPATIENT)
Age: 44
End: 2024-02-24

## 2024-07-13 ENCOUNTER — HEALTH MAINTENANCE LETTER (OUTPATIENT)
Age: 44
End: 2024-07-13

## 2024-11-07 ENCOUNTER — PATIENT OUTREACH (OUTPATIENT)
Dept: OBGYN | Facility: CLINIC | Age: 44
End: 2024-11-07
Payer: COMMERCIAL

## 2025-01-03 PROBLEM — R87.610 ASCUS OF CERVIX WITH NEGATIVE HIGH RISK HPV: Status: ACTIVE | Noted: 2020-04-02

## 2025-01-08 ENCOUNTER — OFFICE VISIT (OUTPATIENT)
Dept: URGENT CARE | Facility: URGENT CARE | Age: 45
End: 2025-01-08
Payer: COMMERCIAL

## 2025-01-08 VITALS
OXYGEN SATURATION: 97 % | DIASTOLIC BLOOD PRESSURE: 91 MMHG | SYSTOLIC BLOOD PRESSURE: 135 MMHG | HEART RATE: 73 BPM | BODY MASS INDEX: 27.34 KG/M2 | WEIGHT: 140 LBS | TEMPERATURE: 97.6 F

## 2025-01-08 DIAGNOSIS — B08.1 MOLLUSCUM CONTAGIOSUM: ICD-10-CM

## 2025-01-08 DIAGNOSIS — L28.0 LICHEN SIMPLEX CHRONICUS: Primary | ICD-10-CM

## 2025-01-08 PROCEDURE — 99203 OFFICE O/P NEW LOW 30 MIN: CPT | Performed by: NURSE PRACTITIONER

## 2025-01-08 RX ORDER — CLOBETASOL PROPIONATE 0.5 MG/G
OINTMENT TOPICAL 2 TIMES DAILY
Qty: 60 G | Refills: 0 | Status: SHIPPED | OUTPATIENT
Start: 2025-01-08 | End: 2025-01-18

## 2025-01-08 NOTE — PROGRESS NOTES
Assessment & Plan   Problem List Items Addressed This Visit    None  Visit Diagnoses       Lichen simplex chronicus    -  Primary    Relevant Medications    clobetasol (TEMOVATE) 0.05 % external ointment    Molluscum contagiosum        Relevant Medications    clobetasol (TEMOVATE) 0.05 % external ointment        Patient advised if symptoms persist worsen or new symptoms arise to follow-up with primary at that time.  All patient questions addressed verbalized understanding and agree with plan.             No follow-ups on file.      Ira Menard is a 44 year old, presenting for the following health issues:  Urgent Care (Vaginal symptoms- itching, irritation 4 days , concerned about a bump in vaginal - had similar sx in 11/21/23- rash outside groin, thigh, legs anf face x Oct -Nov )         No data to display              HPI               Review of Systems  Constitutional, HEENT, cardiovascular, pulmonary, GI, , musculoskeletal, neuro, skin, endocrine and psych systems are negative, except as otherwise noted.      Objective    BP (!) 135/91   Pulse 73   Temp 97.6  F (36.4  C) (Tympanic)   Wt 63.5 kg (140 lb)   SpO2 97%   BMI 27.34 kg/m    Body mass index is 27.34 kg/m .  Physical Exam   GENERAL: alert and no distress  EYES: Eyes grossly normal to inspection,  conjunctivae and sclerae normal  RESP: 6 respirations are regular nonlabored  SKIN: Upon examination patient's skin she has some areas consistent with molluscum on her hands and in her groin and legs without signs of secondary infection.   she has 1 small area of possible embolus, on the labia majora left side and lidocaine noted upper bilateral labia majora  PSYCH: mentation appears normal, affect normal/bright            Signed Electronically by: Nathalia Kohler, EMILIE

## 2025-06-19 ENCOUNTER — DOCUMENTATION ONLY (OUTPATIENT)
Dept: CONSULT | Facility: CLINIC | Age: 45
End: 2025-06-19

## 2025-06-19 DIAGNOSIS — Z84.89 FAMILY HISTORY OF GENETIC DISEASE: Primary | ICD-10-CM

## 2025-06-19 NOTE — PROGRESS NOTES
The following information was reviewed related to family member participation in daughter Geni's genetic testing. Sailaja and Paul both consented to participate in this testing.    Geni's previous genetic test results and options for current genetic testing were reviewed.    Our genes are sequences of letters that provide instructions that help our body grow, develop and function. These genes are long stretches of DNA that are packaged into chromosomes. We each have two copies of all of our chromosomes, one comes from our mother and one from our father. Geni s genetic testing looked at all of her chromosomes to see if there are any pieces of information missing (deletions) or any areas where there is too much information (duplications).      Geni's CMA testing identified SHERI but was otherwise normal. Given that Geni's previous genetic testing has failed to identify a clear cause of her challenges, whole exome sequencing is the next best diagnostic step.     Whole exome sequencing is the largest genetic test that is currently available in our clinic. This test looks for variants or changes in almost all of the genes (~20,000). To complete this test, we take samples from the child and both parents. Parent samples help the lab to narrow down all of the changes in Geni's genes and identify which seem to be most important. If the lab finds a genetic change in Geni, they are also able to tell us if she inherited that change from her parents or if this change is brand new in her. Although we ask parents to provide samples, this testing will only provide information regarding a change in their genes if it was found in Geni first.     There are three possible results for this testing:    Positive: A positive result indicates that a genetic variant has been identified that explains the cause of Geni s symptoms. A positive result may provide information on prognosis and other symptoms related to the genetic change. It  may also help guide medical management for Geni and will provide information to other family members regarding their risk.   Negative: A negative result indicates that a disease causing genetic variant was not identified  Variant of uncertain significance (VUS): A VUS is an uncertain result that indicates a genetic change was identified, but it is currently unknown if that change is associated with a genetic disorder.     Whole exome sequencing can also provide information regarding certain conditions that are unrelated to the reason we are doing the testing today. These are called secondary findings. Currently, there is a list of over 70 genes that are considered medically actionable meaning if we know there is a change in these genes there is something we can change in a person's medical management to help keep them healthy. Parents can choose whether or not receive these secondary findings for Geni as well as for themselves. Geni's parents consented to secondary findings for her but declined them for themselves today.    Risks, benefits and limitations for whole exome sequencing was reviewed. Positive results in this genetic test could provide important information related to Geni's health and may have implications for her medical management. Geni's parents expressed a good understanding of this information and decided to pursue genetic testing today.     Sonia Rush MS [facility]  Genetic Counselor  Division of Genetics and Metabolism  (p) [phone]  (f) [phone]

## 2025-07-19 ENCOUNTER — HEALTH MAINTENANCE LETTER (OUTPATIENT)
Age: 45
End: 2025-07-19

## (undated) DEVICE — GOWN XLG DISP 9545

## (undated) DEVICE — GLOVE PROTEXIS W/NEU-THERA 6.5  2D73TE65

## (undated) DEVICE — GLOVE PROTEXIS BLUE W/NEU-THERA 6.5  2D73EB65

## (undated) DEVICE — LINEN TOWEL PACK X5 5464

## (undated) DEVICE — SUCTION CANISTER MEDIVAC LINER 1500ML W/LID 65651-515

## (undated) DEVICE — SU VICRYL 0 CT-1 36" J346H

## (undated) DEVICE — GLOVE ESTEEM POWDER FREE SMT 6.5  2D72PT65

## (undated) DEVICE — CATH TRAY FOLEY 16FR BARDEX W/DRAIN BAG STATLOCK 300316A

## (undated) DEVICE — ESU GROUND PAD UNIVERSAL W/O CORD

## (undated) DEVICE — BASIN SET MAJOR

## (undated) DEVICE — PREP CHLORAPREP 26ML TINTED ORANGE  260815

## (undated) DEVICE — DRSG STERI STRIP 1/4X3" R1541

## (undated) DEVICE — SPONGE RAY-TEC 4X8" 7318

## (undated) DEVICE — BARRIER INTERCEED 3X4" 4350

## (undated) DEVICE — SOL WATER IRRIG 1000ML BOTTLE 07139-09

## (undated) DEVICE — PAD CHUX UNDERPAD 30X30"

## (undated) DEVICE — SOL NACL 0.9% IRRIG 1000ML BOTTLE 2F7124

## (undated) DEVICE — DECANTER TRANSFER DEVICE 2008S

## (undated) DEVICE — SUCTION CANNULA UTERINE 08MM CVD  20317

## (undated) DEVICE — STOCKING SLEEVE COMPRESSION CALF LG

## (undated) DEVICE — LINEN GOWN X4 5410

## (undated) DEVICE — TUBING VACUUM COLLECTION 6FT 23116

## (undated) DEVICE — STRAP KNEE/BODY 31143004

## (undated) DEVICE — Device

## (undated) DEVICE — SU VICRYL 4-0 PS-2 18" UND J496G

## (undated) DEVICE — PACK C-SECTION LF PL15OTA83B

## (undated) DEVICE — SOL NACL 0.9% IRRIG 1000ML BOTTLE 07138-09

## (undated) DEVICE — SOL ADH LIQUID BENZOIN SWAB 0.6ML C1544

## (undated) DEVICE — SU MONOCRYL 0 CT-1 36" Y346H

## (undated) DEVICE — SUCTION VACUUM CANISTER STANDARD W/LID&CAPS 003987-901

## (undated) DEVICE — SPECIMEN TRAP VACUUM SUCTION SAFETOUCH 003853-902

## (undated) RX ORDER — MORPHINE SULFATE 1 MG/ML
INJECTION, SOLUTION EPIDURAL; INTRATHECAL; INTRAVENOUS
Status: DISPENSED
Start: 2020-10-05

## (undated) RX ORDER — LIDOCAINE HYDROCHLORIDE 20 MG/ML
INJECTION, SOLUTION EPIDURAL; INFILTRATION; INTRACAUDAL; PERINEURAL
Status: DISPENSED
Start: 2017-09-15

## (undated) RX ORDER — FENTANYL CITRATE 50 UG/ML
INJECTION, SOLUTION INTRAMUSCULAR; INTRAVENOUS
Status: DISPENSED
Start: 2020-10-04

## (undated) RX ORDER — ACETAMINOPHEN 325 MG/1
TABLET ORAL
Status: DISPENSED
Start: 2017-09-15

## (undated) RX ORDER — KETOROLAC TROMETHAMINE 30 MG/ML
INJECTION, SOLUTION INTRAMUSCULAR; INTRAVENOUS
Status: DISPENSED
Start: 2017-09-15

## (undated) RX ORDER — PROPOFOL 10 MG/ML
INJECTION, EMULSION INTRAVENOUS
Status: DISPENSED
Start: 2017-09-15

## (undated) RX ORDER — FENTANYL CITRATE 50 UG/ML
INJECTION, SOLUTION INTRAMUSCULAR; INTRAVENOUS
Status: DISPENSED
Start: 2017-09-15

## (undated) RX ORDER — OXYTOCIN/0.9 % SODIUM CHLORIDE 30/500 ML
PLASTIC BAG, INJECTION (ML) INTRAVENOUS
Status: DISPENSED
Start: 2020-10-05

## (undated) RX ORDER — ONDANSETRON 2 MG/ML
INJECTION INTRAMUSCULAR; INTRAVENOUS
Status: DISPENSED
Start: 2017-09-15

## (undated) RX ORDER — DOXYCYCLINE 100 MG/1
CAPSULE ORAL
Status: DISPENSED
Start: 2017-09-15

## (undated) RX ORDER — ONDANSETRON 2 MG/ML
INJECTION INTRAMUSCULAR; INTRAVENOUS
Status: DISPENSED
Start: 2020-10-04

## (undated) RX ORDER — PHENAZOPYRIDINE HYDROCHLORIDE 200 MG/1
TABLET, FILM COATED ORAL
Status: DISPENSED
Start: 2017-09-15

## (undated) RX ORDER — OXYCODONE HYDROCHLORIDE 5 MG/1
TABLET ORAL
Status: DISPENSED
Start: 2017-09-15

## (undated) RX ORDER — LIDOCAINE HYDROCHLORIDE 10 MG/ML
INJECTION, SOLUTION EPIDURAL; INFILTRATION; INTRACAUDAL; PERINEURAL
Status: DISPENSED
Start: 2017-09-15